# Patient Record
Sex: FEMALE | Race: WHITE | NOT HISPANIC OR LATINO | Employment: UNEMPLOYED | URBAN - METROPOLITAN AREA
[De-identification: names, ages, dates, MRNs, and addresses within clinical notes are randomized per-mention and may not be internally consistent; named-entity substitution may affect disease eponyms.]

---

## 2017-03-15 ENCOUNTER — HISTORICAL (OUTPATIENT)
Dept: LAB | Facility: HOSPITAL | Age: 57
End: 2017-03-15

## 2017-06-19 ENCOUNTER — HISTORICAL (OUTPATIENT)
Dept: LAB | Facility: HOSPITAL | Age: 57
End: 2017-06-19

## 2017-06-19 LAB
ABS NEUT (OLG): 3.28 X10(3)/MCL (ref 2.1–9.2)
ALBUMIN SERPL-MCNC: 4.6 GM/DL (ref 3.4–5)
ALBUMIN/GLOB SERPL: 1.6 {RATIO}
ALP SERPL-CCNC: 170 UNIT/L (ref 38–126)
ALT SERPL-CCNC: 82 UNIT/L (ref 12–78)
AST SERPL-CCNC: 24 UNIT/L (ref 15–37)
BASOPHILS # BLD AUTO: 0 X10(3)/MCL (ref 0–0.2)
BASOPHILS NFR BLD AUTO: 0 %
BILIRUB SERPL-MCNC: 0.4 MG/DL (ref 0.2–1)
BILIRUBIN DIRECT+TOT PNL SERPL-MCNC: 0.1 MG/DL (ref 0–0.2)
BILIRUBIN DIRECT+TOT PNL SERPL-MCNC: 0.3 MG/DL (ref 0–0.8)
BUN SERPL-MCNC: 17 MG/DL (ref 7–18)
CALCIUM SERPL-MCNC: 9.3 MG/DL (ref 8.5–10.1)
CHLORIDE SERPL-SCNC: 104 MMOL/L (ref 98–107)
CHOLEST SERPL-MCNC: 293 MG/DL (ref 0–200)
CHOLEST/HDLC SERPL: 6.2 {RATIO} (ref 0–4)
CO2 SERPL-SCNC: 29 MMOL/L (ref 21–32)
CREAT SERPL-MCNC: 1.08 MG/DL (ref 0.55–1.02)
EOSINOPHIL # BLD AUTO: 0.1 X10(3)/MCL (ref 0–0.9)
EOSINOPHIL NFR BLD AUTO: 2 %
ERYTHROCYTE [DISTWIDTH] IN BLOOD BY AUTOMATED COUNT: 12.1 % (ref 11.5–17)
EST. AVERAGE GLUCOSE BLD GHB EST-MCNC: 100 MG/DL
GLOBULIN SER-MCNC: 2.8 GM/DL (ref 2.4–3.5)
GLUCOSE SERPL-MCNC: 91 MG/DL (ref 74–106)
HBA1C MFR BLD: 5.1 % (ref 4.2–6.3)
HCT VFR BLD AUTO: 42 % (ref 37–47)
HDLC SERPL-MCNC: 47 MG/DL (ref 35–60)
HGB BLD-MCNC: 14.5 GM/DL (ref 12–16)
LDLC SERPL CALC-MCNC: ABNORMAL MG/DL (ref 0–129)
LYMPHOCYTES # BLD AUTO: 2.3 X10(3)/MCL (ref 0.6–4.6)
LYMPHOCYTES NFR BLD AUTO: 37 %
MCH RBC QN AUTO: 33.7 PG (ref 27–31)
MCHC RBC AUTO-ENTMCNC: 34.5 GM/DL (ref 33–36)
MCV RBC AUTO: 97.7 FL (ref 80–94)
MONOCYTES # BLD AUTO: 0.4 X10(3)/MCL (ref 0.1–1.3)
MONOCYTES NFR BLD AUTO: 7 %
NEUTROPHILS # BLD AUTO: 3.28 X10(3)/MCL (ref 2.1–9.2)
NEUTROPHILS NFR BLD AUTO: 54 %
PLATELET # BLD AUTO: 227 X10(3)/MCL (ref 130–400)
PMV BLD AUTO: 10.5 FL (ref 9.4–12.4)
POTASSIUM SERPL-SCNC: 4.2 MMOL/L (ref 3.5–5.1)
PROT SERPL-MCNC: 7.4 GM/DL (ref 6.4–8.2)
RBC # BLD AUTO: 4.3 X10(6)/MCL (ref 4.2–5.4)
SODIUM SERPL-SCNC: 139 MMOL/L (ref 136–145)
TRIGL SERPL-MCNC: 419 MG/DL (ref 30–150)
VLDLC SERPL CALC-MCNC: 84 MG/DL
WBC # SPEC AUTO: 6.1 X10(3)/MCL (ref 4.5–11.5)

## 2017-07-20 ENCOUNTER — HISTORICAL (OUTPATIENT)
Dept: LAB | Facility: HOSPITAL | Age: 57
End: 2017-07-20

## 2017-07-23 LAB — FINAL CULTURE: NORMAL

## 2019-02-14 ENCOUNTER — HISTORICAL (OUTPATIENT)
Dept: LAB | Facility: HOSPITAL | Age: 59
End: 2019-02-14

## 2019-02-14 LAB
ABS NEUT (OLG): 2.95 X10(3)/MCL (ref 2.1–9.2)
ALBUMIN SERPL-MCNC: 4.2 GM/DL (ref 3.4–5)
ALBUMIN/GLOB SERPL: 1.4 {RATIO}
ALP SERPL-CCNC: 97 UNIT/L (ref 45–117)
ALT SERPL-CCNC: 27 UNIT/L (ref 13–56)
APPEARANCE, UA: CLEAR
AST SERPL-CCNC: 20 UNIT/L (ref 15–37)
BASOPHILS # BLD AUTO: 0.02 X10(3)/MCL (ref 0–0.2)
BASOPHILS NFR BLD AUTO: 0.4 % (ref 0–1)
BILIRUB SERPL-MCNC: 0.4 MG/DL (ref 0.2–1)
BILIRUB UR QL STRIP: NEGATIVE
BILIRUBIN DIRECT+TOT PNL SERPL-MCNC: <0.1 MG/DL (ref 0–0.2)
BILIRUBIN DIRECT+TOT PNL SERPL-MCNC: >0.3 MG/DL (ref 0–1)
BUN SERPL-MCNC: 16 MG/DL (ref 7–18)
CALCIUM SERPL-MCNC: 8.9 MG/DL (ref 8.5–10.1)
CHLORIDE SERPL-SCNC: 107 MMOL/L (ref 98–107)
CHOLEST SERPL-MCNC: 256 MG/DL (ref 0–199)
CHOLEST/HDLC SERPL: 6 MG/DL (ref 0–8)
CO2 SERPL-SCNC: 28 MMOL/L (ref 21–32)
COLOR UR: NORMAL
CREAT SERPL-MCNC: 0.99 MG/DL (ref 0.55–1.02)
EOSINOPHIL # BLD AUTO: 0.05 X10(3)/MCL (ref 0–0.9)
EOSINOPHIL NFR BLD AUTO: 0.9 % (ref 0–6.4)
ERYTHROCYTE [DISTWIDTH] IN BLOOD BY AUTOMATED COUNT: 11.5 % (ref 11.5–17)
GLOBULIN SER-MCNC: 3 GM/DL (ref 2–4)
GLUCOSE (UA): NEGATIVE
GLUCOSE SERPL-MCNC: 81 MG/DL (ref 74–106)
HCT VFR BLD AUTO: 39.5 % (ref 37–47)
HDLC SERPL-MCNC: 43 MG/DL
HGB BLD-MCNC: 14.1 GM/DL (ref 12–16)
HGB UR QL STRIP: NEGATIVE
IMM GRANULOCYTES # BLD AUTO: 0.01 10*3/UL (ref 0–0.02)
IMM GRANULOCYTES NFR BLD AUTO: 0.2 % (ref 0–0.43)
KETONES UR QL STRIP: NEGATIVE
LDLC SERPL CALC-MCNC: 112 MG/DL (ref 0–129)
LEUKOCYTE ESTERASE UR QL STRIP: NEGATIVE
LYMPHOCYTES # BLD AUTO: 1.95 X10(3)/MCL (ref 0.6–4.6)
LYMPHOCYTES NFR BLD AUTO: 36 % (ref 16–44)
MCH RBC QN AUTO: 34.7 PG (ref 27–31)
MCHC RBC AUTO-ENTMCNC: 35.7 GM/DL (ref 33–36)
MCV RBC AUTO: 97.3 FL (ref 80–94)
MONOCYTES # BLD AUTO: 0.44 X10(3)/MCL (ref 0.1–1.3)
MONOCYTES NFR BLD AUTO: 8.1 % (ref 4–12.1)
NEUTROPHILS # BLD AUTO: 2.95 X10(3)/MCL (ref 2.1–9.2)
NEUTROPHILS NFR BLD AUTO: 54.4 % (ref 43–73)
NITRITE UR QL STRIP: NEGATIVE
NRBC BLD AUTO-RTO: 0 % (ref 0–0.2)
PH UR STRIP: 6 [PH] (ref 5–7)
PLATELET # BLD AUTO: 201 X10(3)/MCL (ref 130–400)
PMV BLD AUTO: 9.9 FL (ref 7.4–10.4)
POTASSIUM SERPL-SCNC: 4.3 MMOL/L (ref 3.5–5.1)
PROT SERPL-MCNC: 7.6 GM/DL (ref 6.4–8.2)
PROT UR QL STRIP: NEGATIVE
RBC # BLD AUTO: 4.06 X10(6)/MCL (ref 4.2–5.4)
SODIUM SERPL-SCNC: 141 MMOL/L (ref 136–145)
SP GR UR STRIP: 1.01 (ref 1–1.03)
TRIGL SERPL-MCNC: 503 MG/DL (ref 0–149)
TSH SERPL-ACNC: 1.35 MIU/ML (ref 0.36–3.74)
UROBILINOGEN UR STRIP-ACNC: NEGATIVE
VLDLC SERPL CALC-MCNC: 101 MG/DL
WBC # SPEC AUTO: 5.4 X10(3)/MCL (ref 4.5–11.5)

## 2019-06-17 ENCOUNTER — HISTORICAL (OUTPATIENT)
Dept: URGENT CARE | Facility: CLINIC | Age: 59
End: 2019-06-17

## 2021-12-08 ENCOUNTER — HISTORICAL (OUTPATIENT)
Dept: ADMINISTRATIVE | Facility: HOSPITAL | Age: 61
End: 2021-12-08

## 2021-12-08 LAB
ABS NEUT (OLG): 2.23 X10(3)/MCL (ref 2.1–9.2)
ALBUMIN SERPL-MCNC: 4.4 GM/DL (ref 3.4–4.8)
ALBUMIN/GLOB SERPL: 1.2 RATIO (ref 1.1–2)
ALP SERPL-CCNC: 153 UNIT/L (ref 40–150)
ALT SERPL-CCNC: 46 UNIT/L (ref 0–55)
APPEARANCE, UA: CLEAR
AST SERPL-CCNC: 22 UNIT/L (ref 5–34)
BACTERIA #/AREA URNS AUTO: ABNORMAL /HPF
BASOPHILS # BLD AUTO: 0 X10(3)/MCL (ref 0–0.2)
BASOPHILS NFR BLD AUTO: 0 %
BILIRUB SERPL-MCNC: 0.6 MG/DL
BILIRUB UR QL STRIP: NEGATIVE
BILIRUBIN DIRECT+TOT PNL SERPL-MCNC: 0.2 MG/DL (ref 0–0.5)
BILIRUBIN DIRECT+TOT PNL SERPL-MCNC: 0.4 MG/DL (ref 0–0.8)
BUN SERPL-MCNC: 20.6 MG/DL (ref 9.8–20.1)
CALCIUM SERPL-MCNC: 10.7 MG/DL (ref 8.7–10.5)
CHLORIDE SERPL-SCNC: 102 MMOL/L (ref 98–107)
CHOLEST SERPL-MCNC: 320 MG/DL
CHOLEST/HDLC SERPL: 6 {RATIO} (ref 0–5)
CO2 SERPL-SCNC: 29 MMOL/L (ref 23–31)
COLOR UR: YELLOW
CREAT SERPL-MCNC: 1.04 MG/DL (ref 0.55–1.02)
EOSINOPHIL # BLD AUTO: 0.1 X10(3)/MCL (ref 0–0.9)
EOSINOPHIL NFR BLD AUTO: 3 %
ERYTHROCYTE [DISTWIDTH] IN BLOOD BY AUTOMATED COUNT: 12.7 % (ref 11.5–14.5)
GLOBULIN SER-MCNC: 3.6 GM/DL (ref 2.4–3.5)
GLUCOSE (UA): NEGATIVE
GLUCOSE SERPL-MCNC: 80 MG/DL (ref 82–115)
HCT VFR BLD AUTO: 38.4 % (ref 35–46)
HDLC SERPL-MCNC: 55 MG/DL (ref 35–60)
HGB BLD-MCNC: 13.6 GM/DL (ref 12–16)
HGB UR QL STRIP: NEGATIVE
HYALINE CASTS #/AREA URNS LPF: ABNORMAL /LPF
IMM GRANULOCYTES # BLD AUTO: 0.01 10*3/UL
IMM GRANULOCYTES NFR BLD AUTO: 0 %
KETONES UR QL STRIP: NEGATIVE
LDLC SERPL CALC-MCNC: 224 MG/DL (ref 50–140)
LEUKOCYTE ESTERASE UR QL STRIP: 75 LEU/UL
LYMPHOCYTES # BLD AUTO: 1.9 X10(3)/MCL (ref 0.6–4.6)
LYMPHOCYTES NFR BLD AUTO: 40 %
MCH RBC QN AUTO: 35.1 PG (ref 26–34)
MCHC RBC AUTO-ENTMCNC: 35.4 GM/DL (ref 31–37)
MCV RBC AUTO: 99 FL (ref 80–100)
MONOCYTES # BLD AUTO: 0.4 X10(3)/MCL (ref 0.1–1.3)
MONOCYTES NFR BLD AUTO: 8 %
NEUTROPHILS # BLD AUTO: 2.23 X10(3)/MCL (ref 2.1–9.2)
NEUTROPHILS NFR BLD AUTO: 48 %
NITRITE UR QL STRIP: NEGATIVE
NRBC BLD AUTO-RTO: 0 % (ref 0–0.2)
PH UR STRIP: 7 [PH] (ref 4.5–8)
PLATELET # BLD AUTO: 234 X10(3)/MCL (ref 130–400)
PMV BLD AUTO: 9.2 FL (ref 7.4–10.4)
POTASSIUM SERPL-SCNC: 4.2 MMOL/L (ref 3.5–5.1)
PROT SERPL-MCNC: 8 GM/DL (ref 5.8–7.6)
PROT UR QL STRIP: NEGATIVE
RBC # BLD AUTO: 3.88 X10(6)/MCL (ref 4–5.2)
RBC #/AREA URNS AUTO: ABNORMAL /HPF
SODIUM SERPL-SCNC: 141 MMOL/L (ref 136–145)
SP GR UR STRIP: 1.01 (ref 1–1.03)
SQUAMOUS #/AREA URNS LPF: ABNORMAL /LPF
TRIGL SERPL-MCNC: 204 MG/DL (ref 37–140)
UROBILINOGEN UR STRIP-ACNC: NORMAL
VLDLC SERPL CALC-MCNC: 41 MG/DL
WBC # SPEC AUTO: 4.6 X10(3)/MCL (ref 4.5–11)
WBC #/AREA URNS AUTO: ABNORMAL /HPF

## 2021-12-09 LAB — FINAL CULTURE: NORMAL

## 2022-02-17 ENCOUNTER — HISTORICAL (OUTPATIENT)
Dept: ADMINISTRATIVE | Facility: HOSPITAL | Age: 62
End: 2022-02-17

## 2022-03-02 ENCOUNTER — HISTORICAL (OUTPATIENT)
Dept: ADMINISTRATIVE | Facility: HOSPITAL | Age: 62
End: 2022-03-02

## 2022-03-02 LAB
ABS NEUT (OLG): 7.42 (ref 2.1–9.2)
ALBUMIN SERPL-MCNC: 3.2 G/DL (ref 3.4–4.8)
ALBUMIN/GLOB SERPL: 1.1 {RATIO} (ref 1.1–2)
ALP SERPL-CCNC: 139 U/L (ref 40–150)
ALT SERPL-CCNC: 164 U/L (ref 0–55)
APPEARANCE, UA: CLEAR
AST SERPL-CCNC: 21 U/L (ref 5–34)
BACTERIA SPEC CULT: NORMAL
BASOPHILS # BLD AUTO: 0 10*3/UL (ref 0–0.2)
BASOPHILS NFR BLD AUTO: 0 %
BILIRUB SERPL-MCNC: 0.6 MG/DL
BILIRUB UR QL STRIP: NEGATIVE
BILIRUBIN DIRECT+TOT PNL SERPL-MCNC: 0.2 (ref 0–0.5)
BILIRUBIN DIRECT+TOT PNL SERPL-MCNC: 0.4 (ref 0–0.8)
BUN SERPL-MCNC: 32 MG/DL (ref 9.8–20.1)
CALCIUM SERPL-MCNC: 9.5 MG/DL (ref 8.7–10.5)
CHLORIDE SERPL-SCNC: 99 MMOL/L (ref 98–107)
CHOLEST SERPL-MCNC: 217 MG/DL
CHOLEST/HDLC SERPL: 4 {RATIO} (ref 0–5)
CO2 SERPL-SCNC: 29 MMOL/L (ref 23–31)
COLOR UR: NORMAL
CREAT SERPL-MCNC: 0.8 MG/DL (ref 0.55–1.02)
EOSINOPHIL # BLD AUTO: 0.1 10*3/UL (ref 0–0.9)
EOSINOPHIL NFR BLD AUTO: 0 %
ERYTHROCYTE [DISTWIDTH] IN BLOOD BY AUTOMATED COUNT: 15.4 % (ref 11.5–14.5)
FLAG2 (OHS): 80
FLAG3 (OHS): 80
FLAGS (OHS): 70
GLOBULIN SER-MCNC: 2.9 G/DL (ref 2.4–3.5)
GLUCOSE (UA): NORMAL
GLUCOSE SERPL-MCNC: 84 MG/DL (ref 82–115)
HCT VFR BLD AUTO: 33.6 % (ref 35–46)
HDLC SERPL-MCNC: 62 MG/DL (ref 35–60)
HEMOLYSIS INTERF INDEX SERPL-ACNC: 7
HGB BLD-MCNC: 11.1 G/DL (ref 12–16)
HGB UR QL STRIP: NEGATIVE
HYALINE CASTS #/AREA URNS LPF: NORMAL /[LPF]
ICTERIC INTERF INDEX SERPL-ACNC: 1
IMM GRANULOCYTES # BLD AUTO: 0.2 10*3/UL
IMM GRANULOCYTES NFR BLD AUTO: 2 %
IMM. NE 1 SUSPECT FLAG (OHS): 20
IMM. NE 2 SUSPECT FLAG (OHS): 10
KETONES UR QL STRIP: NEGATIVE
LDLC SERPL CALC-MCNC: 120 MG/DL (ref 50–140)
LEUKOCYTE ESTERASE UR QL STRIP: NEGATIVE
LIPEMIC INTERF INDEX SERPL-ACNC: 5
LOW EVENT # SUSPECT FLAG (OHS): 70
LYMPHOCYTES # BLD AUTO: 2.5 10*3/UL (ref 0.6–4.6)
LYMPHOCYTES NFR BLD AUTO: 22 %
MANUAL DIFF? (OHS): NO
MCH RBC QN AUTO: 32.5 PG (ref 26–34)
MCHC RBC AUTO-ENTMCNC: 33 G/DL (ref 31–37)
MCV RBC AUTO: 98.2 FL (ref 80–100)
MO BLASTS SUSPECT FLAG (OHS): 40
MONOCYTES # BLD AUTO: 0.9 10*3/UL (ref 0.1–1.3)
MONOCYTES NFR BLD AUTO: 8 %
MUCOUS THREADS URNS QL MICRO: NORMAL
NEUTROPHILS # BLD AUTO: 7.42 10*3/UL (ref 2.1–9.2)
NEUTROPHILS NFR BLD AUTO: 67 %
NITRITE UR QL STRIP: NEGATIVE
NRBC BLD AUTO-RTO: 0 % (ref 0–0.2)
PH UR STRIP: 7 [PH] (ref 4.5–8)
PLATELET # BLD AUTO: 217 10*3/UL (ref 130–400)
PLATELET CLUMPS SUSPECT FLAG (OHS): 30
PMV BLD AUTO: 10.2 FL (ref 7.4–10.4)
POTASSIUM SERPL-SCNC: 4.3 MMOL/L (ref 3.5–5.1)
PROT SERPL-MCNC: 6.1 G/DL (ref 5.8–7.6)
PROT UR QL STRIP: NEGATIVE
RBC # BLD AUTO: 3.42 10*6/UL (ref 4–5.2)
RBC #/AREA URNS HPF: NORMAL /[HPF] (ref 0–5)
SODIUM SERPL-SCNC: 134 MMOL/L (ref 136–145)
SP GR UR STRIP: 1.02 (ref 1–1.03)
SQUAMOUS EPITHELIAL, UA: NORMAL
TRIGL SERPL-MCNC: 177 MG/DL (ref 37–140)
UROBILINOGEN UR STRIP-ACNC: NORMAL
VLDLC SERPL CALC-MCNC: 35 MG/DL
WBC # SPEC AUTO: 11 10*3/UL (ref 4.5–11)
WBC #/AREA URNS HPF: NORMAL /[HPF] (ref 0–5)

## 2022-03-28 ENCOUNTER — HISTORICAL (OUTPATIENT)
Dept: ADMINISTRATIVE | Facility: HOSPITAL | Age: 62
End: 2022-03-28

## 2022-05-03 NOTE — HISTORICAL [facility]
This is a historical note converted from Paula. Formatting and pictures may have been removed.  Please reference Paula for original formatting and attached multimedia. Chief Complaint  Needs pcp  History of Present Illness  61-year-old white female presents establish care in the [facility]. ?Patient?has a past medical history of?PTSD, generalized anxiety,?hypertension, mixed hyperlipidemia,?AML, neuropathy. ?She was previously seeing Dr. Karely Lackey (family medicine)?but has not been back in over 2 years. ?She?was also well-established with a cardiologist?but missed her last few appointments and tells me that they have been trying to contact her recently?for follow-up visit; will call them back soon.  Denies any complaints today, needs refills on her medications.  Review of Systems  ????Constitutional: No fever, No chills, No weakness, No fatigue.  ?????Eye: No recent visual problem.  ?????Respiratory: No shortness of breath, No cough, No sputum production, No wheezing.  ?????Cardiovascular: No chest pain, No palpitations, No peripheral edema.  ?????Gastrointestinal: No nausea, No vomiting, No diarrhea, No constipation, No heartburn, No abdominal pain.  ?????Genitourinary: No dysuria.  ?????Endocrine: No excessive thirst, No polyuria, No cold intolerance, No heat intolerance.  ?????Musculoskeletal: No back pain, No joint pain, No decreased range of motion.  ?????Integumentary: No rash, No pruritus.  ?????Neurologic: Alert and oriented X4, No numbness, No tingling, No headache.  ?????Psychiatric: No anxiety, No depression.  Physical Exam  Vitals & Measurements  T:?37.0? ?C (Oral)? HR:?50(Peripheral)? RR:?18? BP:?144/68?  HT:?160.00?cm? WT:?64.400?kg? BMI:?25.16?  General: Alert and oriented, No acute distress.  ?????Eye: Pupils are equal, round and reactive to light, Extraocular movements are intact, Normal conjunctiva.  ?????HENT: Normocephalic, Oral mucosa is moist, No pharyngeal  erythema.  ?????Neck: Supple, Non-tender.  ?????Respiratory: Lungs are clear to auscultation, Respirations are non-labored.  ?????Cardiovascular: Normal rate, Regular rhythm, No murmur, Good pulses equal in all extremities, No edema.  ?????Gastrointestinal: Soft, Non-tender, Non-distended, Normal bowel sounds.  ?????Musculoskeletal: Normal range of motion, Normal strength.  ?????Integumentary: Warm, Dry.  ?????Neurologic: Alert, Oriented.  ?????Cognition and Speech: Oriented, Speech clear and coherent.  ?????Psychiatric: Cooperative, Appropriate mood & affect.  Assessment/Plan  Cervical cancer screening?Z12.4  ?  History of acute myeloid leukemia?Z85.6  Ordered:  CBC w/ Auto Diff, Routine collect, *Est. 12/08/21 3:00:00 CST, Blood, Order for future visit, *Est. Stop date 12/08/21 3:00:00 CST, Lab Collect, Wellness examination  Screening cholesterol level  Hypertension  History of acute myeloid leukemia  Neuropathy  PTSD (p...  CBC w/ Auto Diff, Routine collect, *Est. 03/08/22 3:00:00 CST, Blood, Order for future visit, *Est. Stop date 03/08/22 3:00:00 CST, Lab Collect, Wellness examination  Screening cholesterol level  Hypertension  History of acute myeloid leukemia  Neuropathy, 12/08/21...  Clinic Follow up, *Est. 03/15/22 3:00:00 CDT, Order for future visit, Wellness examination  Screening cholesterol level  Hypertension  History of acute myeloid leukemia  Neuropathy  Clinic Follow up, *Est. 03/08/22 3:00:00 CST, FASTING labs, Order for future visit, Wellness examination  Screening cholesterol level  Hypertension  History of acute myeloid leukemia  Neuropathy  Comprehensive Metabolic Panel, Routine collect, *Est. 03/08/22 3:00:00 CST, Blood, Order for future visit, *Est. Stop date 03/08/22 3:00:00 CST, Lab Collect, Wellness examination  Screening cholesterol level  Hypertension  History of acute myeloid leukemia  Neuropathy, 12/08/21...  Comprehensive Metabolic Panel, Routine collect, *Est.  12/08/21 3:00:00 CST, Blood, Order for future visit, *Est. Stop date 12/08/21 3:00:00 CST, Lab Collect, Wellness examination  Screening cholesterol level  Hypertension  History of acute myeloid leukemia  Neuropathy  PTSD (p...  Lipid Panel, Routine collect, *Est. 03/08/22 3:00:00 CST, Blood, Order for future visit, *Est. Stop date 03/08/22 3:00:00 CST, Lab Collect, Wellness examination  Screening cholesterol level  Hypertension  History of acute myeloid leukemia  Neuropathy, 12/08/21...  Lipid Panel, Routine collect, *Est. 12/08/21 3:00:00 CST, Blood, Order for future visit, *Est. Stop date 12/08/21 3:00:00 CST, Lab Collect, Wellness examination  Screening cholesterol level  Hypertension  History of acute myeloid leukemia  Neuropathy  PTSD (p...  Urinalysis with Microscopic if Indicated, Routine collect, Urine, Order for future visit, *Est. 03/08/22 3:00:00 CST, *Est. Stop date 03/08/22 3:00:00 CST, Nurse collect, Wellness examination  Screening cholesterol level  Hypertension  History of acute myeloid leukemia  Neuropathy  Urinalysis with Microscopic if Indicated, Routine collect, Urine, Order for future visit, *Est. 12/08/21 3:00:00 CST, *Est. Stop date 12/08/21 3:00:00 CST, Nurse collect, Wellness examination  Screening cholesterol level  Hypertension  History of acute myeloid leukemia  Neuropathy  PTSD...  ?  Screen for colon cancer?Z12.11  ?  Orders:  gabapentin, 800 mg = 1 tab(s), Oral, TID, # 90 tab(s), 3 Refill(s), Pharmacy: [facility] [id number], 160, cm, Height/Length Dosing, 12/08/21 9:17:00 CST, 64.4, kg, Weight Dosing, 12/08/21 9:17:00 CST  hydrOXYzine, 25 mg = 1 cap(s), Oral, TID, X 30 day(s), # 90 cap(s), 3 Refill(s), Pharmacy: [facility] [id number], 160, cm, Height/Length Dosing, 12/08/21 9:17:00 CST, 64.4, kg, Weight Dosing, 12/08/21 9:17:00 CST  lisinopril, 10 mg = 1 tab(s), Oral, Daily, # 30 tab(s), 3 Refill(s), Pharmacy: [facility] [address] [id number], 160, cm, Height/Length Dosing,  12/08/21 9:17:00 CST, 64.4, kg, Weight Dosing, 12/08/21 9:17:00 CST  metoprolol, 12.5 mg = 0.5 tab(s), Oral, Daily, # 15 tab(s), 3 Refill(s), Pharmacy: [id number], 160, cm, Height/Length Dosing, 12/08/21 9:17:00 CST, 64.4, kg, Weight Dosing, 12/08/21 9:17:00 CST  PARoxetine, 30 mg = 1 tab(s), Oral, Daily, # 30 tab(s), 3 Refill(s), Pharmacy: [id number], 160, cm, Height/Length Dosing, 12/08/21 9:17:00 CST, 64.4, kg, Weight Dosing, 12/08/21 9:17:00 CST  ?  1.? Hypertension:? Stable on lisinopril, metoprolol.  2.? PTSD, anxiety:?Stable on Paxil and Vistaril as needed.  3.? AML:? Will defer to Oncologist.  4.? Neuropathy:? Stable on gabapentin.  5.? Wellness,?screening cholesterol:?Fasting labs today.  6. ?History of hyperlipidemia:?Fasting labs as above 1 week prior to follow-up appointment in 3 months.  ?  Screening: Mammogram: Ordered to be done within 1 month.  ???? ?????????? Pap smear: Referral to gynecology.  ??????????????? Colon: Referral to GI lab.  ?  Patient voiced understanding.  Referrals  [facility] Internal Referral to GI Procedure Lab, Specialty: Gastroenterology, Reason: Screening colonoscopy, Type: Procedure, Refer To: Husam MEYER, Yaneth KOHLI, [facility] GI Procedure [facility] , [address] [place] [id number]., Start: 12/08/21 9:36:00 CST, Urgent:  [facility] Internal Referral to Gynecology Clinic, Specialty: Gynecology, Reason: Pap smear, Refer To: Munir TAMAYO, Elayne KOHLI, [facility] , [address] [place] [address]., Start: 12/08/21 9:37:00 CST, Urgent:  Clinic Follow up, *Est. 03/08/22 3:00:00 CST, FASTING labs, Order for future visit, Wellness examination  Screening cholesterol level  Hypertension  History of acute myeloid leukemia  Neuropathy  Clinic Follow up, *Est. 03/15/22 3:00:00 CDT, Order for future visit, Wellness examination  Screening cholesterol level  Hypertension  History of acute myeloid leukemia  Neuropathy   Problem List/Past Medical  History  Ongoing  Anxiety  Back ache  COPD (chronic obstructive pulmonary disease)  Enlarged heart  Generalized pain  GERD (gastroesophageal reflux disease)  HTN - Hypertension  Hypercholesteremia  Hypertension  Leukemia  Neuropathy  PTSD (post-traumatic stress disorder)  Rash  Historical  AML (acute myeloblastic leukemia)  Chemotherapy  dislocated disc  GERD (gastroesophageal reflux disease)  HTN (hypertension)  Neuropathy  Osteoporosis  Sinusitis  Procedure/Surgical History  Drainage of Scalp Skin, External Approach (10/23/2021)  Incision and drainage of abscess (eg, carbuncle, suppurative hidradenitis, cutaneous or subcutaneous abscess, cyst, furuncle, or paronychia); complicated or multiple (10/23/2021)  Fluoroscopy of Bilateral Renal Arteries using Low Osmolar Contrast (10/13/2016)  Fluoroscopy of Left Heart using Low Osmolar Contrast (10/13/2016)  Fluoroscopy of Multiple Coronary Arteries using Low Osmolar Contrast (10/13/2016)  Fluoroscopy of Thoracic Aorta using Low Osmolar Contrast (10/13/2016)  Measurement of Cardiac Sampling and Pressure, Left Heart, Percutaneous Approach (10/13/2016)  Insertion of Infusion Device into Right Brachial Vein, Percutaneous Approach (10/12/2016)  Drainage of Right Inguinal Lymphatic, Percutaneous Approach, Diagnostic (10/11/2016)  Insertion of Endotracheal Airway into Trachea, Via Natural or Artificial Opening (10/11/2016)  Respiratory Ventilation, Less than 24 Consecutive Hours (10/11/2016)  Cholecystectomy Laparoscopic (None) (09/26/2016)  Excision of Abdomen Skin, External Approach, Diagnostic (09/26/2016)  Plain Radiography of Gallbladder and Bile Ducts using Low Osmolar Contrast (09/26/2016)  Resection of Gallbladder, Percutaneous Endoscopic Approach (09/26/2016)  port (2010)   Medications  gabapentin 800 mg oral tablet, 800 mg= 1 tab(s), Oral, TID, 3 refills  hydrOXYzine pamoate 25 mg oral capsule, 25 mg= 1 cap(s), Oral, TID, 3 refills  lisinopril 10 mg oral tablet, 10  mg= 1 tab(s), Oral, Daily, 3 refills  Metoprolol Succinate ER 25 mg oral tablet extended release, 12.5 mg= 0.5 tab(s), Oral, Daily, 3 refills  Paxil 30 mg oral tablet, 30 mg= 1 tab(s), Oral, Daily, 3 refills  Allergies  lovastatin?(dermatitis)  penicillins?(BREATHING PROBLEMS)  Sulfur?(RASH)  Social History  Abuse/Neglect  No, No, Yes, 11/11/2021  Alcohol - Denies Alcohol Use, 06/24/2014  Past, 12/08/2021  Employment/School  Unemployed, 12/08/2021  Exercise - Does not exercise, 03/05/2015  Exercise duration: 0., 12/08/2021  Home/Environment  Lives with Alone. Living situation: Home/Independent. Walker/Cane, 12/08/2021  Nutrition/Health  Regular, Good, 12/08/2021  Other  Substance Use - Denies Substance Abuse, 06/24/2014  Never, 12/08/2021  Tobacco - Denies Tobacco Use, 06/24/2014  Former smoker, quit more than 30 days ago, Cigarettes, N/A, 12/08/2021  Family History  Acute myocardial infarction.: Father.  Diabetes: Sister.  Diabetes mellitus type 1.: Mother, Mother, Sister, Brother and Other.  Heart attack: Father.  Hypertension.: Mother.  Primary malignant neoplasm of bone: Sister.  Immunizations  Vaccine Date Status Comments   influenza virus vaccine, inactivated - Not Given Patient Refuses     influenza virus vaccine, inactivated 10/13/2015 Recorded    Health Maintenance  Health Maintenance  ???Pending?(in the next year)  ??? ??OverDue  ??? ? ? ?Breast Cancer Screening due??04/01/17??and every 2??year(s)  ??? ? ? ?Colorectal Screening due??04/28/17??and every 1??year(s)  ??? ? ? ?Cervical Cancer Screening due??08/23/18??and every 3??year(s)  ??? ? ? ?Blood Pressure Screening due??06/16/20??and every 1??year(s)  ??? ? ? ?Body Mass Index Check due??06/16/20??and every 1??year(s)  ??? ? ? ?Hypertension Management-Blood Pressure due??06/16/20??and every 1??year(s)  ??? ? ? ?Alcohol Misuse Screening due??01/02/21??and every 1??year(s)  ??? ??Due?  ??? ? ? ?Aspirin Therapy for CVD Prevention due??12/08/21??and every  1??year(s)  ??? ? ? ?COPD Maintenance-Spirometry due??12/08/21??Unknown Frequency  ??? ? ? ?Hypertension Management-Education due??12/08/21??and every 1??year(s)  ??? ? ? ?Medicare Annual Wellness Exam due??12/08/21??and every 1??year(s)  ??? ? ? ?Tetanus Vaccine due??12/08/21??and every 10??year(s)  ??? ? ? ?Zoster Vaccine due??12/08/21??Unknown Frequency  ??? ??Due In Future?  ??? ? ? ?Obesity Screening not due until??01/01/22??and every 1??year(s)  ??? ? ? ?Hypertension Management-BMP not due until??11/20/22??and every 1??year(s)  ???Satisfied?(in the past 1 year)  ??? ??Satisfied?  ??? ? ? ?ADL Screening on??12/08/21.??Satisfied by Nasreen Ramires LPN  ??? ? ? ?Blood Pressure Screening on??11/20/21.??Satisfied by Jeanne Du RN  ??? ? ? ?Body Mass Index Check on??11/11/21.??Satisfied by Armida Wagner RN  ??? ? ? ?Depression Screening on??12/08/21.??Satisfied by Nasreen Ramires LPN  ??? ? ? ?Diabetes Screening on??11/20/21.??Satisfied by Robin Alvarado  ??? ? ? ?Hypertension Management-BMP on??11/20/21.??Satisfied by Robin Alvarado  ??? ? ? ?Obesity Screening on??11/11/21.??Satisfied by Armida Wagner RN  ?

## 2022-06-23 ENCOUNTER — TELEPHONE (OUTPATIENT)
Dept: GYNECOLOGY | Facility: CLINIC | Age: 62
End: 2022-06-23
Payer: MEDICAID

## 2022-06-24 RX ORDER — HYDROXYZINE PAMOATE 25 MG/1
25 CAPSULE ORAL 3 TIMES DAILY
Qty: 90 CAPSULE | Refills: 1 | Status: SHIPPED | OUTPATIENT
Start: 2022-06-24 | End: 2022-08-19 | Stop reason: SDUPTHER

## 2022-07-20 ENCOUNTER — TELEPHONE (OUTPATIENT)
Dept: FAMILY MEDICINE | Facility: CLINIC | Age: 62
End: 2022-07-20
Payer: MEDICAID

## 2022-07-20 NOTE — TELEPHONE ENCOUNTER
----- Message from Karely Terry sent at 7/19/2022  9:47 AM CDT -----  Regarding: Prescription Refill  Patient called and would like a refill on all her medicine.  She was previously a Dr. NEHEMIAS Osorio' patient.  Appointment scheduled 9/26/22.  Please advise and Thank You..

## 2022-07-21 RX ORDER — EZETIMIBE 10 MG/1
10 TABLET ORAL NIGHTLY
Qty: 90 TABLET | Refills: 1 | Status: SHIPPED | OUTPATIENT
Start: 2022-07-21 | End: 2022-11-07 | Stop reason: SDUPTHER

## 2022-07-21 RX ORDER — PANTOPRAZOLE SODIUM 40 MG/1
40 TABLET, DELAYED RELEASE ORAL DAILY
Qty: 90 TABLET | Refills: 1 | Status: SHIPPED | OUTPATIENT
Start: 2022-07-21 | End: 2023-02-17 | Stop reason: SDUPTHER

## 2022-07-21 RX ORDER — PAROXETINE 30 MG/1
30 TABLET, FILM COATED ORAL DAILY
Qty: 90 TABLET | Refills: 1 | Status: SHIPPED | OUTPATIENT
Start: 2022-07-21 | End: 2022-11-07 | Stop reason: SDUPTHER

## 2022-07-21 RX ORDER — LISINOPRIL 10 MG/1
10 TABLET ORAL DAILY
Qty: 90 TABLET | Refills: 1 | Status: SHIPPED | OUTPATIENT
Start: 2022-07-21 | End: 2022-11-07 | Stop reason: SDUPTHER

## 2022-07-21 RX ORDER — GABAPENTIN 800 MG/1
800 TABLET ORAL 3 TIMES DAILY
Qty: 90 TABLET | Refills: 5 | Status: SHIPPED | OUTPATIENT
Start: 2022-07-21 | End: 2023-07-11 | Stop reason: SDUPTHER

## 2022-07-21 NOTE — TELEPHONE ENCOUNTER
----- Message from Nasreen Ramires LPN sent at 7/20/2022  2:36 PM CDT -----  Regarding: FW: Prescription Refill    ----- Message -----  From: Karely Stewart  Sent: 7/19/2022   9:50 AM CDT  To: [facility] Clinical Support Staff  Subject: Prescription Refill                              Patient called and would like a refill on all her medicine.  She was previously a Dr. NEHEMIAS Osorio' patient.  Appointment scheduled 9/26/22.  Please advise and Thank You..

## 2022-08-19 RX ORDER — HYDROXYZINE PAMOATE 25 MG/1
25 CAPSULE ORAL 3 TIMES DAILY
Qty: 90 CAPSULE | Refills: 1 | Status: SHIPPED | OUTPATIENT
Start: 2022-08-19 | End: 2022-11-07 | Stop reason: SDUPTHER

## 2022-11-07 ENCOUNTER — OFFICE VISIT (OUTPATIENT)
Dept: FAMILY MEDICINE | Facility: CLINIC | Age: 62
End: 2022-11-07
Payer: MEDICARE

## 2022-11-07 ENCOUNTER — LAB VISIT (OUTPATIENT)
Dept: LAB | Facility: HOSPITAL | Age: 62
End: 2022-11-07
Attending: FAMILY MEDICINE
Payer: MEDICARE

## 2022-11-07 VITALS
SYSTOLIC BLOOD PRESSURE: 132 MMHG | WEIGHT: 152.56 LBS | RESPIRATION RATE: 18 BRPM | HEART RATE: 74 BPM | TEMPERATURE: 98 F | BODY MASS INDEX: 27.03 KG/M2 | DIASTOLIC BLOOD PRESSURE: 69 MMHG | HEIGHT: 63 IN

## 2022-11-07 DIAGNOSIS — C92.01 ACUTE MYELOID LEUKEMIA IN REMISSION: ICD-10-CM

## 2022-11-07 DIAGNOSIS — J44.9 CHRONIC OBSTRUCTIVE PULMONARY DISEASE, UNSPECIFIED COPD TYPE: ICD-10-CM

## 2022-11-07 DIAGNOSIS — G62.9 NEUROPATHY: ICD-10-CM

## 2022-11-07 DIAGNOSIS — C92.01 ACUTE MYELOID LEUKEMIA IN REMISSION: Primary | ICD-10-CM

## 2022-11-07 DIAGNOSIS — E78.5 HYPERLIPIDEMIA, UNSPECIFIED HYPERLIPIDEMIA TYPE: ICD-10-CM

## 2022-11-07 DIAGNOSIS — I10 HYPERTENSION, UNSPECIFIED TYPE: ICD-10-CM

## 2022-11-07 DIAGNOSIS — Z23 IMMUNIZATION DUE: ICD-10-CM

## 2022-11-07 LAB
ALBUMIN SERPL-MCNC: 4.2 GM/DL (ref 3.4–4.8)
ALBUMIN/GLOB SERPL: 1.4 RATIO (ref 1.1–2)
ALP SERPL-CCNC: 98 UNIT/L (ref 40–150)
ALT SERPL-CCNC: 17 UNIT/L (ref 0–55)
AST SERPL-CCNC: 16 UNIT/L (ref 5–34)
BASOPHILS # BLD AUTO: 0.03 X10(3)/MCL (ref 0–0.2)
BASOPHILS NFR BLD AUTO: 0.5 %
BILIRUBIN DIRECT+TOT PNL SERPL-MCNC: 0.5 MG/DL
BUN SERPL-MCNC: 19.7 MG/DL (ref 9.8–20.1)
CALCIUM SERPL-MCNC: 9.8 MG/DL (ref 8.4–10.2)
CHLORIDE SERPL-SCNC: 105 MMOL/L (ref 98–107)
CO2 SERPL-SCNC: 27 MMOL/L (ref 23–31)
CREAT SERPL-MCNC: 1 MG/DL (ref 0.55–1.02)
EOSINOPHIL # BLD AUTO: 0.09 X10(3)/MCL (ref 0–0.9)
EOSINOPHIL NFR BLD AUTO: 1.4 %
ERYTHROCYTE [DISTWIDTH] IN BLOOD BY AUTOMATED COUNT: 12.6 % (ref 11.5–17)
GFR SERPLBLD CREATININE-BSD FMLA CKD-EPI: >60 MLS/MIN/1.73/M2
GLOBULIN SER-MCNC: 3 GM/DL (ref 2.4–3.5)
GLUCOSE SERPL-MCNC: 79 MG/DL (ref 82–115)
HCT VFR BLD AUTO: 36.3 % (ref 37–47)
HGB BLD-MCNC: 12.7 GM/DL (ref 12–16)
IMM GRANULOCYTES # BLD AUTO: 0.01 X10(3)/MCL (ref 0–0.04)
IMM GRANULOCYTES NFR BLD AUTO: 0.2 %
LYMPHOCYTES # BLD AUTO: 2.33 X10(3)/MCL (ref 0.6–4.6)
LYMPHOCYTES NFR BLD AUTO: 36.7 %
MCH RBC QN AUTO: 32.8 PG (ref 27–31)
MCHC RBC AUTO-ENTMCNC: 35 MG/DL (ref 33–36)
MCV RBC AUTO: 93.8 FL (ref 80–94)
MONOCYTES # BLD AUTO: 0.52 X10(3)/MCL (ref 0.1–1.3)
MONOCYTES NFR BLD AUTO: 8.2 %
NEUTROPHILS # BLD AUTO: 3.4 X10(3)/MCL (ref 2.1–9.2)
NEUTROPHILS NFR BLD AUTO: 53 %
NRBC BLD AUTO-RTO: 0 %
PLATELET # BLD AUTO: 242 X10(3)/MCL (ref 130–400)
PMV BLD AUTO: 9.9 FL (ref 7.4–10.4)
POTASSIUM SERPL-SCNC: 4.2 MMOL/L (ref 3.5–5.1)
PROT SERPL-MCNC: 7.2 GM/DL (ref 5.8–7.6)
RBC # BLD AUTO: 3.87 X10(6)/MCL (ref 4.2–5.4)
SODIUM SERPL-SCNC: 140 MMOL/L (ref 136–145)
T4 FREE SERPL-MCNC: 0.82 NG/DL (ref 0.7–1.48)
TSH SERPL-ACNC: 1.17 UIU/ML (ref 0.35–4.94)
WBC # SPEC AUTO: 6.4 X10(3)/MCL (ref 4.5–11.5)

## 2022-11-07 PROCEDURE — 84439 ASSAY OF FREE THYROXINE: CPT

## 2022-11-07 PROCEDURE — 3078F DIAST BP <80 MM HG: CPT | Mod: CPTII,,, | Performed by: FAMILY MEDICINE

## 2022-11-07 PROCEDURE — G0008 ADMIN INFLUENZA VIRUS VAC: HCPCS | Mod: PBBFAC

## 2022-11-07 PROCEDURE — 3008F BODY MASS INDEX DOCD: CPT | Mod: CPTII,,, | Performed by: FAMILY MEDICINE

## 2022-11-07 PROCEDURE — 36415 COLL VENOUS BLD VENIPUNCTURE: CPT

## 2022-11-07 PROCEDURE — 4010F PR ACE/ARB THEARPY RXD/TAKEN: ICD-10-PCS | Mod: CPTII,,, | Performed by: FAMILY MEDICINE

## 2022-11-07 PROCEDURE — 3008F PR BODY MASS INDEX (BMI) DOCUMENTED: ICD-10-PCS | Mod: CPTII,,, | Performed by: FAMILY MEDICINE

## 2022-11-07 PROCEDURE — 80053 COMPREHEN METABOLIC PANEL: CPT

## 2022-11-07 PROCEDURE — 4010F ACE/ARB THERAPY RXD/TAKEN: CPT | Mod: CPTII,,, | Performed by: FAMILY MEDICINE

## 2022-11-07 PROCEDURE — 85025 COMPLETE CBC W/AUTO DIFF WBC: CPT

## 2022-11-07 PROCEDURE — 99214 OFFICE O/P EST MOD 30 MIN: CPT | Mod: PBBFAC | Performed by: FAMILY MEDICINE

## 2022-11-07 PROCEDURE — 84443 ASSAY THYROID STIM HORMONE: CPT

## 2022-11-07 PROCEDURE — 3075F SYST BP GE 130 - 139MM HG: CPT | Mod: CPTII,,, | Performed by: FAMILY MEDICINE

## 2022-11-07 PROCEDURE — 1159F PR MEDICATION LIST DOCUMENTED IN MEDICAL RECORD: ICD-10-PCS | Mod: CPTII,,, | Performed by: FAMILY MEDICINE

## 2022-11-07 PROCEDURE — G0009 ADMIN PNEUMOCOCCAL VACCINE: HCPCS | Mod: PBBFAC

## 2022-11-07 PROCEDURE — 1159F MED LIST DOCD IN RCRD: CPT | Mod: CPTII,,, | Performed by: FAMILY MEDICINE

## 2022-11-07 PROCEDURE — 90677 PCV20 VACCINE IM: CPT | Mod: PBBFAC

## 2022-11-07 PROCEDURE — 3075F PR MOST RECENT SYSTOLIC BLOOD PRESS GE 130-139MM HG: ICD-10-PCS | Mod: CPTII,,, | Performed by: FAMILY MEDICINE

## 2022-11-07 PROCEDURE — 3078F PR MOST RECENT DIASTOLIC BLOOD PRESSURE < 80 MM HG: ICD-10-PCS | Mod: CPTII,,, | Performed by: FAMILY MEDICINE

## 2022-11-07 PROCEDURE — 99204 PR OFFICE/OUTPT VISIT, NEW, LEVL IV, 45-59 MIN: ICD-10-PCS | Mod: S$PBB,,, | Performed by: FAMILY MEDICINE

## 2022-11-07 PROCEDURE — 99204 OFFICE O/P NEW MOD 45 MIN: CPT | Mod: S$PBB,,, | Performed by: FAMILY MEDICINE

## 2022-11-07 RX ORDER — GABAPENTIN 800 MG/1
800 TABLET ORAL 3 TIMES DAILY
Qty: 90 TABLET | Refills: 5 | Status: SHIPPED | OUTPATIENT
Start: 2022-11-07 | End: 2023-07-12

## 2022-11-07 RX ORDER — LISINOPRIL 10 MG/1
10 TABLET ORAL DAILY
Qty: 90 TABLET | Refills: 1 | Status: SHIPPED | OUTPATIENT
Start: 2022-11-07 | End: 2023-05-10 | Stop reason: SDUPTHER

## 2022-11-07 RX ORDER — PAROXETINE 30 MG/1
30 TABLET, FILM COATED ORAL DAILY
Qty: 90 TABLET | Refills: 1 | Status: SHIPPED | OUTPATIENT
Start: 2022-11-07 | End: 2023-05-10 | Stop reason: SDUPTHER

## 2022-11-07 RX ORDER — EZETIMIBE 10 MG/1
10 TABLET ORAL NIGHTLY
Qty: 90 TABLET | Refills: 1 | Status: SHIPPED | OUTPATIENT
Start: 2022-11-07 | End: 2023-08-10 | Stop reason: SDUPTHER

## 2022-11-07 RX ORDER — GABAPENTIN 100 MG/1
100 CAPSULE ORAL 3 TIMES DAILY
Qty: 90 CAPSULE | Refills: 5 | Status: SHIPPED | OUTPATIENT
Start: 2022-11-07 | End: 2023-06-07 | Stop reason: SDUPTHER

## 2022-11-07 RX ORDER — HYDROXYZINE PAMOATE 25 MG/1
25 CAPSULE ORAL 3 TIMES DAILY
Qty: 90 CAPSULE | Refills: 1 | Status: SHIPPED | OUTPATIENT
Start: 2022-11-07 | End: 2023-02-17 | Stop reason: SDUPTHER

## 2022-11-07 RX ADMIN — PNEUMOCOCCAL 20-VALENT CONJUGATE VACCINE 0.5 ML
2.2; 2.2; 2.2; 2.2; 2.2; 2.2; 2.2; 2.2; 2.2; 2.2; 2.2; 2.2; 2.2; 2.2; 2.2; 2.2; 4.4; 2.2; 2.2; 2.2 INJECTION, SUSPENSION INTRAMUSCULAR at 02:11

## 2022-11-07 NOTE — PROGRESS NOTES
Allyssa Sood  11/07/2022  [id number]                  Chief Complaint:  Chief Complaint   Patient presents with    [facility]       History of Present Illness:    63 yo woman w/ pmh of HTN, HLD, COPD, AML s/p chemotherapy that presents to clinic  Reporting neuropathy symptoms are no longer controlled with Gabapentin dose. Would like to increase dose a little  Breathing is okay  Interested in flu and pneumonia vaccines  Interested in blood work    History:  History reviewed. No pertinent past medical history.  Past Surgical History:   Procedure Laterality Date    CHOLECYSTECTOMY      MEDIPORT INSERTION, SINGLE      MEDIPORT REMOVAL       Family History   Problem Relation Age of Onset    Diabetes Mother     Heart attack Father     Heart disease Sister     Diabetes Sister      Social History     Socioeconomic History    Marital status:    Tobacco Use    Smoking status: Every Day     Types: Cigarettes, Vaping with nicotine    Smokeless tobacco: Never    Tobacco comments:     No onger smoking cigarettes, but still vape.    Substance and Sexual Activity    Alcohol use: Not Currently    Drug use: Never     There is no problem list on file for this patient.    Review of patient's allergies indicates:   Allergen Reactions    Lovastatin Dermatitis    Penicillins Shortness Of Breath    Sulfa (sulfonamide antibiotics) Rash         Medications:  Current Outpatient Medications on File Prior to Visit   Medication Sig Dispense Refill    ezetimibe (ZETIA) 10 mg tablet Take 1 tablet (10 mg total) by mouth every evening. 90 tablet 1    gabapentin (NEURONTIN) 800 MG tablet Take 1 tablet (800 mg total) by mouth 3 (three) times daily. 90 tablet 5    hydrOXYzine pamoate (VISTARIL) 25 MG Cap Take 1 capsule (25 mg total) by mouth 3 (three) times daily. 90 capsule 1    lisinopriL 10 MG tablet Take 1 tablet (10 mg total) by mouth once daily. 90 tablet 1    metoprolol succinate (TOPROL-XL) 25 MG 24 hr tablet       pantoprazole  (PROTONIX) 40 MG tablet Take 1 tablet (40 mg total) by mouth once daily. 90 tablet 1    paroxetine (PAXIL) 30 MG tablet Take 1 tablet (30 mg total) by mouth once daily. 90 tablet 1    albuterol (PROVENTIL/VENTOLIN HFA) 90 mcg/actuation inhaler        No current facility-administered medications on file prior to visit.       Medications have been reviewed and reconciled with patient at this visit.    Adverse reactions to current medications reviewed with patient..      Exam:  Wt Readings from Last 3 Encounters:   11/07/22 69.2 kg (152 lb 8.9 oz)     Temp Readings from Last 3 Encounters:   11/07/22 98.1 °F (36.7 °C)     BP Readings from Last 3 Encounters:   11/07/22 132/69     Pulse Readings from Last 3 Encounters:   11/07/22 74     Body mass index is 27.02 kg/m².      ROS:  See HPI for details      All Other ROS: Negative except as stated in HPI.    PE:  General: Alert and oriented, No acute distress.  Head: Normocephalic, Atraumatic.  Eye: Pupils are equal, round and reactive to light, Extraocular movements are intact, Sclera non-icteric.  Ears/Nose/Throat: Normal, Mucosa moist,Clear.  Neck/Thyroid: Supple, Non-tender, No carotid bruit, No palpable thyromegaly or thyroid nodule, No lymphadenopathy,   Respiratory: Clear to auscultation bilaterally; No wheezes, rales or rhonchi,Non-labored respirations, Symmetrical chest wall expansion.  Cardiovascular: Regular rate and rhythm, S1/S2 normal, No murmurs, rubs or gallops.  Gastrointestinal: Soft, Non-tender, Non-distended, Normal bowel sounds, No palpable organomegaly.  Musculoskeletal: Normal range of motion.  Integumentary: Warm, Dry, Intact, No suspicious lesions or rashes.  Extremities: No clubbing, cyanosis or edema  Neurologic: No focal deficits,     Laboratory Reviewed ({Yes)  Lab Results   Component Value Date    WBC 11.1 03/30/2022    HGB 9.4 03/30/2022    HCT 28.8 03/30/2022     03/30/2022    CHOL 217 03/02/2022    TRIG 177 03/02/2022    HDL 62  03/02/2022    ALT 65 03/30/2022    AST 38 03/30/2022     03/30/2022    K 4.7 03/30/2022    CREATININE 0.77 03/30/2022    BUN 24.2 03/30/2022    CO2 26 03/30/2022    TSH 1.350 02/14/2019    INR 1.2 02/17/2022    HGBA1C 5.1 06/19/2017       Allyssa was seen today for establish care.    Diagnoses and all orders for this visit:    Acute myeloid leukemia in remission  -     CBC Auto Differential; Future  -     Comprehensive Metabolic Panel; Future  -     TSH; Future  -     T4, Free; Future    Immunization due  -     pneumoc 20-cortez conj-dip cr(PF) (PREVNAR-20 (PF)) injection Syrg 0.5 mL  -     Influenza - Quadrivalent *Preferred* (6 months+) (PF)    Chronic obstructive pulmonary disease, unspecified COPD type  -     CBC Auto Differential; Future  -     Comprehensive Metabolic Panel; Future  -     TSH; Future  -     T4, Free; Future    Neuropathy  -     CBC Auto Differential; Future  -     Comprehensive Metabolic Panel; Future  -     TSH; Future  -     T4, Free; Future    Hyperlipidemia, unspecified hyperlipidemia type  -     CBC Auto Differential; Future  -     Comprehensive Metabolic Panel; Future  -     TSH; Future  -     T4, Free; Future    Hypertension, unspecified type  -     CBC Auto Differential; Future  -     Comprehensive Metabolic Panel; Future  -     TSH; Future  -     T4, Free; Future    Other orders  -     gabapentin (NEURONTIN) 100 MG capsule; Take 1 capsule (100 mg total) by mouth 3 (three) times daily. Take with 800mg TID  -     gabapentin (NEURONTIN) 800 MG tablet; Take 1 tablet (800 mg total) by mouth 3 (three) times daily.        Assessement as above  Labs today  Medications refilled  Increase Gabapentin to 900mg TID  Flu and prevnar today      Care Plan/Goals: Reviewed    Goals    None         Follow up: No follow-ups on file.

## 2023-02-24 RX ORDER — PANTOPRAZOLE SODIUM 40 MG/1
40 TABLET, DELAYED RELEASE ORAL DAILY
Qty: 90 TABLET | Refills: 0 | Status: SHIPPED | OUTPATIENT
Start: 2023-02-24 | End: 2023-06-07 | Stop reason: SDUPTHER

## 2023-02-24 RX ORDER — HYDROXYZINE PAMOATE 25 MG/1
25 CAPSULE ORAL 3 TIMES DAILY
Qty: 90 CAPSULE | Refills: 0 | Status: SHIPPED | OUTPATIENT
Start: 2023-02-24 | End: 2023-05-10 | Stop reason: SDUPTHER

## 2023-05-07 ENCOUNTER — OFFICE VISIT (OUTPATIENT)
Dept: URGENT CARE | Facility: CLINIC | Age: 63
End: 2023-05-07
Payer: MEDICARE

## 2023-05-07 VITALS
RESPIRATION RATE: 20 BRPM | SYSTOLIC BLOOD PRESSURE: 143 MMHG | TEMPERATURE: 98 F | BODY MASS INDEX: 26.93 KG/M2 | HEIGHT: 63 IN | WEIGHT: 152 LBS | HEART RATE: 72 BPM | OXYGEN SATURATION: 98 % | DIASTOLIC BLOOD PRESSURE: 77 MMHG

## 2023-05-07 DIAGNOSIS — J44.1 COPD EXACERBATION: ICD-10-CM

## 2023-05-07 DIAGNOSIS — R05.9 COUGH, UNSPECIFIED TYPE: Primary | ICD-10-CM

## 2023-05-07 LAB
CTP QC/QA: YES
CTP QC/QA: YES
POC MOLECULAR INFLUENZA A AGN: NEGATIVE
POC MOLECULAR INFLUENZA B AGN: NEGATIVE
SARS-COV-2 RDRP RESP QL NAA+PROBE: NEGATIVE

## 2023-05-07 PROCEDURE — 99214 OFFICE O/P EST MOD 30 MIN: CPT | Mod: 25,,,

## 2023-05-07 PROCEDURE — 87502 POCT INFLUENZA A/B MOLECULAR: ICD-10-PCS | Mod: QW,,,

## 2023-05-07 PROCEDURE — 96372 THER/PROPH/DIAG INJ SC/IM: CPT | Mod: ,,,

## 2023-05-07 PROCEDURE — 87635 SARS-COV-2 COVID-19 AMP PRB: CPT | Mod: QW,,,

## 2023-05-07 PROCEDURE — 87502 INFLUENZA DNA AMP PROBE: CPT | Mod: QW,,,

## 2023-05-07 PROCEDURE — 99214 PR OFFICE/OUTPT VISIT, EST, LEVL IV, 30-39 MIN: ICD-10-PCS | Mod: 25,,,

## 2023-05-07 PROCEDURE — 94640 PR INHAL RX, AIRWAY OBST/DX SPUTUM INDUCT: ICD-10-PCS | Mod: ,,,

## 2023-05-07 PROCEDURE — 87635: ICD-10-PCS | Mod: QW,,,

## 2023-05-07 PROCEDURE — 96372 PR INJECTION,THERAP/PROPH/DIAG2ST, IM OR SUBCUT: ICD-10-PCS | Mod: ,,,

## 2023-05-07 PROCEDURE — 94640 AIRWAY INHALATION TREATMENT: CPT | Mod: ,,,

## 2023-05-07 RX ORDER — PREDNISONE 20 MG/1
30 TABLET ORAL 2 TIMES DAILY
Qty: 15 TABLET | Refills: 0 | Status: SHIPPED | OUTPATIENT
Start: 2023-05-07 | End: 2023-05-12

## 2023-05-07 RX ORDER — DOXYCYCLINE 100 MG/1
100 CAPSULE ORAL EVERY 12 HOURS
Qty: 14 CAPSULE | Refills: 0 | Status: SHIPPED | OUTPATIENT
Start: 2023-05-07 | End: 2023-05-14

## 2023-05-07 RX ORDER — IPRATROPIUM BROMIDE AND ALBUTEROL SULFATE 2.5; .5 MG/3ML; MG/3ML
3 SOLUTION RESPIRATORY (INHALATION)
Status: COMPLETED | OUTPATIENT
Start: 2023-05-07 | End: 2023-05-07

## 2023-05-07 RX ORDER — PROMETHAZINE HYDROCHLORIDE AND DEXTROMETHORPHAN HYDROBROMIDE 6.25; 15 MG/5ML; MG/5ML
5 SYRUP ORAL EVERY 6 HOURS PRN
Qty: 100 ML | Refills: 0 | Status: SHIPPED | OUTPATIENT
Start: 2023-05-07 | End: 2023-05-12

## 2023-05-07 RX ORDER — IPRATROPIUM BROMIDE 0.5 MG/2.5ML
0.5 SOLUTION RESPIRATORY (INHALATION)
Status: DISCONTINUED | OUTPATIENT
Start: 2023-05-07 | End: 2023-05-07

## 2023-05-07 RX ORDER — ALBUTEROL SULFATE 0.83 MG/ML
2.5 SOLUTION RESPIRATORY (INHALATION)
Status: DISCONTINUED | OUTPATIENT
Start: 2023-05-07 | End: 2023-05-07

## 2023-05-07 RX ORDER — BENZONATATE 100 MG/1
100 CAPSULE ORAL 3 TIMES DAILY PRN
Qty: 15 CAPSULE | Refills: 0 | Status: SHIPPED | OUTPATIENT
Start: 2023-05-07 | End: 2023-05-12

## 2023-05-07 RX ADMIN — IPRATROPIUM BROMIDE AND ALBUTEROL SULFATE 3 ML: 2.5; .5 SOLUTION RESPIRATORY (INHALATION) at 01:05

## 2023-05-07 NOTE — PATIENT INSTRUCTIONS
Covid and flu negative   Chest xray negative today   Start the antibiotics today, make sure to take all of the medication even if symptoms improve. Take with food   Start the oral steroids tomorrow   Take the cough medication as needed day or night it does not cause sedation   Follow up with your pcp in 1-2 days for a recheck or return to the clinic   Use your inhaler or nebulizer treatments every 6-8 hours for wheezing, shortness of breath or coughing spells   Humidified air   Rest and hydrate   For any increased shortness of breath, oxygen less than 94%, fever, or weakness or lethargy ect go to the Emergency room immediately

## 2023-05-07 NOTE — PROGRESS NOTES
"Subjective:      Patient ID: Allyssa Sood is a 63 y.o. female.    Vitals:  height is 5' 3" (1.6 m) and weight is 68.9 kg (152 lb). Her tympanic temperature is 98.2 °F (36.8 °C). Her blood pressure is 143/77 (abnormal) and her pulse is 72. Her respiration is 20 and oxygen saturation is 98%.     Chief Complaint: Cough (Cough and congestion x 2 weeks)    A 62 y/o female with a PMH of COPD, AML 10 years in remission and HTN presents to the clinic with c/o productive cough and intermittent shortness of breath x 2 weeks. She denies any fever, body aches, chills, chest pain,  n/v/d, abdominal complaints, rash, difficulty swallowing, neck stiffness, or changes in intake or output.       Cough  Associated symptoms include postnasal drip, shortness of breath and wheezing. Pertinent negatives include no fever or sore throat.     Constitution: Negative for fever.   HENT:  Positive for congestion and postnasal drip. Negative for sore throat.    Neck: neck negative.   Eyes: Negative.    Respiratory:  Positive for cough, sputum production, COPD, shortness of breath and wheezing. Negative for asthma.    Gastrointestinal: Negative.    Allergic/Immunologic: Negative for asthma.    Objective:     Physical Exam   Constitutional: She is oriented to person, place, and time. She appears well-developed. She is cooperative.  Non-toxic appearance. She does not appear ill. No distress (able to speak clearly in full sentences with no distress).   HENT:   Head: Normocephalic and atraumatic.   Ears:   Right Ear: Hearing and external ear normal. A middle ear effusion is present.   Left Ear: Hearing and external ear normal. A middle ear effusion is present.   Nose: Congestion (clear pnd) present.   Mouth/Throat: Mucous membranes are normal. Mucous membranes are moist. Posterior oropharyngeal erythema present. Oropharynx is clear.   Eyes: Conjunctivae and lids are normal.   Neck: Trachea normal and phonation normal. Neck supple. No edema present. " No erythema present. No neck rigidity present.   Cardiovascular: Normal rate.   Pulmonary/Chest: Effort normal. No stridor. No respiratory distress. She has no decreased breath sounds. She has wheezes (generalized expiratory wheezing anterior and posterior). She has no rhonchi. She has no rales.   Abdominal: Normal appearance.   Neurological: She is alert and oriented to person, place, and time. She exhibits normal muscle tone.   Skin: Skin is warm, dry, intact, not diaphoretic and no rash. Capillary refill takes less than 2 seconds.   Psychiatric: Her speech is normal and behavior is normal. Mood normal.   Nursing note and vitals reviewed.    Wheezing slightly improved after duoneb treatment   Assessment:     1. Cough, unspecified type    2. COPD exacerbation        Plan:       Cough, unspecified type  -     POCT COVID-19 Rapid Screening  -     POCT Influenza A/B Molecular  -     X-Ray Chest PA And Lateral  -     albuterol-ipratropium 2.5 mg-0.5 mg/3 mL nebulizer solution 3 mL    COPD exacerbation  -     albuterol-ipratropium 2.5 mg-0.5 mg/3 mL nebulizer solution 3 mL    Other orders  -     doxycycline (MONODOX) 100 MG capsule; Take 1 capsule (100 mg total) by mouth every 12 (twelve) hours. for 7 days  Dispense: 14 capsule; Refill: 0  -     benzonatate (TESSALON) 100 MG capsule; Take 1 capsule (100 mg total) by mouth 3 (three) times daily as needed for Cough.  Dispense: 15 capsule; Refill: 0  -     predniSONE (DELTASONE) 20 MG tablet; Take 1.5 tablets (30 mg total) by mouth 2 (two) times daily. for 5 days  Dispense: 15 tablet; Refill: 0  -     Discontinue: ipratropium 0.02 % nebulizer solution 0.5 mg  -     Discontinue: albuterol nebulizer solution 2.5 mg  -     methylPREDNISolone sod suc(PF) injection 125 mg  -     promethazine-dextromethorphan (PROMETHAZINE-DM) 6.25-15 mg/5 mL Syrp; Take 5 mLs by mouth every 6 (six) hours as needed (cough).  Dispense: 100 mL; Refill: 0    The patient reports that her normal  oxygen saturation is any where from 94-97%  She was able to maintain her saturations at 95-96% while walking and is able to talk in complete sentences without any distress.     Covid and flu negative   Xray negative for for any acute cardiopulmonary process today  Start the antibiotics today, make sure to take all of the medication even if symptoms improve. Take with food   Start the oral steroids tomorrow   Take the cough medication as needed day or night it does not cause sedation   Follow up with your pcp in 1-2 days for a recheck or return to the clinic   Use your inhaler or nebulizer treatments every 6-8 hours for wheezing, shortness of breath or coughing spells   Humidified air   Rest and hydrate   For any increased shortness of breath, oxygen less than 94%, fever, or weakness or lethargy ect go to the Emergency room immediatly.              Additional MDM:     Heart Failure Score:   COPD = Yes

## 2023-05-08 ENCOUNTER — TELEPHONE (OUTPATIENT)
Dept: URGENT CARE | Facility: CLINIC | Age: 63
End: 2023-05-08
Payer: MEDICARE

## 2023-05-08 DIAGNOSIS — J44.1 COPD EXACERBATION: Primary | ICD-10-CM

## 2023-05-08 RX ORDER — IPRATROPIUM BROMIDE AND ALBUTEROL SULFATE 2.5; .5 MG/3ML; MG/3ML
3 SOLUTION RESPIRATORY (INHALATION) EVERY 6 HOURS PRN
Qty: 75 ML | Refills: 0 | Status: SHIPPED | OUTPATIENT
Start: 2023-05-08 | End: 2023-08-10

## 2023-05-08 RX ORDER — BUDESONIDE 0.25 MG/2ML
0.25 INHALANT ORAL DAILY PRN
Qty: 12 ML | Refills: 0 | Status: SHIPPED | OUTPATIENT
Start: 2023-05-08 | End: 2023-05-14

## 2023-05-08 NOTE — PROGRESS NOTES
Per telephone note:    Medication issue (Patient was seen in the clinic on yesterday by KARLEE Dent dx cough. Patient called the clinic stating the NP told her that she would be sending in inhalation solutions for her breathing treatment, but she didn't receive them. She says it was suppose to   be 2 prescriptions (Budesonide Inhalation suspension) and (Ipratropium bromide/albuterol sulfate). Patient is requesting if these prescriptions can be sent to [facility] [id number] in [place]. )

## 2023-05-10 ENCOUNTER — OFFICE VISIT (OUTPATIENT)
Dept: FAMILY MEDICINE | Facility: CLINIC | Age: 63
End: 2023-05-10
Payer: MEDICARE

## 2023-05-10 VITALS
DIASTOLIC BLOOD PRESSURE: 89 MMHG | HEIGHT: 63 IN | TEMPERATURE: 99 F | OXYGEN SATURATION: 98 % | WEIGHT: 148 LBS | BODY MASS INDEX: 26.22 KG/M2 | HEART RATE: 67 BPM | RESPIRATION RATE: 16 BRPM | SYSTOLIC BLOOD PRESSURE: 158 MMHG

## 2023-05-10 DIAGNOSIS — J44.9 CHRONIC OBSTRUCTIVE PULMONARY DISEASE, UNSPECIFIED COPD TYPE: ICD-10-CM

## 2023-05-10 DIAGNOSIS — R73.01 ELEVATED FASTING GLUCOSE: ICD-10-CM

## 2023-05-10 DIAGNOSIS — F32.A ANXIETY AND DEPRESSION: ICD-10-CM

## 2023-05-10 DIAGNOSIS — Z78.0 POSTMENOPAUSAL: ICD-10-CM

## 2023-05-10 DIAGNOSIS — Z12.31 ENCOUNTER FOR SCREENING MAMMOGRAM FOR MALIGNANT NEOPLASM OF BREAST: ICD-10-CM

## 2023-05-10 DIAGNOSIS — E78.5 HYPERLIPIDEMIA, UNSPECIFIED HYPERLIPIDEMIA TYPE: ICD-10-CM

## 2023-05-10 DIAGNOSIS — Z23 IMMUNIZATION DUE: ICD-10-CM

## 2023-05-10 DIAGNOSIS — Z12.11 SCREEN FOR COLON CANCER: ICD-10-CM

## 2023-05-10 DIAGNOSIS — F41.9 ANXIETY AND DEPRESSION: ICD-10-CM

## 2023-05-10 DIAGNOSIS — G62.9 NEUROPATHY: ICD-10-CM

## 2023-05-10 DIAGNOSIS — L23.9 ALLERGIC DERMATITIS: ICD-10-CM

## 2023-05-10 DIAGNOSIS — I10 HYPERTENSION, UNSPECIFIED TYPE: Primary | ICD-10-CM

## 2023-05-10 PROCEDURE — 3008F PR BODY MASS INDEX (BMI) DOCUMENTED: ICD-10-PCS | Mod: CPTII,,, | Performed by: FAMILY MEDICINE

## 2023-05-10 PROCEDURE — 3079F PR MOST RECENT DIASTOLIC BLOOD PRESSURE 80-89 MM HG: ICD-10-PCS | Mod: CPTII,,, | Performed by: FAMILY MEDICINE

## 2023-05-10 PROCEDURE — 1101F PR PT FALLS ASSESS DOC 0-1 FALLS W/OUT INJ PAST YR: ICD-10-PCS | Mod: CPTII,,, | Performed by: FAMILY MEDICINE

## 2023-05-10 PROCEDURE — 90750 HZV VACC RECOMBINANT IM: CPT | Mod: PBBFAC

## 2023-05-10 PROCEDURE — 3288F FALL RISK ASSESSMENT DOCD: CPT | Mod: CPTII,,, | Performed by: FAMILY MEDICINE

## 2023-05-10 PROCEDURE — 3008F BODY MASS INDEX DOCD: CPT | Mod: CPTII,,, | Performed by: FAMILY MEDICINE

## 2023-05-10 PROCEDURE — 3077F PR MOST RECENT SYSTOLIC BLOOD PRESSURE >= 140 MM HG: ICD-10-PCS | Mod: CPTII,,, | Performed by: FAMILY MEDICINE

## 2023-05-10 PROCEDURE — 3077F SYST BP >= 140 MM HG: CPT | Mod: CPTII,,, | Performed by: FAMILY MEDICINE

## 2023-05-10 PROCEDURE — 4010F PR ACE/ARB THEARPY RXD/TAKEN: ICD-10-PCS | Mod: CPTII,,, | Performed by: FAMILY MEDICINE

## 2023-05-10 PROCEDURE — 3079F DIAST BP 80-89 MM HG: CPT | Mod: CPTII,,, | Performed by: FAMILY MEDICINE

## 2023-05-10 PROCEDURE — 3288F PR FALLS RISK ASSESSMENT DOCUMENTED: ICD-10-PCS | Mod: CPTII,,, | Performed by: FAMILY MEDICINE

## 2023-05-10 PROCEDURE — 1101F PT FALLS ASSESS-DOCD LE1/YR: CPT | Mod: CPTII,,, | Performed by: FAMILY MEDICINE

## 2023-05-10 PROCEDURE — 90471 IMMUNIZATION ADMIN: CPT | Mod: PBBFAC

## 2023-05-10 PROCEDURE — 99215 OFFICE O/P EST HI 40 MIN: CPT | Mod: PBBFAC | Performed by: FAMILY MEDICINE

## 2023-05-10 PROCEDURE — 99214 PR OFFICE/OUTPT VISIT, EST, LEVL IV, 30-39 MIN: ICD-10-PCS | Mod: S$PBB,,, | Performed by: FAMILY MEDICINE

## 2023-05-10 PROCEDURE — 4010F ACE/ARB THERAPY RXD/TAKEN: CPT | Mod: CPTII,,, | Performed by: FAMILY MEDICINE

## 2023-05-10 PROCEDURE — 99214 OFFICE O/P EST MOD 30 MIN: CPT | Mod: S$PBB,,, | Performed by: FAMILY MEDICINE

## 2023-05-10 RX ORDER — HYDROXYZINE PAMOATE 25 MG/1
25 CAPSULE ORAL 3 TIMES DAILY
Qty: 90 CAPSULE | Refills: 2 | Status: SHIPPED | OUTPATIENT
Start: 2023-05-10 | End: 2023-08-10 | Stop reason: SDUPTHER

## 2023-05-10 RX ORDER — PAROXETINE HYDROCHLORIDE 40 MG/1
40 TABLET, FILM COATED ORAL DAILY
Qty: 90 TABLET | Refills: 0 | Status: SHIPPED | OUTPATIENT
Start: 2023-05-10 | End: 2023-08-10 | Stop reason: SDUPTHER

## 2023-05-10 RX ORDER — LISINOPRIL 10 MG/1
10 TABLET ORAL DAILY
Qty: 30 TABLET | Refills: 2 | Status: SHIPPED | OUTPATIENT
Start: 2023-05-10 | End: 2023-08-10 | Stop reason: SDUPTHER

## 2023-05-10 RX ORDER — METOPROLOL SUCCINATE 25 MG/1
25 TABLET, EXTENDED RELEASE ORAL DAILY
Qty: 30 TABLET | Refills: 2 | Status: SHIPPED | OUTPATIENT
Start: 2023-05-10 | End: 2023-08-10 | Stop reason: SDUPTHER

## 2023-05-10 RX ADMIN — ZOSTER VACCINE RECOMBINANT, ADJUVANTED 0.5 ML: KIT at 01:05

## 2023-05-10 NOTE — PROGRESS NOTES
Patient Name: Allyssa Sood   : 1960  MRN: [id number]     SUBJECTIVE:  Allyssa Sood is a 63 y.o. female here for Follow-up (Patient would like to discuss an allergist referral. She states that she breaks out in rashes all over her body and been having issues for over five years.)  .    HPI  PMHx of HTN, HLD, COPD, AML s/p chemotherapy 10 years in remission here for routine follow-up.  Feels well, except for the cough/COPD exacerbation.  Went to urgent care 3 days ago, prescribed prednisone and doxycycline, inhalers.  Chest x-ray normal.  COVID influenza negative.  Feels much better.  Never had PFT done, was told many years ago she had COPD.   Over 5 years, different doctors for rash, out of nowhere, flares up. Has seen dermatology. Would like to see allergist.  Compliant with medications.  Not checking bp at home.  Taking lisinopril 10 mg daily, but chart reviewing blood pressure has been elevated in the past too. Used to be on toprol as needed if bp elevated. Not taking it daily.  Sees cardiology. Questionable hx of CHF. On zetia.   Last visit with previous PCP, gabapentin was increased to 900 mg 3 times a day for the neuropathy from chemotherapy. Sometimes has muscle cramps.  Takes hydroxyzine one a day for sleep and 2 at night and paxil for PTSD/depression.   Takes multivitamin daily.  Last colon cancer screen: over 5 years ago colonoscopy.       ALLERGIES:   Review of patient's allergies indicates:   Allergen Reactions    Lovastatin Dermatitis    Penicillins Shortness Of Breath    Sulfa (sulfonamide antibiotics) Rash         ROS:  Review of Systems   Constitutional:  Negative for chills, fever and weight loss.   HENT:  Negative for congestion.    Eyes:  Negative for blurred vision and pain.   Respiratory:  Positive for cough (improving), shortness of breath (improving) and wheezing (improving).    Cardiovascular:  Negative for chest pain, palpitations and leg swelling.   Gastrointestinal:  Negative for  "abdominal pain, blood in stool, constipation, diarrhea, nausea and vomiting.   Genitourinary:  Negative for dysuria and hematuria.   Skin:  Positive for rash.   Neurological:  Negative for dizziness, focal weakness and headaches.   Psychiatric/Behavioral:  Negative for depression. The patient is not nervous/anxious and does not have insomnia (With the help of hydroxyzine).        OBJECTIVE:  Vital signs  Vitals:    05/10/23 1309   BP: (!) 158/89   Pulse: 67   Resp: 16   Temp: 99.1 °F (37.3 °C)   TempSrc: Oral   SpO2: 98%   Weight: 67.1 kg (148 lb)   Height: 5' 3" (1.6 m)      Body mass index is 26.22 kg/m².    PHYSICAL EXAM:   Physical Exam  Vitals reviewed.   Constitutional:       General: She is not in acute distress.     Appearance: Normal appearance. She is not ill-appearing.   HENT:      Head: Normocephalic and atraumatic.      Right Ear: External ear normal.      Left Ear: External ear normal.      Nose: Nose normal. No rhinorrhea.      Mouth/Throat:      Mouth: Mucous membranes are moist.   Eyes:      General: No scleral icterus.        Right eye: No discharge.         Left eye: No discharge.      Conjunctiva/sclera: Conjunctivae normal.      Pupils: Pupils are equal, round, and reactive to light.   Cardiovascular:      Rate and Rhythm: Normal rate and regular rhythm.      Heart sounds: No murmur heard.  Pulmonary:      Effort: Pulmonary effort is normal. No respiratory distress.      Breath sounds: No wheezing, rhonchi or rales.   Abdominal:      General: Bowel sounds are normal. There is no distension.      Palpations: Abdomen is soft.      Tenderness: There is no abdominal tenderness.   Musculoskeletal:         General: Normal range of motion.      Cervical back: Normal range of motion and neck supple. No rigidity or tenderness.      Right lower leg: No edema.      Left lower leg: No edema.   Skin:     General: Skin is warm.      Findings: No rash (no flare-up at this time).   Neurological:      General: " No focal deficit present.      Mental Status: She is alert and oriented to person, place, and time.   Psychiatric:         Mood and Affect: Mood normal.         Behavior: Behavior normal.        ASSESSMENT/PLAN:  Allyssa was seen today for follow-up.    Diagnoses and all orders for this visit:    Hypertension, unspecified type  -     lisinopriL 10 MG tablet; Take 1 tablet (10 mg total) by mouth once daily.  -     Comprehensive Metabolic Panel; Future  -     CBC Auto Differential; Future  -     metoprolol succinate (TOPROL-XL) 25 MG 24 hr tablet; Take 1 tablet (25 mg total) by mouth once daily.  -     Lipid Panel; Future  -     Hemoglobin A1C; Future    Anxiety and depression  -     hydrOXYzine pamoate (VISTARIL) 25 MG Cap; Take 1 capsule (25 mg total) by mouth 3 (three) times daily.  -     paroxetine (PAXIL) 40 MG tablet; Take 1 tablet (40 mg total) by mouth once daily.    Neuropathy  -     Magnesium; Future    Allergic dermatitis  -     Ambulatory referral/consult to Allergy; Future    Hyperlipidemia, unspecified hyperlipidemia type    Chronic obstructive pulmonary disease, unspecified COPD type  -     Ambulatory referral/consult to Pulmonology; Future    Encounter for screening mammogram for malignant neoplasm of breast  -     Mammo Digital Screening Bilat w/ Sandor; Future    Immunization due  -     varicella-zoster [id number] (PF)(SHINGRIX) 50 mcg/0.5 mL injection    Postmenopausal  -     DXA Bone Density Axial Skeleton 1 or more sites; Future    Elevated fasting glucose  -     Hemoglobin A1C; Future    Screen for colon cancer  -     Cologuard Screening (Multitarget Stool DNA); Future  -     Cologuard Screening (Multitarget Stool DNA)    Other orders  The following orders have not been finalized:  -     Cancel: Tdap (BOOSTRIX) vaccine injection 0.5 mL      Plan  -uncontrolled hypertension.  Advised patient to continue with lisinopril 10 mg and to start taking Toprol every day instead of rarely/as needed that she was  doing it.  Check basic labs  -mood is well-controlled on Paxil and hydroxyzine as needed.  Hydroxyzine helping with sleep as well.  Patient would like to try to increase dose of Paxil to 40 mg as she has been on 30 mg for a long time.  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  Check magnesium  -refer to allergist per patient's request for the flare up of the rash that she is experiencing. patient believes is coming from some type of allergies.  Will need to request records from dermatology.  -COPD exacerbation improving.  Finish regimen as directed.  Has not had PFTs done in awhile, refer to pulmonology.  -updated preventive healthcare as above        Previous medical history/lab work/radiology reviewed and considered during medical management decisions.   Medication list reviewed and medication reconciliation performed.  Patient was provided  and care about his/her current diagnosis (es) and medications including risk/benefit and side effects/adverse events, over the counter medication uses/doses, home self-care and contact precautions,  and red flags and indications for when to seek immediate medical attention.   Patient was advised to continue compliance with current medication list and medical recommendations.  Recommended/ Advised continued compliance with recommended eating habits/ diets for medical conditions and exercise 150 minutes/ week (if possible) for medical condition (s).        RESULTS:  Recent Results (from the past 1008 hour(s))   POCT COVID-19 Rapid Screening    Collection Time: 05/07/23  1:16 PM   Result Value Ref Range    POC Rapid COVID Negative Negative     Acceptable Yes    POCT Influenza A/B Molecular    Collection Time: 05/07/23  1:16 PM   Result Value Ref Range    POC Molecular Influenza A Ag Negative Negative, Not Reported    POC Molecular Influenza B Ag Negative Negative, Not Reported     Acceptable Yes           Follow Up:   In 3 months for hypertension, anxiety    [unfilled]    This note was created with the assistance of a voice recognition software or phone dictation. There may be transcription errors as a result of using this technology however minimal. Effort has been made to assure accuracy of transcription but any obvious errors or omissions should be clarified with the author of the document

## 2023-05-11 ENCOUNTER — OFFICE VISIT (OUTPATIENT)
Dept: URGENT CARE | Facility: CLINIC | Age: 63
End: 2023-05-11
Payer: MEDICARE

## 2023-05-11 VITALS
DIASTOLIC BLOOD PRESSURE: 87 MMHG | HEIGHT: 63 IN | BODY MASS INDEX: 26.22 KG/M2 | WEIGHT: 148 LBS | RESPIRATION RATE: 20 BRPM | TEMPERATURE: 98 F | SYSTOLIC BLOOD PRESSURE: 178 MMHG | HEART RATE: 65 BPM

## 2023-05-11 DIAGNOSIS — R30.0 DYSURIA: ICD-10-CM

## 2023-05-11 DIAGNOSIS — R21 RASH: ICD-10-CM

## 2023-05-11 DIAGNOSIS — R35.0 URINARY FREQUENCY: Primary | ICD-10-CM

## 2023-05-11 LAB
BILIRUB UR QL STRIP: NEGATIVE
GLUCOSE UR QL STRIP: NEGATIVE
KETONES UR QL STRIP: NEGATIVE
LEUKOCYTE ESTERASE UR QL STRIP: NEGATIVE
PH, POC UA: 6.5
POC BLOOD, URINE: NEGATIVE
POC NITRATES, URINE: NEGATIVE
PROT UR QL STRIP: NEGATIVE
SP GR UR STRIP: 1.01 (ref 1–1.03)
UROBILINOGEN UR STRIP-ACNC: NORMAL (ref 0.3–2.2)

## 2023-05-11 PROCEDURE — 99203 PR OFFICE/OUTPT VISIT, NEW, LEVL III, 30-44 MIN: ICD-10-PCS | Mod: ,,, | Performed by: NURSE PRACTITIONER

## 2023-05-11 PROCEDURE — 99203 OFFICE O/P NEW LOW 30 MIN: CPT | Mod: ,,, | Performed by: NURSE PRACTITIONER

## 2023-05-11 PROCEDURE — 87077 CULTURE AEROBIC IDENTIFY: CPT | Performed by: NURSE PRACTITIONER

## 2023-05-11 PROCEDURE — 81003 POCT URINALYSIS, DIPSTICK, AUTOMATED, W/O SCOPE: ICD-10-PCS | Mod: QW,,, | Performed by: NURSE PRACTITIONER

## 2023-05-11 PROCEDURE — 81003 URINALYSIS AUTO W/O SCOPE: CPT | Mod: QW,,, | Performed by: NURSE PRACTITIONER

## 2023-05-11 NOTE — PATIENT INSTRUCTIONS
Dysuria    Maintain adequate hydration.    Monitor symptoms closely.    Watch out for worsening urinary symptoms, blood in the urine, flank pain, fever, chills, myalgias, and vomiting.    Seek further medical attention immediately at the 1st sign.    Follow-up with your PCP within 72 hours.    We will call you with urine culture results within the next 24-48 hours.     Rash    Chronic rash continue prednisone and topical medication recommendations and follow-up with your dermatologist as planned.

## 2023-05-11 NOTE — PROGRESS NOTES
"Subjective:      Patient ID: Allyssa Sood is a 63 y.o. female.    Vitals:  height is 5' 3" (1.6 m) and weight is 67.1 kg (148 lb). Her oral temperature is 98.4 °F (36.9 °C). Her blood pressure is 178/87 (abnormal) and her pulse is 65. Her respiration is 20.     Chief Complaint: Rash (Rash, blurry vision in right eye x 5 days. On Doxycycline and prednisone since 5/7 office visit. States she has had ongoing skin irritation for 5 years and this feels like a flare up. Saw PCP for same issue yesterday. )    63-year-old female presents with urinary frequency, urgency, and intermittent dysuria.  Onset several days ago.  No abdominal, flank pain or fever.  Also chronic rash currently on prednisone and Vistaril.  Also intermittent periods of right eye blurry no foreign body sensation or visual disturbances.  Does not feel blurry now.  States she feels her body is over taken by some type of a pathogen.  Patient does have a PCP at [facility] and does see a dermatologist.  Also the patient asked for a urine culture to be done to see if any bacterial pathogens exist in her body.    Eyes:  Positive for blurred vision (intermittent right eye, none now).   Genitourinary:  Positive for dysuria, frequency, urgency and urine decreased. Negative for flank pain, hematuria and vaginal pain.   Skin:  Positive for rash (feels like a rash but no lesions now).    Objective:     Physical Exam   Constitutional: She is oriented to person, place, and time. She appears well-developed. She is cooperative.  Non-toxic appearance. She does not appear ill. No distress.   HENT:   Head: Normocephalic and atraumatic.   Ears:   Right Ear: Hearing, tympanic membrane, external ear and ear canal normal.   Left Ear: Hearing, tympanic membrane, external ear and ear canal normal.   Nose: Nose normal. No mucosal edema, rhinorrhea or nasal deformity. No epistaxis. Right sinus exhibits no maxillary sinus tenderness and no frontal sinus tenderness. Left sinus exhibits " no maxillary sinus tenderness and no frontal sinus tenderness.   Mouth/Throat: Uvula is midline, oropharynx is clear and moist and mucous membranes are normal. No trismus in the jaw. Normal dentition. No uvula swelling. No oropharyngeal exudate, posterior oropharyngeal edema or posterior oropharyngeal erythema.   Eyes: Conjunctivae and lids are normal. No scleral icterus.   Neck: Trachea normal and phonation normal. Neck supple. No edema present. No erythema present. No neck rigidity present.   Cardiovascular: Normal rate, regular rhythm, normal heart sounds and normal pulses.   Pulmonary/Chest: Effort normal and breath sounds normal. No respiratory distress. She has no decreased breath sounds. She has no rhonchi.   Abdominal: Normal appearance. Soft. flat abdomen   Musculoskeletal: Normal range of motion.         General: No deformity. Normal range of motion.   Neurological: She is alert and oriented to person, place, and time. She exhibits normal muscle tone. Coordination normal.   Skin: Skin is warm, dry, intact, not diaphoretic and not pale.   Psychiatric: Her speech is normal and behavior is normal. Judgment and thought content normal.   Nursing note and vitals reviewed.    Assessment:     1. Urinary frequency    2. Dysuria    3. Rash      Office Visit on 05/11/2023   Component Date Value Ref Range Status    POC Blood, Urine 05/11/2023 Negative  Negative Final    POC Bilirubin, Urine 05/11/2023 Negative  Negative Final    POC Ketones, Urine 05/11/2023 Negative  Negative Final    POC Protein, Urine 05/11/2023 Negative  Negative Final    POC Nitrates, Urine 05/11/2023 Negative  Negative Final    POC Glucose, Urine 05/11/2023 Negative  Negative Final    pH, UA 05/11/2023 6.5   Final    POC Specific Gravity, Urine 05/11/2023 1.010  1.003 - 1.029 Final    POC Leukocytes, Urine 05/11/2023 Negative  Negative Final      Plan:   Dysuria    Maintain adequate hydration.    Monitor symptoms closely.    Watch out  for worsening urinary symptoms, blood in the urine, flank pain, fever, chills, myalgias, and vomiting.    Seek further medical attention immediately at the 1st sign.    Follow-up with your PCP within 72 hours.    We will call you with urine culture results within the next 24-48 hours.     Rash    Chronic rash continue prednisone and topical medication recommendations by PCP and follow-up with your dermatologist as planned.    Urinary frequency  -     POCT Urinalysis, Dipstick, Automated, W/O Scope    Dysuria  -     Urine culture    Rash

## 2023-05-13 LAB — BACTERIA UR CULT: ABNORMAL

## 2023-06-06 RX ORDER — GABAPENTIN 800 MG/1
800 TABLET ORAL 3 TIMES DAILY
Qty: 90 TABLET | Refills: 5 | Status: CANCELLED | OUTPATIENT
Start: 2023-06-06 | End: 2024-06-05

## 2023-06-06 RX ORDER — PANTOPRAZOLE SODIUM 40 MG/1
40 TABLET, DELAYED RELEASE ORAL DAILY
Qty: 90 TABLET | Refills: 0 | Status: CANCELLED | OUTPATIENT
Start: 2023-06-06

## 2023-06-06 RX ORDER — GABAPENTIN 100 MG/1
100 CAPSULE ORAL 3 TIMES DAILY
Qty: 90 CAPSULE | Refills: 5 | Status: CANCELLED | OUTPATIENT
Start: 2023-06-06 | End: 2024-06-05

## 2023-06-06 NOTE — TELEPHONE ENCOUNTER
----- Message from Veronica Yuan sent at 6/6/2023  1:24 PM CDT -----  Regarding: Refill-patient is out of medication  Provider: Dr Dennise Mcdaniel    Preferred Pharmacy: [id number] - [place] - [id number]    Last Visit:  Next Visit: 8/10/2023  Patient's Contact Number:    1. Name of Medication: pantoprazole (PROTONIX) 40 MG tablet  Dosage:  Comments:    2. Name of Medication: gabapentin (NEURONTIN) 800 MG tablet  Dosage:  Comments:    3. Name of Medication: gabapentin (NEURONTIN) 100 MG capsule  Dosage:  Comments    4. Name of Medication:  Dosage:  Comments:    5. Name of Medication:  Dosage:  Comments:

## 2023-06-07 RX ORDER — GABAPENTIN 100 MG/1
100 CAPSULE ORAL 3 TIMES DAILY
Qty: 90 CAPSULE | Refills: 1 | Status: SHIPPED | OUTPATIENT
Start: 2023-06-07 | End: 2023-08-10 | Stop reason: SDUPTHER

## 2023-06-07 RX ORDER — PANTOPRAZOLE SODIUM 40 MG/1
40 TABLET, DELAYED RELEASE ORAL DAILY
Qty: 90 TABLET | Refills: 0 | Status: SHIPPED | OUTPATIENT
Start: 2023-06-07 | End: 2023-10-19

## 2023-07-11 NOTE — TELEPHONE ENCOUNTER
----- Message from Karely Terry sent at 7/11/2023  8:51 AM CDT -----  Regarding: Refill  Provider: Dr. Katie Mcdaniel  Preferred Pharmacy: [facility]  Last Visit:   Next Visit: 8/10/23  Patient's Contact Number:     1. Name of Medication: gabapentin (NEURONTIN) 800 MG tablet  Dosage: 90  Comments:     2. Name of Medication: gabapentin (NEURONTIN) 100 MG capsule  Dosage: 90  Comments:     3. Name of Medication:   Dosage:   Comments     4. Name of Medication:   Dosage:   Comments:     5. Name of Medication:   Dosage:   Comments:

## 2023-07-12 RX ORDER — GABAPENTIN 800 MG/1
800 TABLET ORAL 3 TIMES DAILY
Qty: 90 TABLET | Refills: 0 | Status: SHIPPED | OUTPATIENT
Start: 2023-07-12 | End: 2023-08-10 | Stop reason: SDUPTHER

## 2023-08-10 ENCOUNTER — OFFICE VISIT (OUTPATIENT)
Dept: FAMILY MEDICINE | Facility: CLINIC | Age: 63
End: 2023-08-10
Payer: MEDICARE

## 2023-08-10 VITALS
DIASTOLIC BLOOD PRESSURE: 74 MMHG | TEMPERATURE: 99 F | OXYGEN SATURATION: 98 % | HEIGHT: 63 IN | HEART RATE: 60 BPM | WEIGHT: 149 LBS | BODY MASS INDEX: 26.4 KG/M2 | RESPIRATION RATE: 18 BRPM | SYSTOLIC BLOOD PRESSURE: 130 MMHG

## 2023-08-10 DIAGNOSIS — E78.5 HYPERLIPIDEMIA, UNSPECIFIED HYPERLIPIDEMIA TYPE: ICD-10-CM

## 2023-08-10 DIAGNOSIS — F41.9 ANXIETY AND DEPRESSION: ICD-10-CM

## 2023-08-10 DIAGNOSIS — I10 HYPERTENSION, UNSPECIFIED TYPE: Primary | ICD-10-CM

## 2023-08-10 DIAGNOSIS — Z23 IMMUNIZATION DUE: ICD-10-CM

## 2023-08-10 DIAGNOSIS — F32.A ANXIETY AND DEPRESSION: ICD-10-CM

## 2023-08-10 DIAGNOSIS — G62.9 NEUROPATHY: ICD-10-CM

## 2023-08-10 PROCEDURE — 3078F DIAST BP <80 MM HG: CPT | Mod: CPTII,,, | Performed by: FAMILY MEDICINE

## 2023-08-10 PROCEDURE — 3078F PR MOST RECENT DIASTOLIC BLOOD PRESSURE < 80 MM HG: ICD-10-PCS | Mod: CPTII,,, | Performed by: FAMILY MEDICINE

## 2023-08-10 PROCEDURE — 3075F SYST BP GE 130 - 139MM HG: CPT | Mod: CPTII,,, | Performed by: FAMILY MEDICINE

## 2023-08-10 PROCEDURE — 99214 PR OFFICE/OUTPT VISIT, EST, LEVL IV, 30-39 MIN: ICD-10-PCS | Mod: S$PBB,,, | Performed by: FAMILY MEDICINE

## 2023-08-10 PROCEDURE — 99214 OFFICE O/P EST MOD 30 MIN: CPT | Mod: S$PBB,,, | Performed by: FAMILY MEDICINE

## 2023-08-10 PROCEDURE — 3008F BODY MASS INDEX DOCD: CPT | Mod: CPTII,,, | Performed by: FAMILY MEDICINE

## 2023-08-10 PROCEDURE — 3008F PR BODY MASS INDEX (BMI) DOCUMENTED: ICD-10-PCS | Mod: CPTII,,, | Performed by: FAMILY MEDICINE

## 2023-08-10 PROCEDURE — 4010F PR ACE/ARB THEARPY RXD/TAKEN: ICD-10-PCS | Mod: CPTII,,, | Performed by: FAMILY MEDICINE

## 2023-08-10 PROCEDURE — 3075F PR MOST RECENT SYSTOLIC BLOOD PRESS GE 130-139MM HG: ICD-10-PCS | Mod: CPTII,,, | Performed by: FAMILY MEDICINE

## 2023-08-10 PROCEDURE — 99213 OFFICE O/P EST LOW 20 MIN: CPT | Mod: PBBFAC | Performed by: FAMILY MEDICINE

## 2023-08-10 PROCEDURE — 90471 IMMUNIZATION ADMIN: CPT | Mod: PBBFAC

## 2023-08-10 PROCEDURE — 1101F PT FALLS ASSESS-DOCD LE1/YR: CPT | Mod: CPTII,,, | Performed by: FAMILY MEDICINE

## 2023-08-10 PROCEDURE — 1159F PR MEDICATION LIST DOCUMENTED IN MEDICAL RECORD: ICD-10-PCS | Mod: CPTII,,, | Performed by: FAMILY MEDICINE

## 2023-08-10 PROCEDURE — 3288F PR FALLS RISK ASSESSMENT DOCUMENTED: ICD-10-PCS | Mod: CPTII,,, | Performed by: FAMILY MEDICINE

## 2023-08-10 PROCEDURE — 1159F MED LIST DOCD IN RCRD: CPT | Mod: CPTII,,, | Performed by: FAMILY MEDICINE

## 2023-08-10 PROCEDURE — 4010F ACE/ARB THERAPY RXD/TAKEN: CPT | Mod: CPTII,,, | Performed by: FAMILY MEDICINE

## 2023-08-10 PROCEDURE — 90750 HZV VACC RECOMBINANT IM: CPT | Mod: PBBFAC

## 2023-08-10 PROCEDURE — 1101F PR PT FALLS ASSESS DOC 0-1 FALLS W/OUT INJ PAST YR: ICD-10-PCS | Mod: CPTII,,, | Performed by: FAMILY MEDICINE

## 2023-08-10 PROCEDURE — 3288F FALL RISK ASSESSMENT DOCD: CPT | Mod: CPTII,,, | Performed by: FAMILY MEDICINE

## 2023-08-10 RX ORDER — HYDROXYZINE PAMOATE 25 MG/1
25 CAPSULE ORAL 3 TIMES DAILY
Qty: 90 CAPSULE | Refills: 2 | Status: SHIPPED | OUTPATIENT
Start: 2023-08-10 | End: 2023-11-27 | Stop reason: SDUPTHER

## 2023-08-10 RX ORDER — METOPROLOL SUCCINATE 25 MG/1
25 TABLET, EXTENDED RELEASE ORAL DAILY
Qty: 90 TABLET | Refills: 1 | Status: SHIPPED | OUTPATIENT
Start: 2023-08-10 | End: 2023-11-27 | Stop reason: SDUPTHER

## 2023-08-10 RX ORDER — GABAPENTIN 800 MG/1
800 TABLET ORAL 3 TIMES DAILY
Qty: 90 TABLET | Refills: 2 | Status: SHIPPED | OUTPATIENT
Start: 2023-08-10 | End: 2023-11-10

## 2023-08-10 RX ORDER — PAROXETINE HYDROCHLORIDE 40 MG/1
40 TABLET, FILM COATED ORAL DAILY
Qty: 90 TABLET | Refills: 0 | Status: SHIPPED | OUTPATIENT
Start: 2023-08-10 | End: 2023-11-27 | Stop reason: SDUPTHER

## 2023-08-10 RX ORDER — EZETIMIBE 10 MG/1
10 TABLET ORAL NIGHTLY
Qty: 90 TABLET | Refills: 1 | Status: SHIPPED | OUTPATIENT
Start: 2023-08-10 | End: 2023-11-27 | Stop reason: SDUPTHER

## 2023-08-10 RX ORDER — LISINOPRIL 10 MG/1
10 TABLET ORAL DAILY
Qty: 90 TABLET | Refills: 1 | Status: SHIPPED | OUTPATIENT
Start: 2023-08-10 | End: 2023-11-27 | Stop reason: SDUPTHER

## 2023-08-10 RX ORDER — GABAPENTIN 100 MG/1
100 CAPSULE ORAL 3 TIMES DAILY
Qty: 90 CAPSULE | Refills: 2 | Status: SHIPPED | OUTPATIENT
Start: 2023-08-10 | End: 2023-11-10

## 2023-08-10 RX ADMIN — ZOSTER VACCINE RECOMBINANT, ADJUVANTED 0.5 ML: KIT at 02:08

## 2023-08-10 NOTE — PROGRESS NOTES
"  Patient Name: Allyssa Sood   : 1960  MRN: [id number]     SUBJECTIVE:  Allyssa Sood is a 63 y.o. female here for Follow-up (The patient states that she needs to put batteries in her blood  pressure machine to continue to check regularly. Her anxiety is getting better with medication. )  .    HPI  Here for close follow up of HTN and anxiety  Went to do labs before the visit but not covered. Working on that.  Well controlled bp since taking amlodipine and toprol daily.    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 Does help a lot.    Used to follow with cardiology. Questionable hx of CHF. On Zetia. No chest pain, sob, leg swelling.  Takes hydroxyzine as needed and paxil for PTSD/depression. Last visit incrased paxil to 40 mg and has seen great improvement. Patient happy about this change as she feels much better.    Tetanus not covered.    ALLERGIES:   Review of patient's allergies indicates:   Allergen Reactions    Lovastatin Dermatitis    Penicillins Shortness Of Breath    Sulfa (sulfonamide antibiotics) Rash         ROS:  Review of Systems   Constitutional:  Negative for chills, fever and weight loss.   HENT:  Negative for congestion.    Eyes:  Negative for blurred vision.   Respiratory:  Negative for cough and shortness of breath.    Cardiovascular:  Negative for chest pain, palpitations and leg swelling.   Gastrointestinal:  Negative for abdominal pain, blood in stool, diarrhea, nausea and vomiting.   Genitourinary:  Negative for dysuria and hematuria.   Neurological:  Negative for dizziness and headaches.   Psychiatric/Behavioral:  Negative for depression. The patient is not nervous/anxious.          OBJECTIVE:  Vital signs  Vitals:    08/10/23 1345   BP: 130/74   Pulse: 60   Resp: 18   Temp: 98.6 °F (37 °C)   TempSrc: Oral   SpO2: 98%   Weight: 67.6 kg (149 lb)   Height: 5' 3" (1.6 m)      Body mass index is 26.39 kg/m².    PHYSICAL EXAM:   Physical Exam  Vitals " reviewed.   Constitutional:       General: She is not in acute distress.     Appearance: Normal appearance. She is not ill-appearing.   HENT:      Head: Normocephalic and atraumatic.      Right Ear: External ear normal.      Left Ear: External ear normal.      Nose: Nose normal. No rhinorrhea.      Mouth/Throat:      Mouth: Mucous membranes are moist.   Eyes:      General: No scleral icterus.        Right eye: No discharge.         Left eye: No discharge.      Conjunctiva/sclera: Conjunctivae normal.      Pupils: Pupils are equal, round, and reactive to light.   Cardiovascular:      Rate and Rhythm: Normal rate and regular rhythm.   Pulmonary:      Effort: Pulmonary effort is normal. No respiratory distress.      Breath sounds: No wheezing, rhonchi or rales.   Abdominal:      General: Bowel sounds are normal. There is no distension.      Palpations: Abdomen is soft.      Tenderness: There is no abdominal tenderness.   Musculoskeletal:      Cervical back: Normal range of motion and neck supple. No rigidity or tenderness.      Right lower leg: No edema.      Left lower leg: No edema.   Skin:     General: Skin is warm.      Coloration: Skin is not pale.      Findings: No rash.   Neurological:      General: No focal deficit present.      Mental Status: She is alert and oriented to person, place, and time.   Psychiatric:         Mood and Affect: Mood normal.         Behavior: Behavior normal.          ASSESSMENT/PLAN:  1. Hypertension, unspecified type  -     metoprolol succinate (TOPROL-XL) 25 MG 24 hr tablet; Take 1 tablet (25 mg total) by mouth once daily.  Dispense: 90 tablet; Refill: 1  -     lisinopriL 10 MG tablet; Take 1 tablet (10 mg total) by mouth once daily.  Dispense: 90 tablet; Refill: 1    2. Anxiety and depression  -     paroxetine (PAXIL) 40 MG tablet; Take 1 tablet (40 mg total) by mouth once daily.  Dispense: 90 tablet; Refill: 0  -     hydrOXYzine pamoate (VISTARIL) 25 MG Cap; Take 1 capsule (25 mg  total) by mouth 3 (three) times daily.  Dispense: 90 capsule; Refill: 2    3. Hyperlipidemia, unspecified hyperlipidemia type  -     ezetimibe (ZETIA) 10 mg tablet; Take 1 tablet (10 mg total) by mouth every evening.  Dispense: 90 tablet; Refill: 1    4. Neuropathy  -     gabapentin (NEURONTIN) 100 MG capsule; Take 1 capsule (100 mg total) by mouth 3 (three) times daily. Take with 800mg TID  Dispense: 90 capsule; Refill: 2  -     gabapentin (NEURONTIN) 800 MG tablet; Take 1 tablet (800 mg total) by mouth 3 (three) times daily.  Dispense: 90 tablet; Refill: 2    5. Immunization due  -     varicella-zoster gE-AS01B (PF)(SHINGRIX) 50 mcg/0.5 mL injection       PLAN  - Well controlled HTN. Continue with lisinopril 10 mg and Toprol 25 mg qd.  - well controlled anxiety and depression with paxil 40 mg, hydroxyzine as needed.  - no recent lipid panel. Pt will have labs done. Continue with zetia  - neuropathy well controlled. Continue with gabapentin 900 mg TID  - shingles vaccine given        Previous medical history/lab work/radiology reviewed and considered during medical management decisions.   Medication list reviewed and medication reconciliation performed.  Patient was provided  and care about his/her current diagnosis (es) and medications including risk/benefit and side effects/adverse events, over the counter medication uses/doses, home self-care and contact precautions,  and red flags and indications for when to seek immediate medical attention.   Patient was advised to continue compliance with current medication list and medical recommendations.  Recommended/ Advised continued compliance with recommended eating habits/ diets for medical conditions and exercise 150 minutes/ week (if possible) for medical condition (s).        RESULTS:  No results found for this or any previous visit (from the past 1008 hour(s)).      Follow Up:  Follow up in about 6 months (around 2/10/2024) for htn, neuropathy, anxiety.      [unfilled]    This note was created with the assistance of a voice recognition software or phone dictation. There may be transcription errors as a result of using this technology however minimal. Effort has been made to assure accuracy of transcription but any obvious errors or omissions should be clarified with the author of the document

## 2023-08-14 ENCOUNTER — TELEPHONE (OUTPATIENT)
Dept: FAMILY MEDICINE | Facility: CLINIC | Age: 63
End: 2023-08-14
Payer: MEDICARE

## 2023-08-14 NOTE — TELEPHONE ENCOUNTER
----- Message from Veronica Yuan sent at 8/14/2023 10:50 AM CDT -----  Regarding: Refills  Provider: Dr Dennise Mcdaniel  Preferred Pharmacy: [address]  Last Visit: 8/10/2023  Next Visit:   Patient's Contact Number:    1. Name of Medication: Paroxetine HCL  Dosage: 40 mg Tab  Comments:    2. Name of Medication: Lisinopril  Dosage: 10 mg tab  Comments:    3. Name of Medication: Gabapentin  Dosage: 800 mg tab  Comments    4. Name of Medication:  Dosage:  Comments:    5. Name of Medication:  Dosage:  Comments:

## 2023-10-19 RX ORDER — PANTOPRAZOLE SODIUM 40 MG/1
40 TABLET, DELAYED RELEASE ORAL
Qty: 90 TABLET | Refills: 0 | Status: SHIPPED | OUTPATIENT
Start: 2023-10-19 | End: 2023-11-14

## 2023-11-10 DIAGNOSIS — G62.9 NEUROPATHY: ICD-10-CM

## 2023-11-10 RX ORDER — GABAPENTIN 100 MG/1
CAPSULE ORAL
Qty: 90 CAPSULE | Refills: 2 | Status: SHIPPED | OUTPATIENT
Start: 2023-11-10 | End: 2024-02-16

## 2023-11-10 RX ORDER — GABAPENTIN 800 MG/1
800 TABLET ORAL 3 TIMES DAILY
Qty: 90 TABLET | Refills: 2 | Status: SHIPPED | OUTPATIENT
Start: 2023-11-10 | End: 2024-01-17

## 2023-11-14 RX ORDER — PANTOPRAZOLE SODIUM 40 MG/1
40 TABLET, DELAYED RELEASE ORAL
Qty: 90 TABLET | Refills: 0 | Status: SHIPPED | OUTPATIENT
Start: 2023-11-14 | End: 2023-11-27 | Stop reason: SDUPTHER

## 2023-11-27 ENCOUNTER — TELEPHONE (OUTPATIENT)
Dept: FAMILY MEDICINE | Facility: CLINIC | Age: 63
End: 2023-11-27
Payer: MEDICARE

## 2023-11-27 DIAGNOSIS — I10 HYPERTENSION, UNSPECIFIED TYPE: ICD-10-CM

## 2023-11-27 DIAGNOSIS — F32.A ANXIETY AND DEPRESSION: ICD-10-CM

## 2023-11-27 DIAGNOSIS — F41.9 ANXIETY AND DEPRESSION: ICD-10-CM

## 2023-11-27 DIAGNOSIS — E78.5 HYPERLIPIDEMIA, UNSPECIFIED HYPERLIPIDEMIA TYPE: ICD-10-CM

## 2023-11-27 RX ORDER — LISINOPRIL 10 MG/1
10 TABLET ORAL DAILY
Qty: 90 TABLET | Refills: 1 | Status: SHIPPED | OUTPATIENT
Start: 2023-11-27 | End: 2024-02-12

## 2023-11-27 RX ORDER — PANTOPRAZOLE SODIUM 40 MG/1
40 TABLET, DELAYED RELEASE ORAL DAILY
Qty: 90 TABLET | Refills: 0 | Status: SHIPPED | OUTPATIENT
Start: 2023-11-27 | End: 2024-02-12

## 2023-11-27 RX ORDER — HYDROXYZINE PAMOATE 25 MG/1
25 CAPSULE ORAL 3 TIMES DAILY
Qty: 90 CAPSULE | Refills: 2 | Status: SHIPPED | OUTPATIENT
Start: 2023-11-27

## 2023-11-27 RX ORDER — PAROXETINE HYDROCHLORIDE 40 MG/1
40 TABLET, FILM COATED ORAL DAILY
Qty: 90 TABLET | Refills: 0 | Status: SHIPPED | OUTPATIENT
Start: 2023-11-27

## 2023-11-27 RX ORDER — EZETIMIBE 10 MG/1
10 TABLET ORAL NIGHTLY
Qty: 90 TABLET | Refills: 1 | Status: SHIPPED | OUTPATIENT
Start: 2023-11-27 | End: 2024-02-12

## 2023-11-27 RX ORDER — METOPROLOL SUCCINATE 25 MG/1
25 TABLET, EXTENDED RELEASE ORAL DAILY
Qty: 90 TABLET | Refills: 1 | Status: SHIPPED | OUTPATIENT
Start: 2023-11-27 | End: 2024-02-28

## 2023-11-27 NOTE — TELEPHONE ENCOUNTER
----- Message from Myrna Felder MA sent at 11/20/2023  9:07 AM CST -----  Regarding: Pharmacy change  Good morning Matilda. This patient switched pharmacies. Can you please send all her scripts to the new pharmacy on file. Thank you!

## 2024-01-16 DIAGNOSIS — G62.9 NEUROPATHY: ICD-10-CM

## 2024-01-17 RX ORDER — GABAPENTIN 800 MG/1
800 TABLET ORAL 3 TIMES DAILY
Qty: 90 TABLET | Refills: 0 | Status: SHIPPED | OUTPATIENT
Start: 2024-01-17 | End: 2024-06-12

## 2024-02-10 DIAGNOSIS — I10 HYPERTENSION, UNSPECIFIED TYPE: ICD-10-CM

## 2024-02-10 DIAGNOSIS — E78.5 HYPERLIPIDEMIA, UNSPECIFIED HYPERLIPIDEMIA TYPE: ICD-10-CM

## 2024-02-12 RX ORDER — LISINOPRIL 10 MG/1
10 TABLET ORAL
Qty: 90 TABLET | Refills: 1 | Status: SHIPPED | OUTPATIENT
Start: 2024-02-12

## 2024-02-12 RX ORDER — EZETIMIBE 10 MG/1
10 TABLET ORAL NIGHTLY
Qty: 90 TABLET | Refills: 1 | Status: SHIPPED | OUTPATIENT
Start: 2024-02-12

## 2024-02-12 RX ORDER — PANTOPRAZOLE SODIUM 40 MG/1
40 TABLET, DELAYED RELEASE ORAL
Qty: 90 TABLET | Refills: 0 | Status: SHIPPED | OUTPATIENT
Start: 2024-02-12 | End: 2024-05-14 | Stop reason: SDUPTHER

## 2024-02-15 DIAGNOSIS — G62.9 NEUROPATHY: ICD-10-CM

## 2024-02-16 RX ORDER — GABAPENTIN 100 MG/1
CAPSULE ORAL
Qty: 90 CAPSULE | Refills: 2 | Status: ON HOLD | OUTPATIENT
Start: 2024-02-16 | End: 2024-05-01 | Stop reason: HOSPADM

## 2024-02-27 DIAGNOSIS — I10 HYPERTENSION, UNSPECIFIED TYPE: ICD-10-CM

## 2024-02-28 RX ORDER — METOPROLOL SUCCINATE 25 MG/1
25 TABLET, EXTENDED RELEASE ORAL
Qty: 90 TABLET | Refills: 0 | Status: SHIPPED | OUTPATIENT
Start: 2024-02-28 | End: 2024-05-14 | Stop reason: SDUPTHER

## 2024-04-25 ENCOUNTER — HOSPITAL ENCOUNTER (INPATIENT)
Facility: HOSPITAL | Age: 64
LOS: 7 days | Discharge: HOME | DRG: 193 | End: 2024-05-03
Attending: EMERGENCY MEDICINE | Admitting: STUDENT IN AN ORGANIZED HEALTH CARE EDUCATION/TRAINING PROGRAM
Payer: MEDICARE

## 2024-04-25 DIAGNOSIS — R07.9 CHEST PAIN: ICD-10-CM

## 2024-04-25 DIAGNOSIS — J20.8 ACUTE BACTERIAL BRONCHITIS: ICD-10-CM

## 2024-04-25 DIAGNOSIS — J96.01 ACUTE RESPIRATORY FAILURE WITH HYPOXIA: ICD-10-CM

## 2024-04-25 DIAGNOSIS — J44.1 ACUTE EXACERBATION OF COPD WITH ASTHMA: Primary | ICD-10-CM

## 2024-04-25 DIAGNOSIS — B96.89 ACUTE BACTERIAL BRONCHITIS: ICD-10-CM

## 2024-04-25 DIAGNOSIS — R06.02 SOB (SHORTNESS OF BREATH): ICD-10-CM

## 2024-04-25 DIAGNOSIS — R00.0 TACHYCARDIA: ICD-10-CM

## 2024-04-25 DIAGNOSIS — J45.901 ACUTE EXACERBATION OF COPD WITH ASTHMA: Primary | ICD-10-CM

## 2024-04-25 LAB
ALBUMIN SERPL-MCNC: 3.9 G/DL (ref 3.4–4.8)
ALBUMIN/GLOB SERPL: 1.1 RATIO (ref 1.1–2)
ALP SERPL-CCNC: 114 UNIT/L (ref 40–150)
ALT SERPL-CCNC: 41 UNIT/L (ref 0–55)
AST SERPL-CCNC: 45 UNIT/L (ref 5–34)
BASOPHILS # BLD AUTO: 0.03 X10(3)/MCL
BASOPHILS NFR BLD AUTO: 0.3 %
BILIRUB SERPL-MCNC: 0.4 MG/DL
BNP BLD-MCNC: 460.1 PG/ML
BUN SERPL-MCNC: 17.4 MG/DL (ref 9.8–20.1)
CALCIUM SERPL-MCNC: 8.9 MG/DL (ref 8.4–10.2)
CHLORIDE SERPL-SCNC: 107 MMOL/L (ref 98–107)
CO2 SERPL-SCNC: 22 MMOL/L (ref 23–31)
CREAT SERPL-MCNC: 1.65 MG/DL (ref 0.55–1.02)
EOSINOPHIL # BLD AUTO: 0.26 X10(3)/MCL (ref 0–0.9)
EOSINOPHIL NFR BLD AUTO: 2.8 %
ERYTHROCYTE [DISTWIDTH] IN BLOOD BY AUTOMATED COUNT: 13 % (ref 11.5–17)
GFR SERPLBLD CREATININE-BSD FMLA CKD-EPI: 34 MLS/MIN/1.73/M2
GLOBULIN SER-MCNC: 3.7 GM/DL (ref 2.4–3.5)
GLUCOSE SERPL-MCNC: 160 MG/DL (ref 82–115)
HCT VFR BLD AUTO: 39.7 % (ref 37–47)
HGB BLD-MCNC: 13.5 G/DL (ref 12–16)
IMM GRANULOCYTES # BLD AUTO: 0.03 X10(3)/MCL (ref 0–0.04)
IMM GRANULOCYTES NFR BLD AUTO: 0.3 %
LYMPHOCYTES # BLD AUTO: 4.13 X10(3)/MCL (ref 0.6–4.6)
LYMPHOCYTES NFR BLD AUTO: 43.8 %
MCH RBC QN AUTO: 32.8 PG (ref 27–31)
MCHC RBC AUTO-ENTMCNC: 34 G/DL (ref 33–36)
MCV RBC AUTO: 96.4 FL (ref 80–94)
MONOCYTES # BLD AUTO: 0.69 X10(3)/MCL (ref 0.1–1.3)
MONOCYTES NFR BLD AUTO: 7.3 %
NEUTROPHILS # BLD AUTO: 4.29 X10(3)/MCL (ref 2.1–9.2)
NEUTROPHILS NFR BLD AUTO: 45.5 %
NRBC BLD AUTO-RTO: 0 %
PLATELET # BLD AUTO: 312 X10(3)/MCL (ref 130–400)
PMV BLD AUTO: 9.5 FL (ref 7.4–10.4)
POTASSIUM SERPL-SCNC: 4.1 MMOL/L (ref 3.5–5.1)
PROT SERPL-MCNC: 7.6 GM/DL (ref 5.8–7.6)
RBC # BLD AUTO: 4.12 X10(6)/MCL (ref 4.2–5.4)
SODIUM SERPL-SCNC: 142 MMOL/L (ref 136–145)
WBC # SPEC AUTO: 9.43 X10(3)/MCL (ref 4.5–11.5)

## 2024-04-25 PROCEDURE — 99900031 HC PATIENT EDUCATION (STAT)

## 2024-04-25 PROCEDURE — 80053 COMPREHEN METABOLIC PANEL: CPT | Performed by: EMERGENCY MEDICINE

## 2024-04-25 PROCEDURE — 94760 N-INVAS EAR/PLS OXIMETRY 1: CPT

## 2024-04-25 PROCEDURE — 83880 ASSAY OF NATRIURETIC PEPTIDE: CPT | Performed by: EMERGENCY MEDICINE

## 2024-04-25 PROCEDURE — 93005 ELECTROCARDIOGRAM TRACING: CPT

## 2024-04-25 PROCEDURE — 25000242 PHARM REV CODE 250 ALT 637 W/ HCPCS: Performed by: EMERGENCY MEDICINE

## 2024-04-25 PROCEDURE — 84484 ASSAY OF TROPONIN QUANT: CPT | Performed by: EMERGENCY MEDICINE

## 2024-04-25 PROCEDURE — 85025 COMPLETE CBC W/AUTO DIFF WBC: CPT | Performed by: EMERGENCY MEDICINE

## 2024-04-25 PROCEDURE — 99900035 HC TECH TIME PER 15 MIN (STAT)

## 2024-04-25 PROCEDURE — 27000221 HC OXYGEN, UP TO 24 HOURS

## 2024-04-25 PROCEDURE — 94644 CONT INHLJ TX 1ST HOUR: CPT

## 2024-04-25 PROCEDURE — 96375 TX/PRO/DX INJ NEW DRUG ADDON: CPT

## 2024-04-25 PROCEDURE — 63600175 PHARM REV CODE 636 W HCPCS: Performed by: EMERGENCY MEDICINE

## 2024-04-25 PROCEDURE — 0240U COVID/FLU A&B PCR: CPT | Performed by: EMERGENCY MEDICINE

## 2024-04-25 PROCEDURE — 99285 EMERGENCY DEPT VISIT HI MDM: CPT | Mod: 25

## 2024-04-25 RX ORDER — IPRATROPIUM BROMIDE AND ALBUTEROL SULFATE 2.5; .5 MG/3ML; MG/3ML
3 SOLUTION RESPIRATORY (INHALATION)
Status: COMPLETED | OUTPATIENT
Start: 2024-04-26 | End: 2024-04-26

## 2024-04-25 RX ORDER — METHYLPREDNISOLONE SOD SUCC 125 MG
125 VIAL (EA) INJECTION
Status: COMPLETED | OUTPATIENT
Start: 2024-04-25 | End: 2024-04-25

## 2024-04-25 RX ORDER — ALBUTEROL SULFATE 0.83 MG/ML
7.5 SOLUTION RESPIRATORY (INHALATION)
Status: COMPLETED | OUTPATIENT
Start: 2024-04-25 | End: 2024-04-25

## 2024-04-25 RX ORDER — IPRATROPIUM BROMIDE 0.5 MG/2.5ML
0.5 SOLUTION RESPIRATORY (INHALATION) ONCE
Status: COMPLETED | OUTPATIENT
Start: 2024-04-25 | End: 2024-04-25

## 2024-04-25 RX ADMIN — IPRATROPIUM BROMIDE 0.5 MG: 0.5 SOLUTION RESPIRATORY (INHALATION) at 10:04

## 2024-04-25 RX ADMIN — ALBUTEROL SULFATE 7.5 MG: 2.5 SOLUTION RESPIRATORY (INHALATION) at 10:04

## 2024-04-25 RX ADMIN — METHYLPREDNISOLONE SODIUM SUCCINATE 125 MG: 125 INJECTION, POWDER, FOR SOLUTION INTRAMUSCULAR; INTRAVENOUS at 10:04

## 2024-04-25 NOTE — Clinical Note
Diagnosis: SOB (shortness of breath) [[id number]]   Future Attending Provider: SHERRIE JACKSON [[id number]]   Admit to which facility:: [facility] [[id number]]

## 2024-04-26 LAB
ALBUMIN SERPL-MCNC: 3.4 G/DL (ref 3.4–4.8)
ALBUMIN/GLOB SERPL: 1.1 RATIO (ref 1.1–2)
ALP SERPL-CCNC: 88 UNIT/L (ref 40–150)
ALT SERPL-CCNC: 42 UNIT/L (ref 0–55)
AST SERPL-CCNC: 40 UNIT/L (ref 5–34)
BASOPHILS # BLD AUTO: 0.02 X10(3)/MCL
BASOPHILS NFR BLD AUTO: 0.2 %
BILIRUB SERPL-MCNC: 0.3 MG/DL
BUN SERPL-MCNC: 20.1 MG/DL (ref 9.8–20.1)
CALCIUM SERPL-MCNC: 8.5 MG/DL (ref 8.4–10.2)
CHLORIDE SERPL-SCNC: 109 MMOL/L (ref 98–107)
CO2 SERPL-SCNC: 19 MMOL/L (ref 23–31)
CREAT SERPL-MCNC: 1.44 MG/DL (ref 0.55–1.02)
EOSINOPHIL # BLD AUTO: 0.01 X10(3)/MCL (ref 0–0.9)
EOSINOPHIL NFR BLD AUTO: 0.1 %
ERYTHROCYTE [DISTWIDTH] IN BLOOD BY AUTOMATED COUNT: 13.1 % (ref 11.5–17)
EST. AVERAGE GLUCOSE BLD GHB EST-MCNC: 102.5 MG/DL
FLUAV AG UPPER RESP QL IA.RAPID: NOT DETECTED
FLUBV AG UPPER RESP QL IA.RAPID: NOT DETECTED
GFR SERPLBLD CREATININE-BSD FMLA CKD-EPI: 40 MLS/MIN/1.73/M2
GLOBULIN SER-MCNC: 3.1 GM/DL (ref 2.4–3.5)
GLUCOSE SERPL-MCNC: 220 MG/DL (ref 82–115)
HBA1C MFR BLD: 5.2 %
HCT VFR BLD AUTO: 33 % (ref 37–47)
HGB BLD-MCNC: 11.1 G/DL (ref 12–16)
IMM GRANULOCYTES # BLD AUTO: 0.04 X10(3)/MCL (ref 0–0.04)
IMM GRANULOCYTES NFR BLD AUTO: 0.4 %
LACTATE SERPL-SCNC: 1.7 MMOL/L (ref 0.5–2.2)
LACTATE SERPL-SCNC: 2.2 MMOL/L (ref 0.5–2.2)
LACTATE SERPL-SCNC: 2.5 MMOL/L (ref 0.5–2.2)
LACTATE SERPL-SCNC: 2.7 MMOL/L (ref 0.5–2.2)
LYMPHOCYTES # BLD AUTO: 0.73 X10(3)/MCL (ref 0.6–4.6)
LYMPHOCYTES NFR BLD AUTO: 7.9 %
MAGNESIUM SERPL-MCNC: 1.6 MG/DL (ref 1.6–2.6)
MCH RBC QN AUTO: 33.1 PG (ref 27–31)
MCHC RBC AUTO-ENTMCNC: 33.6 G/DL (ref 33–36)
MCV RBC AUTO: 98.5 FL (ref 80–94)
MONOCYTES # BLD AUTO: 0.18 X10(3)/MCL (ref 0.1–1.3)
MONOCYTES NFR BLD AUTO: 2 %
NEUTROPHILS # BLD AUTO: 8.21 X10(3)/MCL (ref 2.1–9.2)
NEUTROPHILS NFR BLD AUTO: 89.4 %
NRBC BLD AUTO-RTO: 0 %
OHS QRS DURATION: 84 MS
OHS QRS DURATION: 88 MS
OHS QTC CALCULATION: 468 MS
OHS QTC CALCULATION: 499 MS
PHOSPHATE SERPL-MCNC: 2.2 MG/DL (ref 2.3–4.7)
PLATELET # BLD AUTO: 209 X10(3)/MCL (ref 130–400)
PMV BLD AUTO: 9.4 FL (ref 7.4–10.4)
POTASSIUM SERPL-SCNC: 3.9 MMOL/L (ref 3.5–5.1)
PROT SERPL-MCNC: 6.5 GM/DL (ref 5.8–7.6)
RBC # BLD AUTO: 3.35 X10(6)/MCL (ref 4.2–5.4)
SARS-COV-2 RNA RESP QL NAA+PROBE: NOT DETECTED
SODIUM SERPL-SCNC: 139 MMOL/L (ref 136–145)
TROPONIN I SERPL-MCNC: 0.01 NG/ML (ref 0–0.04)
WBC # SPEC AUTO: 9.19 X10(3)/MCL (ref 4.5–11.5)

## 2024-04-26 PROCEDURE — 99900035 HC TECH TIME PER 15 MIN (STAT)

## 2024-04-26 PROCEDURE — 36415 COLL VENOUS BLD VENIPUNCTURE: CPT | Performed by: NURSE PRACTITIONER

## 2024-04-26 PROCEDURE — 83605 ASSAY OF LACTIC ACID: CPT | Mod: 91 | Performed by: EMERGENCY MEDICINE

## 2024-04-26 PROCEDURE — 99900031 HC PATIENT EDUCATION (STAT)

## 2024-04-26 PROCEDURE — 63600175 PHARM REV CODE 636 W HCPCS: Performed by: NURSE PRACTITIONER

## 2024-04-26 PROCEDURE — 80053 COMPREHEN METABOLIC PANEL: CPT | Performed by: NURSE PRACTITIONER

## 2024-04-26 PROCEDURE — 63600175 PHARM REV CODE 636 W HCPCS: Performed by: PHYSICIAN ASSISTANT

## 2024-04-26 PROCEDURE — 27000221 HC OXYGEN, UP TO 24 HOURS

## 2024-04-26 PROCEDURE — 94760 N-INVAS EAR/PLS OXIMETRY 1: CPT

## 2024-04-26 PROCEDURE — 87040 BLOOD CULTURE FOR BACTERIA: CPT | Performed by: EMERGENCY MEDICINE

## 2024-04-26 PROCEDURE — 87077 CULTURE AEROBIC IDENTIFY: CPT | Performed by: EMERGENCY MEDICINE

## 2024-04-26 PROCEDURE — 85025 COMPLETE CBC W/AUTO DIFF WBC: CPT | Performed by: NURSE PRACTITIONER

## 2024-04-26 PROCEDURE — 83735 ASSAY OF MAGNESIUM: CPT | Performed by: NURSE PRACTITIONER

## 2024-04-26 PROCEDURE — 63600175 PHARM REV CODE 636 W HCPCS: Performed by: EMERGENCY MEDICINE

## 2024-04-26 PROCEDURE — 25000003 PHARM REV CODE 250: Performed by: PHYSICIAN ASSISTANT

## 2024-04-26 PROCEDURE — 25000003 PHARM REV CODE 250: Performed by: NURSE PRACTITIONER

## 2024-04-26 PROCEDURE — 96365 THER/PROPH/DIAG IV INF INIT: CPT

## 2024-04-26 PROCEDURE — 84100 ASSAY OF PHOSPHORUS: CPT | Performed by: NURSE PRACTITIONER

## 2024-04-26 PROCEDURE — 94761 N-INVAS EAR/PLS OXIMETRY MLT: CPT

## 2024-04-26 PROCEDURE — 94640 AIRWAY INHALATION TREATMENT: CPT

## 2024-04-26 PROCEDURE — 83605 ASSAY OF LACTIC ACID: CPT | Performed by: PHYSICIAN ASSISTANT

## 2024-04-26 PROCEDURE — 11000001 HC ACUTE MED/SURG PRIVATE ROOM

## 2024-04-26 PROCEDURE — 25000242 PHARM REV CODE 250 ALT 637 W/ HCPCS: Performed by: EMERGENCY MEDICINE

## 2024-04-26 PROCEDURE — 25000003 PHARM REV CODE 250: Performed by: EMERGENCY MEDICINE

## 2024-04-26 PROCEDURE — 21400001 HC TELEMETRY ROOM

## 2024-04-26 PROCEDURE — 25000003 PHARM REV CODE 250: Performed by: STUDENT IN AN ORGANIZED HEALTH CARE EDUCATION/TRAINING PROGRAM

## 2024-04-26 PROCEDURE — 83036 HEMOGLOBIN GLYCOSYLATED A1C: CPT | Performed by: PHYSICIAN ASSISTANT

## 2024-04-26 PROCEDURE — 25000242 PHARM REV CODE 250 ALT 637 W/ HCPCS: Performed by: STUDENT IN AN ORGANIZED HEALTH CARE EDUCATION/TRAINING PROGRAM

## 2024-04-26 PROCEDURE — 87154 CUL TYP ID BLD PTHGN 6+ TRGT: CPT | Performed by: EMERGENCY MEDICINE

## 2024-04-26 PROCEDURE — 36415 COLL VENOUS BLD VENIPUNCTURE: CPT | Performed by: PHYSICIAN ASSISTANT

## 2024-04-26 RX ORDER — TALC
6 POWDER (GRAM) TOPICAL NIGHTLY PRN
Status: DISCONTINUED | OUTPATIENT
Start: 2024-04-26 | End: 2024-05-03 | Stop reason: HOSPADM

## 2024-04-26 RX ORDER — IPRATROPIUM BROMIDE AND ALBUTEROL SULFATE 2.5; .5 MG/3ML; MG/3ML
3 SOLUTION RESPIRATORY (INHALATION) EVERY 4 HOURS PRN
Status: DISCONTINUED | OUTPATIENT
Start: 2024-04-26 | End: 2024-05-03 | Stop reason: HOSPADM

## 2024-04-26 RX ORDER — NALOXONE HCL 0.4 MG/ML
0.02 VIAL (ML) INJECTION
Status: DISCONTINUED | OUTPATIENT
Start: 2024-04-26 | End: 2024-05-03 | Stop reason: HOSPADM

## 2024-04-26 RX ORDER — ALUMINUM HYDROXIDE, MAGNESIUM HYDROXIDE, AND SIMETHICONE 1200; 120; 1200 MG/30ML; MG/30ML; MG/30ML
30 SUSPENSION ORAL 4 TIMES DAILY PRN
Status: DISCONTINUED | OUTPATIENT
Start: 2024-04-26 | End: 2024-05-03 | Stop reason: HOSPADM

## 2024-04-26 RX ORDER — HYDROXYZINE PAMOATE 50 MG/1
50 CAPSULE ORAL NIGHTLY
COMMUNITY
Start: 2022-02-22 | End: 2024-06-12

## 2024-04-26 RX ORDER — ONDANSETRON HYDROCHLORIDE 2 MG/ML
4 INJECTION, SOLUTION INTRAVENOUS EVERY 4 HOURS PRN
Status: DISCONTINUED | OUTPATIENT
Start: 2024-04-26 | End: 2024-05-03 | Stop reason: HOSPADM

## 2024-04-26 RX ORDER — SODIUM CHLORIDE, SODIUM LACTATE, POTASSIUM CHLORIDE, CALCIUM CHLORIDE 600; 310; 30; 20 MG/100ML; MG/100ML; MG/100ML; MG/100ML
INJECTION, SOLUTION INTRAVENOUS CONTINUOUS
Status: DISCONTINUED | OUTPATIENT
Start: 2024-04-26 | End: 2024-04-28

## 2024-04-26 RX ORDER — BUDESONIDE 0.5 MG/2ML
0.25 INHALANT ORAL DAILY PRN
Status: DISCONTINUED | OUTPATIENT
Start: 2024-04-26 | End: 2024-05-03 | Stop reason: HOSPADM

## 2024-04-26 RX ORDER — METOPROLOL SUCCINATE 25 MG/1
25 TABLET, EXTENDED RELEASE ORAL DAILY
Status: DISCONTINUED | OUTPATIENT
Start: 2024-04-27 | End: 2024-05-03 | Stop reason: HOSPADM

## 2024-04-26 RX ORDER — IPRATROPIUM BROMIDE AND ALBUTEROL SULFATE 2.5; .5 MG/3ML; MG/3ML
3 SOLUTION RESPIRATORY (INHALATION) EVERY 12 HOURS
Status: DISCONTINUED | OUTPATIENT
Start: 2024-04-26 | End: 2024-04-28

## 2024-04-26 RX ORDER — ACETAMINOPHEN 325 MG/1
650 TABLET ORAL EVERY 6 HOURS PRN
Status: DISCONTINUED | OUTPATIENT
Start: 2024-04-26 | End: 2024-05-03 | Stop reason: HOSPADM

## 2024-04-26 RX ORDER — HYDROXYZINE PAMOATE 50 MG/1
50 CAPSULE ORAL NIGHTLY
Status: DISCONTINUED | OUTPATIENT
Start: 2024-04-26 | End: 2024-05-03 | Stop reason: HOSPADM

## 2024-04-26 RX ORDER — GUAIFENESIN 100 MG/5ML
200 SOLUTION ORAL EVERY 4 HOURS PRN
Status: DISCONTINUED | OUTPATIENT
Start: 2024-04-26 | End: 2024-04-29

## 2024-04-26 RX ORDER — PAROXETINE HYDROCHLORIDE 20 MG/1
40 TABLET, FILM COATED ORAL DAILY
Status: DISCONTINUED | OUTPATIENT
Start: 2024-04-27 | End: 2024-05-03 | Stop reason: HOSPADM

## 2024-04-26 RX ORDER — GABAPENTIN 100 MG/1
100 CAPSULE ORAL 3 TIMES DAILY
Status: DISCONTINUED | OUTPATIENT
Start: 2024-04-26 | End: 2024-04-27

## 2024-04-26 RX ORDER — IPRATROPIUM BROMIDE AND ALBUTEROL SULFATE 2.5; .5 MG/3ML; MG/3ML
3 SOLUTION RESPIRATORY (INHALATION) EVERY 6 HOURS PRN
COMMUNITY
Start: 2022-03-30

## 2024-04-26 RX ORDER — PROCHLORPERAZINE EDISYLATE 5 MG/ML
5 INJECTION INTRAMUSCULAR; INTRAVENOUS EVERY 6 HOURS PRN
Status: DISCONTINUED | OUTPATIENT
Start: 2024-04-26 | End: 2024-05-03 | Stop reason: HOSPADM

## 2024-04-26 RX ORDER — ENOXAPARIN SODIUM 100 MG/ML
30 INJECTION SUBCUTANEOUS EVERY 24 HOURS
Status: DISCONTINUED | OUTPATIENT
Start: 2024-04-26 | End: 2024-04-29

## 2024-04-26 RX ORDER — SIMETHICONE 80 MG
1 TABLET,CHEWABLE ORAL 4 TIMES DAILY PRN
Status: DISCONTINUED | OUTPATIENT
Start: 2024-04-26 | End: 2024-05-03 | Stop reason: HOSPADM

## 2024-04-26 RX ORDER — POLYETHYLENE GLYCOL 3350 17 G/17G
17 POWDER, FOR SOLUTION ORAL 2 TIMES DAILY PRN
Status: DISCONTINUED | OUTPATIENT
Start: 2024-04-26 | End: 2024-05-03 | Stop reason: HOSPADM

## 2024-04-26 RX ADMIN — AZITHROMYCIN MONOHYDRATE 500 MG: 500 INJECTION, POWDER, LYOPHILIZED, FOR SOLUTION INTRAVENOUS at 01:04

## 2024-04-26 RX ADMIN — SODIUM CHLORIDE, POTASSIUM CHLORIDE, SODIUM LACTATE AND CALCIUM CHLORIDE 1000 ML: 600; 310; 30; 20 INJECTION, SOLUTION INTRAVENOUS at 01:04

## 2024-04-26 RX ADMIN — IPRATROPIUM BROMIDE AND ALBUTEROL SULFATE 3 ML: .5; 3 SOLUTION RESPIRATORY (INHALATION) at 08:04

## 2024-04-26 RX ADMIN — IPRATROPIUM BROMIDE AND ALBUTEROL SULFATE 3 ML: .5; 3 SOLUTION RESPIRATORY (INHALATION) at 12:04

## 2024-04-26 RX ADMIN — METHYLPREDNISOLONE SODIUM SUCCINATE 40 MG: 40 INJECTION, POWDER, FOR SOLUTION INTRAMUSCULAR; INTRAVENOUS at 09:04

## 2024-04-26 RX ADMIN — SODIUM CHLORIDE, POTASSIUM CHLORIDE, SODIUM LACTATE AND CALCIUM CHLORIDE: 600; 310; 30; 20 INJECTION, SOLUTION INTRAVENOUS at 03:04

## 2024-04-26 RX ADMIN — GUAIFENESIN 200 MG: 200 SOLUTION ORAL at 09:04

## 2024-04-26 RX ADMIN — HYDROXYZINE PAMOATE 50 MG: 50 CAPSULE ORAL at 09:04

## 2024-04-26 RX ADMIN — GABAPENTIN 100 MG: 100 CAPSULE ORAL at 08:04

## 2024-04-26 RX ADMIN — ENOXAPARIN SODIUM 30 MG: 30 INJECTION SUBCUTANEOUS at 05:04

## 2024-04-26 RX ADMIN — CEFTRIAXONE SODIUM 1 G: 1 INJECTION, POWDER, FOR SOLUTION INTRAMUSCULAR; INTRAVENOUS at 09:04

## 2024-04-26 NOTE — ED PROVIDER NOTES
Encounter Date: 4/25/2024    SCRIBE #1 NOTE: I, Ruel Bettencourt, am scribing for, and in the presence of,  Orly Hunter MD. I have scribed the following portions of the note - Other sections scribed: HPI, ROS, PE.       History     Chief Complaint   Patient presents with    Shortness of Breath     Cough/congestion x 2 weeks, been using breathing txs at home. SOB got severe tonight. Room air 85% then dropped to 77% before coming back up to 90s with 15L NRB mask. Denies chest pain, cardiac hx. Since COVID a few years ago has had coughing spells     Pt is a 64 y.o. female with a pMHx of AML lukemia(15 years in remission), COPD, HLD, and HTN presenting to the ED complaining of shortness of breath onset 2 weeks ago. She has had SOB, a productive cough, and congestion over the past two weeks and her SOB worsened tonight. She has been using breathing treatments at home which have not been helping. She is not on O2 at home. She also report sleep disturbances due to her respiratory symptoms. She denies any fevers, chest pain, or appetite changes.    Pt states she is unsure is she has COPD, says she has never taken the breathing test.        Review of patient's allergies indicates:   Allergen Reactions    Lovastatin Dermatitis    Penicillins Shortness Of Breath    Sulfa (sulfonamide antibiotics) Rash     Past Medical History:   Diagnosis Date    AML (acute myeloid leukemia) in remission     COPD (chronic obstructive pulmonary disease)     Hyperlipidemia     Hypertension     Neuropathy      Past Surgical History:   Procedure Laterality Date    CHOLECYSTECTOMY      MEDIPORT INSERTION, SINGLE      MEDIPORT REMOVAL       Family History   Problem Relation Name Age of Onset    Diabetes Mother      Heart attack Father      Heart disease Sister      Diabetes Sister       Social History     Tobacco Use    Smoking status: Former     Types: Cigarettes, Vaping with nicotine    Smokeless tobacco: Never    Tobacco comments:     No onger  smoking cigarettes, but still vape.    Substance Use Topics    Alcohol use: Not Currently    Drug use: Never     Review of Systems   Constitutional:  Negative for appetite change and fever.   HENT:  Positive for congestion.    Respiratory:  Positive for cough (productive).         Chest congestion   Cardiovascular:  Negative for chest pain.   Psychiatric/Behavioral:  Positive for sleep disturbance.        Physical Exam     Initial Vitals [04/25/24 2227]   BP Pulse Resp Temp SpO2   (!) 136/93 85 (!) 33 97.9 °F (36.6 °C) 99 %      MAP       --         Physical Exam    Nursing note and vitals reviewed.  Constitutional: She appears well-developed and well-nourished. She is not diaphoretic. No distress.   HENT:   Head: Normocephalic and atraumatic.   Nose: Nose normal.   Mouth/Throat: Oropharynx is clear and moist.   Eyes: Conjunctivae and EOM are normal. Pupils are equal, round, and reactive to light.   Neck: Trachea normal. Neck supple.   Normal range of motion.  Cardiovascular:  Normal rate, regular rhythm, normal heart sounds and intact distal pulses.           No murmur heard.  Pulmonary/Chest: Tachypnea noted. No respiratory distress. She has wheezes (diffuse). She has no rhonchi. She has no rales. She exhibits no tenderness.   Short of breath, speaking in few word sentences.  Prolonged expiratory phase.   Abdominal: Abdomen is soft. Bowel sounds are normal. She exhibits no distension and no mass. There is no abdominal tenderness. There is no rebound and no guarding.   Musculoskeletal:         General: No tenderness or edema. Normal range of motion.      Cervical back: Normal range of motion and neck supple.      Lumbar back: Normal. Normal range of motion.     Neurological: She is alert and oriented to person, place, and time. She has normal strength. No cranial nerve deficit or sensory deficit.   Skin: Skin is warm and dry. Capillary refill takes less than 2 seconds. No abscess noted. No erythema. No pallor.    Psychiatric: She has a normal mood and affect. Her behavior is normal. Judgment and thought content normal.         ED Course   Critical Care    Date/Time: 4/26/2024 1:54 AM    Performed by: Orly Hunter MD  Authorized by: Orly Hunter MD  Direct patient critical care time: 65 minutes  Total critical care time (exclusive of procedural time) : 65 minutes  Critical care was necessary to treat or prevent imminent or life-threatening deterioration of the following conditions: respiratory failure.  Critical care was time spent personally by me on the following activities: development of treatment plan with patient or surrogate, discussions with consultants, evaluation of patient's response to treatment, examination of patient, obtaining history from patient or surrogate, ordering and performing treatments and interventions, pulse oximetry, re-evaluation of patient's condition, ordering and review of radiographic studies, ordering and review of laboratory studies and review of old charts.        Labs Reviewed   COMPREHENSIVE METABOLIC PANEL - Abnormal; Notable for the following components:       Result Value    Carbon Dioxide 22 (*)     Glucose Level 160 (*)     Creatinine 1.65 (*)     Globulin 3.7 (*)     Aspartate Aminotransferase 45 (*)     All other components within normal limits   B-TYPE NATRIURETIC PEPTIDE - Abnormal; Notable for the following components:    Natriuretic Peptide 460.1 (*)     All other components within normal limits   CBC WITH DIFFERENTIAL - Abnormal; Notable for the following components:    RBC 4.12 (*)     MCV 96.4 (*)     MCH 32.8 (*)     All other components within normal limits   LACTIC ACID, PLASMA - Abnormal; Notable for the following components:    Lactic Acid Level 2.5 (*)     All other components within normal limits   LACTIC ACID, PLASMA - Abnormal; Notable for the following components:    Lactic Acid Level 2.7 (*)     All other components within normal limits   CBC WITH  DIFFERENTIAL - Abnormal; Notable for the following components:    RBC 3.35 (*)     Hgb 11.1 (*)     Hct 33.0 (*)     MCV 98.5 (*)     MCH 33.1 (*)     All other components within normal limits   COVID/FLU A&B PCR - Normal    Narrative:     The Xpert Xpress SARS-CoV-2/FLU/RSV plus is a rapid, multiplexed real-time PCR test intended for the simultaneous qualitative detection and differentiation of SARS-CoV-2, Influenza A, Influenza B, and respiratory syncytial virus (RSV) viral RNA in either nasopharyngeal swab or nasal swab specimens.         TROPONIN I - Normal   BLOOD CULTURE [facility]   BLOOD CULTURE [facility]   CBC W/ AUTO DIFFERENTIAL    Narrative:     The following orders were created for panel order CBC auto differential.  Procedure                               Abnormality         Status                     ---------                               -----------         ------                     CBC with Differential[[id number]]       Abnormal            Final result                 Please view results for these tests on the individual orders.   CBC W/ AUTO DIFFERENTIAL    Narrative:     The following orders were created for panel order CBC with Automated Differential.  Procedure                               Abnormality         Status                     ---------                               -----------         ------                     CBC with Differential[[id number]]       Abnormal            Final result                 Please view results for these tests on the individual orders.   COMPREHENSIVE METABOLIC PANEL   MAGNESIUM   PHOSPHORUS          Imaging Results              X-Ray Chest AP Portable (In process)                   X-Rays:   Independently Interpreted Readings:   Chest X-Ray: ?interstitial prominence in left base     Medications   melatonin tablet 6 mg (has no administration in time range)   ondansetron injection 4 mg (has no administration in time range)   prochlorperazine injection Soln 5 mg (has no  administration in time range)   polyethylene glycol packet 17 g (has no administration in time range)   acetaminophen tablet 650 mg (has no administration in time range)   simethicone chewable tablet 80 mg (has no administration in time range)   aluminum-magnesium hydroxide-simethicone 200-200-20 mg/5 mL suspension 30 mL (has no administration in time range)   naloxone 0.4 mg/mL injection 0.02 mg (has no administration in time range)   enoxaparin injection 30 mg (has no administration in time range)   albuterol-ipratropium 2.5 mg-0.5 mg/3 mL nebulizer solution 3 mL (has no administration in time range)   lactated ringers infusion ( Intravenous New Bag 4/26/24 0336)   azithromycin 500 mg in dextrose 5 % 250 mL IVPB (ready to mix) (has no administration in time range)   methylPREDNISolone sodium succinate injection 40 mg (has no administration in time range)   albuterol nebulizer solution 7.5 mg (7.5 mg Nebulization Given 4/25/24 2258)   ipratropium 0.02 % nebulizer solution 0.5 mg (0.5 mg Nebulization Given 4/25/24 2258)   methylPREDNISolone sodium succinate injection 125 mg (125 mg Intravenous Given 4/25/24 2245)   albuterol-ipratropium 2.5 mg-0.5 mg/3 mL nebulizer solution 3 mL (3 mLs Nebulization Given 4/26/24 0001)   azithromycin 500 mg in dextrose 5 % 250 mL IVPB (ready to mix) (0 mg Intravenous Stopped 4/26/24 0204)   lactated ringers bolus 1,000 mL (0 mLs Intravenous Stopped 4/26/24 0204)     Medical Decision Making  The list of differential diagnoses includes, but is not limited to, PNA, bronchitis, COPD, anemia, renal failure.  Cbc, cmp, trop, bnp, flu/covid, EKG, cxr ordered and reviewed  Given Iv high-dose steroids, intensification and DuoNeb with continued wheezing and shortness of breath and oxygen requirement.  Chest x-ray with questionable infiltrate versus some interstitial prominence.  Regardless she was having increased sputum production and change in cough therefore given azithromycin.  She was  given IV fluids.  Her labs were grossly unremarkable and she was admitted to hospitalist    Problems Addressed:  Acute bacterial bronchitis: acute illness or injury that poses a threat to life or bodily functions  Acute exacerbation of COPD with asthma: acute illness or injury that poses a threat to life or bodily functions  Acute respiratory failure with hypoxia: acute illness or injury that poses a threat to life or bodily functions    Amount and/or Complexity of Data Reviewed  External Data Reviewed: notes.     Details: Urgent care visit for COPD exacerbation  Labs: ordered.  Radiology: ordered and independent interpretation performed.  ECG/medicine tests: ordered and independent interpretation performed.    Risk  OTC drugs.  Prescription drug management.  Decision regarding hospitalization.    Critical Care  Total time providing critical care: 65 minutes            Scribe Attestation:   Scribe #1: I performed the above scribed service and the documentation accurately describes the services I performed. I attest to the accuracy of the note.  Comments: Attending:   Physician Attestation Statement for Scribe #1: Orly CHRISTINE MD, personally performed the services described in this documentation. All medical record entries made by the scribe were at my direction and in my presence.  I have reviewed the chart and agree that the record reflects my personal performance and is accurate and complete.        Attending Attestation:           Physician Attestation for Scribe:  Physician Attestation Statement for Scribe #1: Elsa CHRISTINE Brooke R, MD, reviewed documentation, as scribed by Ruel Bettencourt in my presence, and it is both accurate and complete.             ED Course as of 04/26/24 0353   u Apr 25, 2024   2241 EKG obtained at 10:27 p.m. rate of 72 sinus rhythm with significant baseline artifact due to motion limiting interpretation but no gross underlying ST elevation or depression [BS]   2343 Improved,  continued hypoxia, wheezing [BS]   Fri Apr 26, 2024   0043 Second neb helped some, still requiring o2 [BS]   0114 Hospitalist consulted [BS]      ED Course User Index  [BS] Orly Hunter MD                             Clinical Impression:  Final diagnoses:  [J44.1, J45.901] Acute exacerbation of COPD with asthma (Primary)  [J96.01] Acute respiratory failure with hypoxia  [J20.8, B96.89] Acute bacterial bronchitis          ED Disposition Condition    Observation Stable                Orly Hunter MD  04/26/24 0357

## 2024-04-26 NOTE — H&P
[facility] Medicine History & Physical Examination       Patient Name: Allyssa Sood  MRN: [id number]  Patient Class: OP- Observation   Admission Date: 4/25/2024   Admitting Physician: Cory Mancilla MD   Length of Stay: 0  Attending Physician: Noris Horton DO  Primary Care Provider: Katie Mcdaniel MD  Face-to-Face encounter date: 04/26/2024  Code Status: Full Code  Chief Complaint: Shortness of Breath (Cough/congestion x 2 weeks, been using breathing txs at home. SOB got severe tonight. Room air 85% then dropped to 77% before coming back up to 90s with 15L NRB mask. Denies chest pain, cardiac hx. Since COVID a few years ago has had coughing spells)        Patient information was obtained from patient, patient's family, past medical records and ER records.     HISTORY OF PRESENT ILLNESS:   Allyssa Sood is a 64 y.o. White female with a past medical history of hypertension, hyperlipidemia, COPD not on home oxygen and AML in remission for 15 years. The patient presented to [facility] on 4/25/2024 with a primary complaint of shortness of breath and a productive cough with white sputum.  Patient reports cough has been present for the last 2 weeks progressively worsening resulting in shortness of breath.  She was had 1-2 episodes of diarrhea since presentation to hospital.  She denies complaints of fever, chills, abdominal pain, nausea, vomiting and diarrhea.    Upon presentation to the ED, temperature 97.9° F, heart rate 85, blood pressure 136/93, respiratory rate 33 and SpO2 99% on 15 L non-rebreather.  Labs with WBC 9.34, BUN/creatinine 17.4/1.65 (19.7/1.00 in November 2022), glucose 160, , troponin 0.010, lactic acid 2.5.  Influenza a/B and SARS-COV-2 PCR negative.  EKG normal sinus rhythm with nonspecific ST abnormalities.  Chest x-ray with patchy right lung base opacity concerning for possible pneumonia.  In ED patient received DuoNebs, azithromycin,  Solu-Medrol, ipratropium and IV fluid hydration.  Patient was oxygen has been weaned down to 2 L nasal cannula.  She is admitted to hospital medicine services for further medical management.    PAST MEDICAL HISTORY:     Past Medical History:   Diagnosis Date    AML (acute myeloid leukemia) in remission     COPD (chronic obstructive pulmonary disease)     Hyperlipidemia     Hypertension     Neuropathy        PAST SURGICAL HISTORY:     Past Surgical History:   Procedure Laterality Date    CHOLECYSTECTOMY      MEDIPORT INSERTION, SINGLE      MEDIPORT REMOVAL         ALLERGIES:   Lovastatin, Penicillins, and Sulfa (sulfonamide antibiotics)    FAMILY HISTORY:   Mother: Esophageal cancer, diabetes mellitus   Father:  Myocardial infarction    SOCIAL HISTORY:   Former cigarette smoker who quit 7+ years ago but intermittently vapes   Denies alcohol or illicit drug use    HOME MEDICATIONS:     Prior to Admission medications    Medication Sig Start Date End Date Taking? Authorizing Provider   albuterol-ipratropium (DUO-NEB) 2.5 mg-0.5 mg/3 mL nebulizer solution Inhale 3 mLs into the lungs every 6 (six) hours as needed for Shortness of Breath or Wheezing. 3/30/22  Yes Provider, Historical   ezetimibe (ZETIA) 10 mg tablet TAKE ONE TABLET BY MOUTH EVERY EVENING 2/12/24  Yes Katie Mcdaniel MD   gabapentin (NEURONTIN) 100 MG capsule TAKE ONE CAPLET BY MOUTH THREE TIMES DAILY 2/16/24  Yes Katie Mcdaniel MD   gabapentin (NEURONTIN) 800 MG tablet TAKE ONE TABLET BY MOUTH THREE TIMES DAILY 1/17/24  Yes Matilda Jaquez NP   hydrOXYzine pamoate (VISTARIL) 25 MG Cap Take 1 capsule (25 mg total) by mouth 3 (three) times daily. 11/27/23  Yes Matilda Jaquez NP   hydrOXYzine pamoate (VISTARIL) 50 MG Cap Take 50 mg by mouth every evening. 2/22/22  Yes Provider, Historical   lisinopriL 10 MG tablet TAKE ONE TABLET BY MOUTH EVERY DAY 2/12/24  Yes Katie Mcdaniel MD   metoprolol succinate (TOPROL-XL) 25 MG 24 hr  tablet TAKE ONE TABLET BY MOUTH EVERY DAY 2/28/24  Yes Katie Mcdaniel MD   pantoprazole (PROTONIX) 40 MG tablet TAKE 1 TABLET BY MOUTH EVERY DAY 2/12/24  Yes Katie Mcdaniel MD   paroxetine (PAXIL) 40 MG tablet Take 1 tablet (40 mg total) by mouth once daily. 11/27/23  Yes Matilda Jaquez NP   albuterol (PROVENTIL/VENTOLIN HFA) 90 mcg/actuation inhaler  4/6/22   Provider, Nida   budesonide (PULMICORT) 0.25 mg/2 mL nebulizer solution Take 2 mLs (0.25 mg total) by nebulization daily as needed (Wheezing or shortness of breath). Controller 5/8/23 5/14/23  Favian Cohen MD       REVIEW OF SYSTEMS:   Except as documented, all other systems reviewed and negative     PHYSICAL EXAM:     VITAL SIGNS: 24 HRS MIN & MAX LAST   Temp  Min: 97.9 °F (36.6 °C)  Max: 98.4 °F (36.9 °C) 98.4 °F (36.9 °C)   BP  Min: 99/65  Max: 142/87 126/71   Pulse  Min: 68  Max: 93  71   Resp  Min: 17  Max: 33 18   SpO2  Min: 94 %  Max: 100 % 97 %       General appearance: Well-developed, well-nourished female in no apparent distress.  No family at bedside.  Sitting in bed eating.  HEENT: Atraumatic head. Moist mucous membranes of oral cavity.  Lungs:  Diminished to auscultation on right lower lung field otherwise clear.  On 3 L O2 via nasal cannula.  Heart: Regular rate and rhythm.  No edema bilateral lower extremities.  Abdomen: Soft, non-distended.  Extremities: No cyanosis, clubbing. No deformities.  Skin: No Rash. Warm and dry.  Neuro: Awake, alert and oriented. Motor and sensory exams grossly intact.  Psych/mental status: Appropriate mood and affect. Cooperative. Responds appropriately to questions.       LABS AND IMAGING:     Recent Labs   Lab 04/25/24  2259 04/26/24  0314   WBC 9.43 9.19   RBC 4.12* 3.35*   HGB 13.5 11.1*   HCT 39.7 33.0*   MCV 96.4* 98.5*   MCH 32.8* 33.1*   MCHC 34.0 33.6   RDW 13.0 13.1    209   MPV 9.5 9.4       Recent Labs   Lab 04/25/24  5111 04/26/24  0314    139   K 4.1 3.9   CO2  22* 19*   BUN 17.4 20.1   CREATININE 1.65* 1.44*   CALCIUM 8.9 8.5   MG  --  1.60   ALBUMIN 3.9 3.4   ALKPHOS 114 88   ALT 41 42   AST 45* 40*   BILITOT 0.4 0.3       Microbiology Results (last 7 days)       Procedure Component Value Units Date/Time    Blood culture #1 **CANNOT BE ORDERED STAT** [[id number]] Collected: 04/26/24 0106    Order Status: Resulted Specimen: Blood Updated: 04/26/24 0109    Blood culture #2 **CANNOT BE ORDERED STAT** [[id number]] Collected: 04/26/24 0107    Order Status: Resulted Specimen: Blood Updated: 04/26/24 0109             X-Ray Chest AP Portable  Narrative: EXAMINATION:  XR CHEST AP PORTABLE    CLINICAL HISTORY:  Shortness of breath, cough.    TECHNIQUE:  Single frontal view of the chest was performed.    COMPARISON:  PA and lateral chest radiograph, 05/07/2023.    FINDINGS:  Patchy right lung base opacities.  No pleural effusion.  No pneumothorax.    Cardiomediastinal silhouette is within normal limits for size.  Aortic calcifications are present.    No acute osseous abnormality.  Impression: Patchy right lung base opacities, concerning for possible pneumonia.    Electronically signed by: Adam Allen MD  Date:    04/26/2024  Time:    07:30        ASSESSMENT & PLAN:   Assessment:  Right lung base opacity, ?  Community-acquired pneumonia  PEDRITO versus CKD stage 3  Elevated BNP  Elevated glucose  Lactic acidosis  History of hypertension, hyperlipidemia, COPD and AML in remission    Plan:  - Continue with azithromycin.  Wiill add on Rocephin for possible community-acquired pneumonia  - Continue with DuoNebs as needed   - Solu-Medrol 40 mg daily  - Follow results of blood cultures  - Echo ordered for elevated BNP to rule out congestive heart failure  - Repeat lactic acid increased to 2.7.  Continue to trend  - On repeat labs this morning glucose increased to 220.  We will order hemoglobin A1c  - Resume appropriate home medications when deemed necessary   - Labs in AM      VTE  Prophylaxis: will be placed on Lovenox for DVT prophylaxis and will be advised to be as mobile as possible and sit in a chair as tolerated      __________________________________________________________________________  INPATIENT LIST OF MEDICATIONS     Current Facility-Administered Medications:     acetaminophen tablet 650 mg, 650 mg, Oral, Q6H PRN, Josefa Buchanan AGACNP-BC    albuterol-ipratropium 2.5 mg-0.5 mg/3 mL nebulizer solution 3 mL, 3 mL, Nebulization, Q4H PRN, Josefa Buchanan AGACNP-BC    aluminum-magnesium hydroxide-simethicone 200-200-20 mg/5 mL suspension 30 mL, 30 mL, Oral, QID PRN, Josefa Buchanan AGACNP-BC    [START ON 4/27/2024] azithromycin 500 mg in dextrose 5 % 250 mL IVPB (ready to mix), 500 mg, Intravenous, Q24H, Josefa Buchanan AGACNP-BC    cefTRIAXone (Rocephin) 1 g in dextrose 5 % in water (D5W) 100 mL IVPB (MB+), 1 g, Intravenous, Q24H, Tatiana Mancilla, PA-C, Stopped at 04/26/24 0952    enoxaparin injection 30 mg, 30 mg, Subcutaneous, Daily, Josefa Buchanan AGACNP-BC    lactated ringers infusion, , Intravenous, Continuous, Josefa Buchanan AGACNP-BC, Last Rate: 75 mL/hr at 04/26/24 0336, New Bag at 04/26/24 0336    melatonin tablet 6 mg, 6 mg, Oral, Nightly PRN, Josefa Buchanan AGACNP-BC    methylPREDNISolone sodium succinate injection 40 mg, 40 mg, Intravenous, Q12H, Josefa Buchanan AGAURBANP-BC, 40 mg at 04/26/24 0923    naloxone 0.4 mg/mL injection 0.02 mg, 0.02 mg, Intravenous, PRN, Josefa Buchanan AGACNP-BC    ondansetron injection 4 mg, 4 mg, Intravenous, Q4H PRN, Josefa Buchanan AGACNP-BC    polyethylene glycol packet 17 g, 17 g, Oral, BID PRN, Josefa Buchanan AGACNP-BC    prochlorperazine injection Soln 5 mg, 5 mg, Intravenous, Q6H PRN, Josefa Buchanan AGACNP-BC    simethicone chewable tablet 80 mg, 1 tablet, Oral, QID PRN, Josefa Buchanan AGACNP-BC      Scheduled Meds:  Current Facility-Administered Medications   Medication Dose Route Frequency    [START  ON 4/27/2024] azithromycin  500 mg Intravenous Q24H    cefTRIAXone (Rocephin) IV (PEDS and ADULTS)  1 g Intravenous Q24H    enoxparin  30 mg Subcutaneous Daily    methylPREDNISolone sodium succinate injection  40 mg Intravenous Q12H     Continuous Infusions:  Current Facility-Administered Medications   Medication Dose Route Frequency Last Rate Last Admin    lactated ringers   Intravenous Continuous 75 mL/hr at 04/26/24 0336 New Bag at 04/26/24 0336     PRN Meds:.  Current Facility-Administered Medications:     acetaminophen, 650 mg, Oral, Q6H PRN    albuterol-ipratropium, 3 mL, Nebulization, Q4H PRN    aluminum-magnesium hydroxide-simethicone, 30 mL, Oral, QID PRN    melatonin, 6 mg, Oral, Nightly PRN    naloxone, 0.02 mg, Intravenous, PRN    ondansetron, 4 mg, Intravenous, Q4H PRN    polyethylene glycol, 17 g, Oral, BID PRN    prochlorperazine, 5 mg, Intravenous, Q6H PRN    simethicone, 1 tablet, Oral, QID PRN      Discharge Planning and Disposition: Anticipated discharge to be determined.    Tatiana CHRISTINE PA, have reviewed and discussed the case with Dr. Cory Mancilla MD    Please see the following addendum for further assessment and plan from there attending MD.    Tatiana Mancilla PA-C  04/26/2024    ________________________________________________________________________________    MD Addendum:  Dr. Cory CHRISTINE MD assumed care of this patient today at ---am/pm  For the patient encounter, I performed the substantive portion of the visit, I reviewed the NP/PA documentation, treatment plan, and medical decision making.  I had face to face time with this patient     A. History:    B. Physical exam:    C. Medical decision making:      All diagnosis and differential diagnosis have been reviewed; assessment and plan has been documented; I have personally reviewed the labs and test results that are presently available; I have reviewed the patients medication list; I have reviewed the consulting  providers response and recommendations. I have reviewed or attempted to review medical records based upon their availability.    All of the patient and family questions have been addressed and answered. Patient's is agreeable to the above stated plan. I will continue to monitor closely and make adjustments to medical management as needed.      Cory Mancilla MD  04/26/2024

## 2024-04-26 NOTE — PLAN OF CARE
04/26/24 1127   Discharge Assessment   Assessment Type Discharge Planning Assessment   Confirmed/corrected address, phone number and insurance Yes   Confirmed Demographics Correct on Facesheet   Source of Information patient   Does patient/caregiver understand observation status Yes   Communicated RENARD with patient/caregiver Date not available/Unable to determine   Reason For Admission SOB with decreased O2 sats.   People in Home child(amanda), adult  (The patient lives with her adult son: Horace Pope)   Facility Arrived From: Private residence.   Do you expect to return to your current living situation? Yes   Do you have help at home or someone to help you manage your care at home? Yes   Who are your caregiver(s) and their phone number(s)? Daughter: Tanya Graciant: [phone]   Prior to hospitilization cognitive status: Alert/Oriented   Current cognitive status: Alert/Oriented   Walking or Climbing Stairs Difficulty yes   Walking or Climbing Stairs ambulation difficulty, requires equipment   Mobility Management The patient usesa rolling walker for mobility concerns PRN.   Dressing/Bathing Difficulty yes   Dressing/Bathing bathing difficulty, requires equipment   Dressing/Bathing Management The patient uses ashower chair and bath bench for bathing/showering needs.   Home Accessibility   (There are (4) steps to climb prior to entering her private rseidence.)   Home Layout Able to live on 1st floor   Equipment Currently Used at Home bath bench;grab bar;raised toilet;shower chair;walker, rolling;nebulizer   Readmission within 30 days? No   Patient currently being followed by outpatient case management? No   Do you currently have service(s) that help you manage your care at home? No   Do you take prescription medications? Yes   Do you have prescription coverage? Yes   Coverage Payor: [employer] - [email or web address] -   Do you have any problems affording any of your prescribed medications? No   Is the  patient taking medications as prescribed? yes   Who is going to help you get home at discharge? Daughter: Tanya Hernandez: [phone]   How do you get to doctors appointments? family or friend will provide   Are you on dialysis? No   Do you take coumadin? No   Discharge Plan A Home with family   Discharge Plan B Home with family   DME Needed Upon Discharge  none   Discharge Plan discussed with: Patient   Transition of Care Barriers None   Social Connections   In a typical week, how many times do you talk on the phone with family, friends, or neighbors? Three   How often do you get together with friends or relatives? Three times   How often do you attend Anglican or Taoism services? Never   Do you belong to any clubs or organizations such as Anglican groups, unions, fraternal or athletic groups, or school groups? No   How often do you attend meetings of the clubs or organizations you belong to? Never   Are you , , , , never , or living with a partner?    Alcohol Use   Q1: How often do you have a drink containing alcohol? Monthly or l   Q2: How many drinks containing alcohol do you have on a typical day when you are drinking? 1 or 2   Q3: How often do you have six or more drinks on one occasion? Never   OTHER   Name(s) of People in Home Son: Horace Perkins and Daughter: Tanya Bentley: [phone] is activel involved in her Mom's care and lives nearby.

## 2024-04-27 LAB
ACINETOBACTER CALCOACETICUS-BAUMANNII COMPLEX (OHS): NOT DETECTED
BACTEROIDES FRAGILIS (OHS): NOT DETECTED
C AURIS DNA BLD POS QL NAA+NON-PROBE: NOT DETECTED
C GATTII+NEOFOR DNA CSF QL NAA+NON-PROBE: NOT DETECTED
CANDIDA ALBICANS (OHS): NOT DETECTED
CANDIDA GLABRATA (OHS): NOT DETECTED
CANDIDA KRUSEI (OHS): NOT DETECTED
CANDIDA PARAPSILOSIS (OHS): NOT DETECTED
CANDIDA TROPICALIS (OHS): NOT DETECTED
CTX-M (OHS): ABNORMAL
ENTEROBACTER CLOACAE COMPLEX (OHS): NOT DETECTED
ENTEROBACTERALES (OHS): NOT DETECTED
ENTEROCOCCUS FAECALIS (OHS): NOT DETECTED
ENTEROCOCCUS FAECIUM (OHS): NOT DETECTED
ESCHERICHIA COLI (OHS): NOT DETECTED
GP B STREP DNA CSF QL NAA+NON-PROBE: NOT DETECTED
HAEM INFLU DNA CSF QL NAA+NON-PROBE: NOT DETECTED
IMP (OHS): ABNORMAL
KLEBSIELLA AEROGENES (OHS): NOT DETECTED
KLEBSIELLA OXYTOCA (OHS): NOT DETECTED
KLEBSIELLA PNEUMONIAE GROUP (OHS): NOT DETECTED
KPC (OHS): ABNORMAL
L MONOCYTOG DNA CSF QL NAA+NON-PROBE: NOT DETECTED
MCR-1 (OHS): ABNORMAL
MECA/C (OHS): ABNORMAL
MECA/C AND MREJ (MRSA)(OHS): ABNORMAL
N MEN DNA CSF QL NAA+NON-PROBE: NOT DETECTED
NDM (OHS): ABNORMAL
OXA-48-LIKE (OHS): ABNORMAL
PROTEUS SPP. (OHS): NOT DETECTED
PSEUDOMONAS AERUGINOSA (OHS): NOT DETECTED
S ENT+BONG DNA STL QL NAA+NON-PROBE: NOT DETECTED
S PNEUM DNA CSF QL NAA+NON-PROBE: NOT DETECTED
SERRATIA MARCESCENS (OHS): NOT DETECTED
STAPHYLOCOCCUS AUREUS (OHS): NOT DETECTED
STAPHYLOCOCCUS EPIDERMIDIS (OHS): NOT DETECTED
STAPHYLOCOCCUS LUGDUNENSIS (OHS): NOT DETECTED
STAPHYLOCOCCUS SPP. (OHS): DETECTED
STENOTROPHOMONAS MALTOPHILIA (OHS): NOT DETECTED
STREPTOCOCCUS PYOGENES (GROUP A)(OHS): NOT DETECTED
STREPTOCOCCUS SPP. (OHS): NOT DETECTED
VANA/B (OHS): ABNORMAL
VIM (OHS): ABNORMAL

## 2024-04-27 PROCEDURE — 25000003 PHARM REV CODE 250: Performed by: NURSE PRACTITIONER

## 2024-04-27 PROCEDURE — 94760 N-INVAS EAR/PLS OXIMETRY 1: CPT

## 2024-04-27 PROCEDURE — 27000221 HC OXYGEN, UP TO 24 HOURS

## 2024-04-27 PROCEDURE — 63600175 PHARM REV CODE 636 W HCPCS: Performed by: PHYSICIAN ASSISTANT

## 2024-04-27 PROCEDURE — 63600175 PHARM REV CODE 636 W HCPCS: Performed by: NURSE PRACTITIONER

## 2024-04-27 PROCEDURE — 25000003 PHARM REV CODE 250: Performed by: PHYSICIAN ASSISTANT

## 2024-04-27 PROCEDURE — 94640 AIRWAY INHALATION TREATMENT: CPT

## 2024-04-27 PROCEDURE — 63600175 PHARM REV CODE 636 W HCPCS: Performed by: STUDENT IN AN ORGANIZED HEALTH CARE EDUCATION/TRAINING PROGRAM

## 2024-04-27 PROCEDURE — 25000003 PHARM REV CODE 250: Performed by: STUDENT IN AN ORGANIZED HEALTH CARE EDUCATION/TRAINING PROGRAM

## 2024-04-27 PROCEDURE — 99900031 HC PATIENT EDUCATION (STAT)

## 2024-04-27 PROCEDURE — 99900035 HC TECH TIME PER 15 MIN (STAT)

## 2024-04-27 PROCEDURE — 21400001 HC TELEMETRY ROOM

## 2024-04-27 PROCEDURE — 25000242 PHARM REV CODE 250 ALT 637 W/ HCPCS: Performed by: STUDENT IN AN ORGANIZED HEALTH CARE EDUCATION/TRAINING PROGRAM

## 2024-04-27 RX ORDER — HYDROXYZINE PAMOATE 25 MG/1
25 CAPSULE ORAL 3 TIMES DAILY
Status: DISCONTINUED | OUTPATIENT
Start: 2024-04-27 | End: 2024-04-27

## 2024-04-27 RX ORDER — LISINOPRIL 10 MG/1
10 TABLET ORAL DAILY
Status: DISCONTINUED | OUTPATIENT
Start: 2024-04-28 | End: 2024-05-02

## 2024-04-27 RX ORDER — GABAPENTIN 400 MG/1
800 CAPSULE ORAL 2 TIMES DAILY
Status: DISCONTINUED | OUTPATIENT
Start: 2024-04-27 | End: 2024-05-03 | Stop reason: HOSPADM

## 2024-04-27 RX ORDER — VANCOMYCIN HCL IN 5 % DEXTROSE 1.25 G/25
20 PLASTIC BAG, INJECTION (ML) INTRAVENOUS ONCE
Status: DISCONTINUED | OUTPATIENT
Start: 2024-04-27 | End: 2024-04-27

## 2024-04-27 RX ORDER — HYDROXYZINE PAMOATE 25 MG/1
25 CAPSULE ORAL DAILY
Status: DISCONTINUED | OUTPATIENT
Start: 2024-04-27 | End: 2024-05-03 | Stop reason: HOSPADM

## 2024-04-27 RX ORDER — PANTOPRAZOLE SODIUM 40 MG/1
40 TABLET, DELAYED RELEASE ORAL DAILY
Status: DISCONTINUED | OUTPATIENT
Start: 2024-04-28 | End: 2024-05-03 | Stop reason: HOSPADM

## 2024-04-27 RX ADMIN — GUAIFENESIN 200 MG: 200 SOLUTION ORAL at 08:04

## 2024-04-27 RX ADMIN — VANCOMYCIN HYDROCHLORIDE 1250 MG: 1.25 INJECTION, POWDER, LYOPHILIZED, FOR SOLUTION INTRAVENOUS at 12:04

## 2024-04-27 RX ADMIN — PAROXETINE HYDROCHLORIDE 40 MG: 20 TABLET, FILM COATED ORAL at 10:04

## 2024-04-27 RX ADMIN — BUDESONIDE 0.25 MG: 0.5 INHALANT RESPIRATORY (INHALATION) at 07:04

## 2024-04-27 RX ADMIN — ENOXAPARIN SODIUM 30 MG: 30 INJECTION SUBCUTANEOUS at 04:04

## 2024-04-27 RX ADMIN — CEFTRIAXONE SODIUM 1 G: 1 INJECTION, POWDER, FOR SOLUTION INTRAMUSCULAR; INTRAVENOUS at 10:04

## 2024-04-27 RX ADMIN — METHYLPREDNISOLONE SODIUM SUCCINATE 40 MG: 40 INJECTION, POWDER, FOR SOLUTION INTRAMUSCULAR; INTRAVENOUS at 09:04

## 2024-04-27 RX ADMIN — AZITHROMYCIN MONOHYDRATE 500 MG: 500 INJECTION, POWDER, LYOPHILIZED, FOR SOLUTION INTRAVENOUS at 01:04

## 2024-04-27 RX ADMIN — HYDROXYZINE PAMOATE 50 MG: 50 CAPSULE ORAL at 08:04

## 2024-04-27 RX ADMIN — IPRATROPIUM BROMIDE AND ALBUTEROL SULFATE 3 ML: .5; 3 SOLUTION RESPIRATORY (INHALATION) at 07:04

## 2024-04-27 RX ADMIN — ALUMINUM HYDROXIDE, MAGNESIUM HYDROXIDE, AND SIMETHICONE 30 ML: 200; 200; 20 SUSPENSION ORAL at 02:04

## 2024-04-27 RX ADMIN — ONDANSETRON 4 MG: 2 INJECTION INTRAMUSCULAR; INTRAVENOUS at 02:04

## 2024-04-27 RX ADMIN — GABAPENTIN 800 MG: 400 CAPSULE ORAL at 08:04

## 2024-04-27 RX ADMIN — METHYLPREDNISOLONE SODIUM SUCCINATE 40 MG: 40 INJECTION, POWDER, FOR SOLUTION INTRAMUSCULAR; INTRAVENOUS at 10:04

## 2024-04-27 RX ADMIN — HYDROXYZINE PAMOATE 25 MG: 25 CAPSULE ORAL at 04:04

## 2024-04-27 RX ADMIN — METOPROLOL SUCCINATE 25 MG: 25 TABLET, EXTENDED RELEASE ORAL at 10:04

## 2024-04-27 NOTE — PROGRESS NOTES
[facility]  Hospital Medicine Progress Note      Chief Complaint: shortness of breath     HPI: (personally reviewed by me and is documented from initial H&P)     64 y.o. White female with a past medical history of hypertension, hyperlipidemia, COPD not on home oxygen and AML in remission for 15 years. The patient presented to [facility] on 4/25/2024 with a primary complaint of shortness of breath and a productive cough with white sputum.  Patient reports cough has been present for the last 2 weeks progressively worsening resulting in shortness of breath.  She was had 1-2 episodes of diarrhea since presentation to hospital.  She denies complaints of fever, chills, abdominal pain, nausea, vomiting and diarrhea.     Upon presentation to the ED, temperature 97.9° F, heart rate 85, blood pressure 136/93, respiratory rate 33 and SpO2 99% on 15 L non-rebreather.  Labs with WBC 9.34, BUN/creatinine 17.4/1.65 (19.7/1.00 in November 2022), glucose 160, , troponin 0.010, lactic acid 2.5.  Influenza a/B and SARS-COV-2 PCR negative.  EKG normal sinus rhythm with nonspecific ST abnormalities.  Chest x-ray with patchy right lung base opacity concerning for possible pneumonia.  In ED patient received DuoNebs, azithromycin, Solu-Medrol, ipratropium and IV fluid hydration.  Patient was oxygen has been weaned down to 2 L nasal cannula.  She is admitted to hospital medicine services for further medical management.  Interval Hx:     Chest x-ray reveals patchy right lung base opacities concerning for pneumonia.    No leukocytosis or neutrophilia on CBC with differential.    Lactic acidosis noted on initial lab work however that has since resolved.    She is non gap acidosis likely secondary to elevated lactic acid.    On admission it was noted that patient had acute kidney injury increase from baseline however downtrending   She also has documented AML in remission for 15 years.   She is independent of all  ADLs and I ADLs.   One of 2 blood cultures positive for Gram-positive Staphylococcus    __________________________________________________________________________________________________________________________________  Objective/physical exam:  Vital signs have been personally reviewed by me   General: Appears comfortable, no acute distress.  Neuro:     Integumentary: Warm, dry, intact.  Musculoskeletal: Purposeful movement noted.     Respiratory: No accessory muscle use. Breath sounds are equal.  Cardiovascular: Regular rate.       VITAL SIGNS: 24 HRS MIN & MAX LAST   Temp  Min: 97.5 °F (36.4 °C)  Max: 98.3 °F (36.8 °C) 98.1 °F (36.7 °C)   BP  Min: 114/60  Max: 135/74 127/63   Pulse  Min: 59  Max: 67  65   Resp  Min: 18  Max: 21 18   SpO2  Min: 96 %  Max: 99 % 97 %     X-Ray Chest AP Portable  Narrative: EXAMINATION:  XR CHEST AP PORTABLE    CLINICAL HISTORY:  Shortness of breath, cough.    TECHNIQUE:  Single frontal view of the chest was performed.    COMPARISON:  PA and lateral chest radiograph, 05/07/2023.    FINDINGS:  Patchy right lung base opacities.  No pleural effusion.  No pneumothorax.    Cardiomediastinal silhouette is within normal limits for size.  Aortic calcifications are present.    No acute osseous abnormality.  Impression: Patchy right lung base opacities, concerning for possible pneumonia.    Electronically signed by: Adam Allen MD  Date:    04/26/2024  Time:    07:30    Recent Labs   Lab 04/25/24 2259 04/26/24  0314   WBC 9.43 9.19   RBC 4.12* 3.35*   HGB 13.5 11.1*   HCT 39.7 33.0*   MCV 96.4* 98.5*   MCH 32.8* 33.1*   MCHC 34.0 33.6   RDW 13.0 13.1    209   MPV 9.5 9.4       Recent Labs   Lab 04/25/24  2259 04/26/24  0314    139   K 4.1 3.9   CO2 22* 19*   BUN 17.4 20.1   CREATININE 1.65* 1.44*   CALCIUM 8.9 8.5   MG  --  1.60   ALBUMIN 3.9 3.4   ALKPHOS 114 88   ALT 41 42   AST 45* 40*   BILITOT 0.4 0.3     Microbiology Results (last 7 days)       Procedure Component  Value Units Date/Time    BCID2 Panel [[id number]]  (Abnormal) Collected: 04/26/24 0107    Order Status: Completed Specimen: Blood Updated: 04/27/24 1110     CTX-M (ESBL ) N/A     IMP (Cabapenemase ) N/A     KPC resistance gene (Carbapenemase ) N/A     mcr-1 N/A     mecA ID N/A     Comment: Note: Antimicrobial resistance can occur via multiple mechanisms. A Not Detected result for antimicrobial resistance gene(s) does not indicate antimicrobial susceptibility. Subculturing is required for species identification and susceptibility testing of   isolates.        mecA/C and MREJ (MRSA) gene N/A     NDM (Carbapenemase ) N/A     OXA-48-like (Carbapenemase ) N/A     Marlen/B (VRE gene) N/A     VIM (Carbapenemase ) N/A     Enterococcus faecalis Not Detected     Enterococcus faecium Not Detected     Listeria monocytogenes Not Detected     Staphylococcus spp. Detected     Staphylococcus aureus Not Detected     Staphylococcus epidermidis Not Detected     Staphylococcus lugdunensis Not Detected     Streptococcus spp. Not Detected     Streptococcus agalactiae (Group B) Not Detected     Streptococcus pneumoniae Not Detected     Streptococcus pyogenes (Group A) Not Detected     Acinetobacter calcoaceticus/baumannii complex Not Detected     Bacteroides fragilis Not Detected     Enterobacterales Not Detected     Enterobacter cloacae complex Not Detected     Escherichia coli Not Detected     Klebsiella aerogenes Not Detected     Klebsiella oxytoca Not Detected     Klebsiella pneumoniae group Not Detected     Proteus spp. Not Detected     Salmonella spp. Not Detected     Serratia marcescens Not Detected     Haemophilus influenzae Not Detected     Neisseria meningitidis Not Detected     Pseudomonas aeruginosa Not Detected     Stenotrophomonas maltophilia Not Detected     Candida albicans Not Detected     Candida auris Not Detected     Candida glabrata Not Detected     Ivania krusei Not  Detected     Candida parapsilosis Not Detected     Candida tropicalis Not Detected     Cryptococcus neoformans/gattii Not Detected    Narrative:      The [employer] BCID2 Panel is a multiplexed nucleic acid test intended for the use with MobileSnack® 2.0 or MobileSnack® [employer] for the simultaneous qualitative detection and identification of multiple bacterial and yeast nucleic acids and select genetic determinants associated with antimicrobial resistance.  The [employer] BCID2 Panel test is performed directly on blood culture samples identified as positive by a continuous monitoring blood culture system.  Results are intended to be interpreted in conjunction with Gram stain results.    Blood culture #2 **CANNOT BE ORDERED STAT** [[id number]]  (Abnormal) Collected: 04/26/24 0107    Order Status: Completed Specimen: Blood Updated: 04/27/24 0959     GRAM STAIN Gram Positive Cocci, probable Staphylococcus      Seen in gram stain of broth only      1 of 2 Aerobic bottles positive    Blood culture #1 **CANNOT BE ORDERED STAT** [[id number]]  (Normal) Collected: 04/26/24 0106    Order Status: Completed Specimen: Blood Updated: 04/27/24 0406     CULTURE, BLOOD ([facility]) No Growth At 24 Hours             Scheduled Med:  Current Facility-Administered Medications   Medication Dose Route Frequency    albuterol-ipratropium  3 mL Nebulization Q12H    azithromycin  500 mg Intravenous Q24H    cefTRIAXone (Rocephin) IV (PEDS and ADULTS)  1 g Intravenous Q24H    enoxparin  30 mg Subcutaneous Daily    gabapentin  800 mg Oral BID    hydrOXYzine pamoate  50 mg Oral QHS    [START ON 4/28/2024] lisinopriL  10 mg Oral Daily    methylPREDNISolone sodium succinate injection  40 mg Intravenous Q12H    metoprolol succinate  25 mg Oral Daily    [START ON 4/28/2024] pantoprazole  40 mg Oral Daily    paroxetine  40 mg Oral Daily      Continuous Infusions:  Current Facility-Administered Medications   Medication Dose Route Frequency Last  Rate Last Admin    lactated ringers   Intravenous Continuous 75 mL/hr at 04/26/24 0336 New Bag at 04/26/24 0336        PRN Meds:    Current Facility-Administered Medications:     acetaminophen, 650 mg, Oral, Q6H PRN    albuterol-ipratropium, 3 mL, Nebulization, Q4H PRN    aluminum-magnesium hydroxide-simethicone, 30 mL, Oral, QID PRN    budesonide, 0.25 mg, Nebulization, Daily PRN    guaiFENesin 100 mg/5 ml, 200 mg, Oral, Q4H PRN    melatonin, 6 mg, Oral, Nightly PRN    naloxone, 0.02 mg, Intravenous, PRN    ondansetron, 4 mg, Intravenous, Q4H PRN    polyethylene glycol, 17 g, Oral, BID PRN    prochlorperazine, 5 mg, Intravenous, Q6H PRN    simethicone, 1 tablet, Oral, QID PRN    vancomycin - pharmacy to dose, , Intravenous, pharmacy to manage frequency   __________________________________________________________________________________________________________________________________  Assessment/Plan:  Community-acquired pneumonia  AML (acute myeloid leukemia) in remission  COPD (chronic obstructive pulmonary disease)  Hyperlipidemia  Hypertension  Neuropathy      Above present on admission     Anticipated discharge and Disposition when medically stable:    _______________________________________________________________________________________________________________________________  Patient presents for shortness of breath currently being treated for pneumonia with azithromycin and Rocephin.    Follow-up on results of cultures.     One of 2 blood cultures positive for Gram-positive Staphylococcus; will broaden patient's antibiotic coverage with vancomycin, pharmacy to dose.  Repeat blood cultures in 24 hours post initiation of vancomycin.  Continue supportive care  Continue checking vital signs q4hrs.    Reviewed and restarted appropriate home medications.     Nurse notified to page me if any changes occur     DVT prophylaxis initiated   Nutrition Status:  Heart healthy  diet  _______________________________________________________________________________________________________________________________    This is a critical care document  Critical Care Diagnosis:  Hypoxic respiratory failure requiring supplemental oxygen therapy  Critical care interventions: hands on evaluation, review of labs/radiographs/records and discussions; assessing and managing the high probability of imminent or life-threatening deterioration of cardiorespiratory status.  Critical care time spent > 40 minutes.       I have spent >30 minutes on the day of the visit; time spent includes face to face time and non-face to face time preparing to see the patient (eg, review of tests), independently reviewing and interpreting medical records, both past and current; documenting clinical information in the electronic or other health record, and communicating results to the patient/family/caregiver and care coordinator and nursing team.      All diagnosis and differential diagnosis have been reviewed,  interpreted and communicated appropriately to care team. assessment and plan has been documented; I have personally reviewed the labs and test results that are presently available and pertinent to this hospital course; I have reviewed medical records based upon their availability.    All of the patient's questions have been  addressed and answered. Patient's is agreeable to the above stated plan.   I will continue to monitor closely and make adjustments to medical management as needed.    Noris Horton,    04/27/2024     This note was created with the assistance of Dragon voice recognition software. There may be transcription errors as a result of using this technology however minimal. Effort has been made to assure accuracy of transcription but any obvious errors or omissions should be clarified with the author of the document.

## 2024-04-27 NOTE — PROGRESS NOTES
Pharmacokinetic Initial Assessment: IV Vancomycin    Assessment/Plan:    Initiate intravenous vancomycin with loading dose of 20mg/kg = 1250mg  mg once with subsequent doses when random concentrations are less than 20 mcg/mL  Desired empiric serum trough concentration is 15 to 20 mcg/mL  Draw vancomycin random level on 04/28 at 0430.  Pharmacy will continue to follow and monitor vancomycin.      Please contact pharmacy at extension [phone] with any questions regarding this assessment.     Thank you for the consult,   Lowell Yates       Patient brief summary:  Allyssa Sood is a 64 y.o. female initiated on antimicrobial therapy with IV Vancomycin for treatment of suspected bacteremia    Drug Allergies:   Review of patient's allergies indicates:   Allergen Reactions    Lovastatin Dermatitis    Penicillins Shortness Of Breath    Sulfa (sulfonamide antibiotics) Rash       Actual Body Weight:   59kg    Renal Function:   Estimated Creatinine Clearance: 30.4 mL/min (A) (based on SCr of 1.44 mg/dL (H)).,     Dialysis Method (if applicable):  N/A    CBC (last 72 hours):  Recent Labs   Lab Result Units 04/25/24 2259 04/26/24 0314 04/26/24  1221   WBC x10(3)/mcL 9.43 9.19  --    Hgb g/dL 13.5 11.1*  --    Hemoglobin A1c %  --   --  5.2   Hct % 39.7 33.0*  --    Platelet x10(3)/mcL 312 209  --    Mono % % 7.3 2.0  --    Eos % % 2.8 0.1  --    Basophil % % 0.3 0.2  --        Metabolic Panel (last 72 hours):  Recent Labs   Lab Result Units 04/25/24 2259 04/26/24 0314   Sodium Level mmol/L 142 139   Potassium Level mmol/L 4.1 3.9   Chloride mmol/L 107 109*   Carbon Dioxide mmol/L 22* 19*   Glucose Level mg/dL 160* 220*   Blood Urea Nitrogen mg/dL 17.4 20.1   Creatinine mg/dL 1.65* 1.44*   Albumin Level g/dL 3.9 3.4   Bilirubin Total mg/dL 0.4 0.3   Alkaline Phosphatase unit/L 114 88   Aspartate Aminotransferase unit/L 45* 40*   Alanine Aminotransferase unit/L 41 42   Magnesium Level mg/dL  --  1.60   Phosphorus Level mg/dL  --   "2.2*       Drug levels (last 3 results):  No results for input(s): "VANCOMYCINRA", "VANCORANDOM", "VANCOMYCINPE", "VANCOPEAK", "VANCOMYCINTR", "VANCOTROUGH" in the last 72 hours.    Microbiologic Results:  Microbiology Results (last 7 days)       Procedure Component Value Units Date/Time    Blood culture #2 **CANNOT BE ORDERED STAT** [[id number]]  (Abnormal) Collected: 04/26/24 0107    Order Status: Completed Specimen: Blood Updated: 04/27/24 0959     GRAM STAIN Gram Positive Cocci, probable Staphylococcus      Seen in gram stain of broth only      1 of 2 Aerobic bottles positive    BCID2 Panel [[id number]] Collected: 04/26/24 0107    Order Status: Sent Specimen: Blood Updated: 04/27/24 0959    Blood culture #1 **CANNOT BE ORDERED STAT** [[id number]]  (Normal) Collected: 04/26/24 0106    Order Status: Completed Specimen: Blood Updated: 04/27/24 0406     CULTURE, BLOOD ([facility]) No Growth At 24 Hours            "

## 2024-04-28 LAB
ALBUMIN SERPL-MCNC: 3.6 G/DL (ref 3.4–4.8)
ALBUMIN/GLOB SERPL: 1 RATIO (ref 1.1–2)
ALLENS TEST BLOOD GAS (OHS): ABNORMAL
ALP SERPL-CCNC: 100 UNIT/L (ref 40–150)
ALT SERPL-CCNC: 388 UNIT/L (ref 0–55)
AST SERPL-CCNC: 192 UNIT/L (ref 5–34)
BASE EXCESS BLD CALC-SCNC: -11 MMOL/L (ref -2–2)
BASE EXCESS BLD CALC-SCNC: 4.7 MMOL/L (ref -2–2)
BASOPHILS # BLD AUTO: 0.03 X10(3)/MCL
BASOPHILS NFR BLD AUTO: 0.2 %
BILIRUB SERPL-MCNC: 0.4 MG/DL
BIPAP(E) BLOOD GAS (OHS): 6 CM H2O
BIPAP(I) BLOOD GAS (OHS): 12 CM H2O
BLOOD GAS SAMPLE TYPE (OHS): ABNORMAL
BLOOD GAS SAMPLE TYPE (OHS): ABNORMAL
BUN SERPL-MCNC: 26.8 MG/DL (ref 9.8–20.1)
CA-I BLD-SCNC: 1.14 MMOL/L (ref 1.12–1.23)
CA-I BLD-SCNC: 1.24 MMOL/L (ref 1.12–1.23)
CALCIUM SERPL-MCNC: 8.7 MG/DL (ref 8.4–10.2)
CHLORIDE SERPL-SCNC: 108 MMOL/L (ref 98–107)
CO2 BLDA-SCNC: 21.4 MMOL/L
CO2 BLDA-SCNC: 30.5 MMOL/L
CO2 SERPL-SCNC: 15 MMOL/L (ref 23–31)
COHGB MFR BLDA: 1.8 % (ref 0.5–1.5)
COHGB MFR BLDA: 2.1 % (ref 0.5–1.5)
CREAT SERPL-MCNC: 0.94 MG/DL (ref 0.55–1.02)
CREAT SERPL-MCNC: 1.27 MG/DL (ref 0.55–1.02)
DRAWN BY BLOOD GAS (OHS): ABNORMAL
DRAWN BY BLOOD GAS (OHS): ABNORMAL
EOSINOPHIL # BLD AUTO: 0.01 X10(3)/MCL (ref 0–0.9)
EOSINOPHIL NFR BLD AUTO: 0.1 %
ERYTHROCYTE [DISTWIDTH] IN BLOOD BY AUTOMATED COUNT: 13.8 % (ref 11.5–17)
GFR SERPLBLD CREATININE-BSD FMLA CKD-EPI: 47 MLS/MIN/1.73/M2
GFR SERPLBLD CREATININE-BSD FMLA CKD-EPI: >60 MLS/MIN/1.73/M2
GLOBULIN SER-MCNC: 3.6 GM/DL (ref 2.4–3.5)
GLUCOSE SERPL-MCNC: 218 MG/DL (ref 82–115)
HCO3 BLDA-SCNC: 19.4 MMOL/L (ref 22–26)
HCO3 BLDA-SCNC: 29.2 MMOL/L (ref 22–26)
HCT VFR BLD AUTO: 38 % (ref 37–47)
HGB BLD-MCNC: 12.7 G/DL (ref 12–16)
IMM GRANULOCYTES # BLD AUTO: 0.13 X10(3)/MCL (ref 0–0.04)
IMM GRANULOCYTES NFR BLD AUTO: 0.8 %
INHALED O2 CONCENTRATION: 30 %
INHALED O2 CONCENTRATION: 40 %
LYMPHOCYTES # BLD AUTO: 3.05 X10(3)/MCL (ref 0.6–4.6)
LYMPHOCYTES NFR BLD AUTO: 18.3 %
MCH RBC QN AUTO: 33.7 PG (ref 27–31)
MCHC RBC AUTO-ENTMCNC: 33.4 G/DL (ref 33–36)
MCV RBC AUTO: 100.8 FL (ref 80–94)
MECH RR (OHS): 14 B/MIN
METHGB MFR BLDA: 0.7 % (ref 0.4–1.5)
METHGB MFR BLDA: 1 % (ref 0.4–1.5)
MODE (OHS): ABNORMAL
MONOCYTES # BLD AUTO: 0.78 X10(3)/MCL (ref 0.1–1.3)
MONOCYTES NFR BLD AUTO: 4.7 %
NEUTROPHILS # BLD AUTO: 12.66 X10(3)/MCL (ref 2.1–9.2)
NEUTROPHILS NFR BLD AUTO: 75.9 %
NRBC BLD AUTO-RTO: 0 %
O2 HB BLOOD GAS (OHS): 93.9 % (ref 94–97)
O2 HB BLOOD GAS (OHS): 95.6 % (ref 94–97)
OHS QRS DURATION: 108 MS
OHS QTC CALCULATION: 495 MS
OXYGEN DEVICE BLOOD GAS (OHS): ABNORMAL
OXYHGB MFR BLDA: 11.2 G/DL (ref 12–16)
OXYHGB MFR BLDA: 12.7 G/DL (ref 12–16)
PCO2 BLDA: 42 MMHG (ref 35–45)
PCO2 BLDA: 64 MMHG (ref 35–45)
PH BLDA: 7.09 [PH] (ref 7.35–7.45)
PH BLDA: 7.45 [PH] (ref 7.35–7.45)
PLATELET # BLD AUTO: 303 X10(3)/MCL (ref 130–400)
PMV BLD AUTO: 10.3 FL (ref 7.4–10.4)
PO2 BLDA: 82 MMHG (ref 80–100)
PO2 BLDA: 92 MMHG (ref 80–100)
POCT GLUCOSE: 166 MG/DL (ref 70–110)
POTASSIUM BLOOD GAS (OHS): 4.1 MMOL/L (ref 3.5–5)
POTASSIUM BLOOD GAS (OHS): 4.1 MMOL/L (ref 3.5–5)
POTASSIUM SERPL-SCNC: 4.6 MMOL/L (ref 3.5–5.1)
PROT SERPL-MCNC: 7.2 GM/DL (ref 5.8–7.6)
RBC # BLD AUTO: 3.77 X10(6)/MCL (ref 4.2–5.4)
SAMPLE SITE BLOOD GAS (OHS): ABNORMAL
SAMPLE SITE BLOOD GAS (OHS): ABNORMAL
SAO2 % BLDA: 93.3 %
SAO2 % BLDA: 96.5 %
SODIUM BLOOD GAS (OHS): 136 MMOL/L (ref 137–145)
SODIUM BLOOD GAS (OHS): 136 MMOL/L (ref 137–145)
SODIUM SERPL-SCNC: 139 MMOL/L (ref 136–145)
TROPONIN I SERPL-MCNC: <0.01 NG/ML (ref 0–0.04)
VANCOMYCIN SERPL-MCNC: 10.1 UG/ML (ref 15–20)
WBC # SPEC AUTO: 16.66 X10(3)/MCL (ref 4.5–11.5)

## 2024-04-28 PROCEDURE — 25000242 PHARM REV CODE 250 ALT 637 W/ HCPCS: Performed by: NURSE PRACTITIONER

## 2024-04-28 PROCEDURE — 93005 ELECTROCARDIOGRAM TRACING: CPT

## 2024-04-28 PROCEDURE — 27000221 HC OXYGEN, UP TO 24 HOURS

## 2024-04-28 PROCEDURE — 80202 ASSAY OF VANCOMYCIN: CPT | Performed by: STUDENT IN AN ORGANIZED HEALTH CARE EDUCATION/TRAINING PROGRAM

## 2024-04-28 PROCEDURE — 27000190 HC CPAP FULL FACE MASK W/VALVE

## 2024-04-28 PROCEDURE — 80053 COMPREHEN METABOLIC PANEL: CPT | Performed by: STUDENT IN AN ORGANIZED HEALTH CARE EDUCATION/TRAINING PROGRAM

## 2024-04-28 PROCEDURE — 63600175 PHARM REV CODE 636 W HCPCS: Performed by: NURSE PRACTITIONER

## 2024-04-28 PROCEDURE — 25000242 PHARM REV CODE 250 ALT 637 W/ HCPCS: Performed by: STUDENT IN AN ORGANIZED HEALTH CARE EDUCATION/TRAINING PROGRAM

## 2024-04-28 PROCEDURE — 21400001 HC TELEMETRY ROOM

## 2024-04-28 PROCEDURE — 94760 N-INVAS EAR/PLS OXIMETRY 1: CPT

## 2024-04-28 PROCEDURE — 25000003 PHARM REV CODE 250: Performed by: STUDENT IN AN ORGANIZED HEALTH CARE EDUCATION/TRAINING PROGRAM

## 2024-04-28 PROCEDURE — 25000003 PHARM REV CODE 250: Performed by: PHYSICIAN ASSISTANT

## 2024-04-28 PROCEDURE — 99900031 HC PATIENT EDUCATION (STAT)

## 2024-04-28 PROCEDURE — 63600175 PHARM REV CODE 636 W HCPCS: Performed by: PHYSICIAN ASSISTANT

## 2024-04-28 PROCEDURE — 63600175 PHARM REV CODE 636 W HCPCS: Performed by: STUDENT IN AN ORGANIZED HEALTH CARE EDUCATION/TRAINING PROGRAM

## 2024-04-28 PROCEDURE — 94660 CPAP INITIATION&MGMT: CPT

## 2024-04-28 PROCEDURE — 85025 COMPLETE CBC W/AUTO DIFF WBC: CPT | Performed by: STUDENT IN AN ORGANIZED HEALTH CARE EDUCATION/TRAINING PROGRAM

## 2024-04-28 PROCEDURE — 36415 COLL VENOUS BLD VENIPUNCTURE: CPT | Performed by: STUDENT IN AN ORGANIZED HEALTH CARE EDUCATION/TRAINING PROGRAM

## 2024-04-28 PROCEDURE — 36600 WITHDRAWAL OF ARTERIAL BLOOD: CPT

## 2024-04-28 PROCEDURE — 25000003 PHARM REV CODE 250: Performed by: NURSE PRACTITIONER

## 2024-04-28 PROCEDURE — 27100171 HC OXYGEN HIGH FLOW UP TO 24 HOURS

## 2024-04-28 PROCEDURE — 94640 AIRWAY INHALATION TREATMENT: CPT

## 2024-04-28 PROCEDURE — 84484 ASSAY OF TROPONIN QUANT: CPT | Performed by: STUDENT IN AN ORGANIZED HEALTH CARE EDUCATION/TRAINING PROGRAM

## 2024-04-28 PROCEDURE — 99900035 HC TECH TIME PER 15 MIN (STAT)

## 2024-04-28 PROCEDURE — 5A09357 ASSISTANCE WITH RESPIRATORY VENTILATION, LESS THAN 24 CONSECUTIVE HOURS, CONTINUOUS POSITIVE AIRWAY PRESSURE: ICD-10-PCS | Performed by: STUDENT IN AN ORGANIZED HEALTH CARE EDUCATION/TRAINING PROGRAM

## 2024-04-28 PROCEDURE — 82565 ASSAY OF CREATININE: CPT | Performed by: STUDENT IN AN ORGANIZED HEALTH CARE EDUCATION/TRAINING PROGRAM

## 2024-04-28 PROCEDURE — 82803 BLOOD GASES ANY COMBINATION: CPT

## 2024-04-28 PROCEDURE — 93010 ELECTROCARDIOGRAM REPORT: CPT | Mod: ,,, | Performed by: INTERNAL MEDICINE

## 2024-04-28 RX ORDER — DILTIAZEM HYDROCHLORIDE 5 MG/ML
0.25 INJECTION INTRAVENOUS ONCE
Status: CANCELLED | OUTPATIENT
Start: 2024-04-28 | End: 2024-04-28

## 2024-04-28 RX ORDER — INDOMETHACIN 25 MG/1
50 CAPSULE ORAL ONCE
Status: COMPLETED | OUTPATIENT
Start: 2024-04-28 | End: 2024-04-28

## 2024-04-28 RX ORDER — DIPHENHYDRAMINE HYDROCHLORIDE 50 MG/ML
INJECTION INTRAMUSCULAR; INTRAVENOUS
Status: DISPENSED
Start: 2024-04-28 | End: 2024-04-29

## 2024-04-28 RX ORDER — SODIUM BICARBONATE 650 MG/1
650 TABLET ORAL 2 TIMES DAILY
Status: DISCONTINUED | OUTPATIENT
Start: 2024-04-28 | End: 2024-04-30

## 2024-04-28 RX ORDER — VANCOMYCIN HCL IN 5 % DEXTROSE 1.25 G/25
1250 PLASTIC BAG, INJECTION (ML) INTRAVENOUS ONCE
Status: DISCONTINUED | OUTPATIENT
Start: 2024-04-28 | End: 2024-04-28

## 2024-04-28 RX ORDER — VANCOMYCIN HCL IN 5 % DEXTROSE 1.25 G/25
1250 PLASTIC BAG, INJECTION (ML) INTRAVENOUS
Status: DISCONTINUED | OUTPATIENT
Start: 2024-04-28 | End: 2024-04-28

## 2024-04-28 RX ORDER — IPRATROPIUM BROMIDE AND ALBUTEROL SULFATE 2.5; .5 MG/3ML; MG/3ML
3 SOLUTION RESPIRATORY (INHALATION) EVERY 6 HOURS
Status: DISCONTINUED | OUTPATIENT
Start: 2024-04-29 | End: 2024-05-02

## 2024-04-28 RX ORDER — DIPHENHYDRAMINE HYDROCHLORIDE 50 MG/ML
50 INJECTION INTRAMUSCULAR; INTRAVENOUS ONCE
Status: COMPLETED | OUTPATIENT
Start: 2024-04-28 | End: 2024-04-28

## 2024-04-28 RX ADMIN — HYDROXYZINE PAMOATE 25 MG: 25 CAPSULE ORAL at 10:04

## 2024-04-28 RX ADMIN — LISINOPRIL 10 MG: 10 TABLET ORAL at 10:04

## 2024-04-28 RX ADMIN — HYDROXYZINE PAMOATE 50 MG: 50 CAPSULE ORAL at 09:04

## 2024-04-28 RX ADMIN — SODIUM BICARBONATE 50 MEQ: 84 INJECTION, SOLUTION INTRAVENOUS at 04:04

## 2024-04-28 RX ADMIN — PANTOPRAZOLE SODIUM 40 MG: 40 TABLET, DELAYED RELEASE ORAL at 10:04

## 2024-04-28 RX ADMIN — METOPROLOL SUCCINATE 25 MG: 25 TABLET, EXTENDED RELEASE ORAL at 10:04

## 2024-04-28 RX ADMIN — IPRATROPIUM BROMIDE AND ALBUTEROL SULFATE 3 ML: .5; 3 SOLUTION RESPIRATORY (INHALATION) at 08:04

## 2024-04-28 RX ADMIN — ENOXAPARIN SODIUM 30 MG: 30 INJECTION SUBCUTANEOUS at 09:04

## 2024-04-28 RX ADMIN — VANCOMYCIN HYDROCHLORIDE 1250 MG: 1.25 INJECTION, POWDER, LYOPHILIZED, FOR SOLUTION INTRAVENOUS at 02:04

## 2024-04-28 RX ADMIN — GABAPENTIN 800 MG: 400 CAPSULE ORAL at 10:04

## 2024-04-28 RX ADMIN — PAROXETINE HYDROCHLORIDE 40 MG: 20 TABLET, FILM COATED ORAL at 10:04

## 2024-04-28 RX ADMIN — IPRATROPIUM BROMIDE AND ALBUTEROL SULFATE 3 ML: .5; 3 SOLUTION RESPIRATORY (INHALATION) at 04:04

## 2024-04-28 RX ADMIN — METHYLPREDNISOLONE SODIUM SUCCINATE 40 MG: 40 INJECTION, POWDER, FOR SOLUTION INTRAMUSCULAR; INTRAVENOUS at 10:04

## 2024-04-28 RX ADMIN — DIPHENHYDRAMINE HYDROCHLORIDE 50 MG: 50 INJECTION INTRAMUSCULAR; INTRAVENOUS at 04:04

## 2024-04-28 RX ADMIN — GUAIFENESIN 200 MG: 200 SOLUTION ORAL at 01:04

## 2024-04-28 RX ADMIN — CEFTRIAXONE SODIUM 1 G: 1 INJECTION, POWDER, FOR SOLUTION INTRAMUSCULAR; INTRAVENOUS at 10:04

## 2024-04-28 RX ADMIN — SODIUM BICARBONATE 650 MG TABLET 650 MG: at 09:04

## 2024-04-28 RX ADMIN — GABAPENTIN 800 MG: 400 CAPSULE ORAL at 09:04

## 2024-04-28 RX ADMIN — AZITHROMYCIN MONOHYDRATE 500 MG: 500 INJECTION, POWDER, LYOPHILIZED, FOR SOLUTION INTRAVENOUS at 01:04

## 2024-04-28 NOTE — PROGRESS NOTES
Pharmacokinetic Assessment Follow Up: IV Vancomycin    Vancomycin serum concentration assessment(s):    The random level was drawn correctly and can be used to guide therapy at this time. The measurement is below the desired definitive target range of 15 to 20 mcg/mL.    Vancomycin Regimen Plan:    Change regimen to Vancomycin 1250 mg IV every 24 hours with next serum trough concentration measured at 0800 prior to 3rd dose on 04/30.    Drug levels (last 3 results):  Recent Labs   Lab Result Units 04/28/24  0431   Vanc Lvl Random ug/ml 10.1*       Pharmacy will continue to follow and monitor vancomycin.    Please contact pharmacy at extension [phone] for questions regarding this assessment.    Thank you for the consult,   Lowell Yates       Patient brief summary:  Allyssa Sood is a 64 y.o. female initiated on antimicrobial therapy with IV Vancomycin for treatment of bacteremia    The patient's current regimen is pulse dose.    Drug Allergies:   Review of patient's allergies indicates:   Allergen Reactions    Lovastatin Dermatitis    Penicillins Shortness Of Breath    Sulfa (sulfonamide antibiotics) Rash       Actual Body Weight:   59kg    Renal Function:   Estimated Creatinine Clearance: 46.6 mL/min (based on SCr of 0.94 mg/dL).,     Dialysis Method (if applicable):  N/A    CBC (last 72 hours):  Recent Labs   Lab Result Units 04/25/24 2259 04/26/24 0314 04/26/24  1221   WBC x10(3)/mcL 9.43 9.19  --    Hgb g/dL 13.5 11.1*  --    Hemoglobin A1c %  --   --  5.2   Hct % 39.7 33.0*  --    Platelet x10(3)/mcL 312 209  --    Mono % % 7.3 2.0  --    Eos % % 2.8 0.1  --    Basophil % % 0.3 0.2  --        Metabolic Panel (last 72 hours):  Recent Labs   Lab Result Units 04/25/24 2259 04/26/24  0314 04/28/24  0431   Sodium Level mmol/L 142 139  --    Potassium Level mmol/L 4.1 3.9  --    Chloride mmol/L 107 109*  --    Carbon Dioxide mmol/L 22* 19*  --    Glucose Level mg/dL 160* 220*  --    Blood Urea Nitrogen mg/dL 17.4 20.1   --    Creatinine mg/dL 1.65* 1.44* 0.94   Albumin Level g/dL 3.9 3.4  --    Bilirubin Total mg/dL 0.4 0.3  --    Alkaline Phosphatase unit/L 114 88  --    Aspartate Aminotransferase unit/L 45* 40*  --    Alanine Aminotransferase unit/L 41 42  --    Magnesium Level mg/dL  --  1.60  --    Phosphorus Level mg/dL  --  2.2*  --        Vancomycin Administrations:  vancomycin given in the last 96 hours                     vancomycin 1.25 g in dextrose 5% 250 mL IVPB (ready to mix) (mg) 1,250 mg New Bag 04/27/24 1253                    Microbiologic Results:  Microbiology Results (last 7 days)       Procedure Component Value Units Date/Time    Blood culture #2 **CANNOT BE ORDERED STAT** [[id number]]  (Abnormal) Collected: 04/26/24 0107    Order Status: Completed Specimen: Blood Updated: 04/28/24 0722     CULTURE, BLOOD ([facility]) Gram-positive coccus probable staph     GRAM STAIN Gram Positive Cocci, probable Staphylococcus      Seen in gram stain of broth only      1 of 2 Aerobic bottles positive    Blood culture #1 **CANNOT BE ORDERED STAT** [[id number]]  (Normal) Collected: 04/26/24 0106    Order Status: Completed Specimen: Blood Updated: 04/28/24 0401     CULTURE, BLOOD ([facility]) No Growth At 48 Hours    BCID2 Panel [[id number]]  (Abnormal) Collected: 04/26/24 0107    Order Status: Completed Specimen: Blood Updated: 04/27/24 1110     CTX-M (ESBL ) N/A     IMP (Cabapenemase ) N/A     KPC resistance gene (Carbapenemase ) N/A     mcr-1 N/A     mecA ID N/A     Comment: Note: Antimicrobial resistance can occur via multiple mechanisms. A Not Detected result for antimicrobial resistance gene(s) does not indicate antimicrobial susceptibility. Subculturing is required for species identification and susceptibility testing of   isolates.        mecA/C and MREJ (MRSA) gene N/A     NDM (Carbapenemase ) N/A     OXA-48-like (Carbapenemase ) N/A     Marlen/B (VRE gene) N/A     VIM (Carbapenemase )  N/A     Enterococcus faecalis Not Detected     Enterococcus faecium Not Detected     Listeria monocytogenes Not Detected     Staphylococcus spp. Detected     Staphylococcus aureus Not Detected     Staphylococcus epidermidis Not Detected     Staphylococcus lugdunensis Not Detected     Streptococcus spp. Not Detected     Streptococcus agalactiae (Group B) Not Detected     Streptococcus pneumoniae Not Detected     Streptococcus pyogenes (Group A) Not Detected     Acinetobacter calcoaceticus/baumannii complex Not Detected     Bacteroides fragilis Not Detected     Enterobacterales Not Detected     Enterobacter cloacae complex Not Detected     Escherichia coli Not Detected     Klebsiella aerogenes Not Detected     Klebsiella oxytoca Not Detected     Klebsiella pneumoniae group Not Detected     Proteus spp. Not Detected     Salmonella spp. Not Detected     Serratia marcescens Not Detected     Haemophilus influenzae Not Detected     Neisseria meningitidis Not Detected     Pseudomonas aeruginosa Not Detected     Stenotrophomonas maltophilia Not Detected     Candida albicans Not Detected     Candida auris Not Detected     Candida glabrata Not Detected     Candida krusei Not Detected     Candida parapsilosis Not Detected     Candida tropicalis Not Detected     Cryptococcus neoformans/gattii Not Detected    Narrative:      The [employer] BCID2 Panel is a multiplexed nucleic acid test intended for the use with Playto® 2.0 or [employer]to® [employer] for the simultaneous qualitative detection and identification of multiple bacterial and yeast nucleic acids and select genetic determinants associated with antimicrobial resistance.  The [employer] BCID2 Panel test is performed directly on blood culture samples identified as positive by a continuous monitoring blood culture system.  Results are intended to be interpreted in conjunction with Gram stain results.

## 2024-04-28 NOTE — PROGRESS NOTES
[facility]  Hospital Medicine Progress Note      Chief Complaint:  Shortness of breath    HPI: (personally reviewed by me and is documented from initial H&P)   64 y.o. White female with a past medical history of hypertension, hyperlipidemia, COPD not on home oxygen and AML in remission for 15 years. The patient presented to [facility] on 4/25/2024 with a primary complaint of shortness of breath and a productive cough with white sputum.  Patient reports cough has been present for the last 2 weeks progressively worsening resulting in shortness of breath.  She was had 1-2 episodes of diarrhea since presentation to hospital.  She denies complaints of fever, chills, abdominal pain, nausea, vomiting and diarrhea.     Upon presentation to the ED, temperature 97.9° F, heart rate 85, blood pressure 136/93, respiratory rate 33 and SpO2 99% on 15 L non-rebreather.  Labs with WBC 9.34, BUN/creatinine 17.4/1.65 (19.7/1.00 in November 2022), glucose 160, , troponin 0.010, lactic acid 2.5.  Influenza a/B and SARS-COV-2 PCR negative.  EKG normal sinus rhythm with nonspecific ST abnormalities.  Chest x-ray with patchy right lung base opacity concerning for possible pneumonia.  In ED patient received DuoNebs, azithromycin, Solu-Medrol, ipratropium and IV fluid hydration.  Patient was oxygen has been weaned down to 2 L nasal cannula.  She is admitted to hospital medicine services for further medical management.    Interval Hx:     Chest x-ray reveals patchy right lung base opacities concerning for pneumonia.    No leukocytosis or neutrophilia on CBC with differential.    Lactic acidosis noted on initial lab work however that has since resolved.    She is non gap acidosis likely secondary to elevated lactic acid.    On admission it was noted that patient had acute kidney injury increase from baseline however downtrending   She also has documented AML in remission for 15 years.   She is independent of all  ADLs and I ADLs.   One of 2 blood cultures positive for Gram-positive Staphylococcus      Update---- nurse notified me that patient was having trouble breathing, I entered patient's room, she was in respiratory distress with inspiratory/ expiratory wheezing noted on auscultation, breathing treatment and IV Benadryl 25 mg was given; abg and chest x-ray was obtained.    Patient seemed to improve more so after IV Benadryl given.  Patient was alert and answering questions appropriately throughout encounter.  Patient's blood gas was obtained at bedside and noted to be significantly acidotic with poor compensation.  Patient has respiratory acidosis, 1 amp of bicarb has been ordered and patient will be initiated on bicarb drip.  Stat labs have been ordered, results are currently pending.   She is currently being treated for pneumonia but with history of COPD she is also receiving IV Solu-Medrol.    Patient was significantly tachycardic and EKG obtained revealed supraventricular tachycardia but after treatment as noted above patient's heart rate decreased to 89 sustained.    Patient states that she is breathing much better and when asked patient tells me that this has happened to her in the past during a hospitalization in that she required intubation at such time.      She is currently on BiPAP, respiratory status stable but guarded.  A repeat ABG ordered for 1 hour post BiPAP placement.      Chest x-ray final read has not resulted however it seems to be slightly improved when compared to most recent.      __________________________________________________________________________________________________________________________________  Objective/physical exam:  Vital signs have been personally reviewed by me   General: Appears comfortable, no acute distress.  Neuro:  Awake alert oriented, answers questions appropriately    Integumentary: Warm, dry, intact.  Musculoskeletal: Purposeful movement noted.     Increased work of  breathing   Cardiovascular: Regular rate.       VITAL SIGNS: 24 HRS MIN & MAX LAST   Temp  Min: 97.3 °F (36.3 °C)  Max: 98.1 °F (36.7 °C) 97.3 °F (36.3 °C)   BP  Min: 123/60  Max: 153/82 (!) 144/79   Pulse  Min: 56  Max: 75  (!) 58   Resp  Min: 20  Max: 21 20   SpO2  Min: 96 %  Max: 100 % 96 %     X-Ray Chest AP Portable  Narrative: EXAMINATION:  XR CHEST AP PORTABLE    CLINICAL HISTORY:  Shortness of breath, cough.    TECHNIQUE:  Single frontal view of the chest was performed.    COMPARISON:  PA and lateral chest radiograph, 05/07/2023.    FINDINGS:  Patchy right lung base opacities.  No pleural effusion.  No pneumothorax.    Cardiomediastinal silhouette is within normal limits for size.  Aortic calcifications are present.    No acute osseous abnormality.  Impression: Patchy right lung base opacities, concerning for possible pneumonia.    Electronically signed by: Adam Allen MD  Date:    04/26/2024  Time:    07:30    Recent Labs   Lab 04/25/24 2259 04/26/24 0314   WBC 9.43 9.19   RBC 4.12* 3.35*   HGB 13.5 11.1*   HCT 39.7 33.0*   MCV 96.4* 98.5*   MCH 32.8* 33.1*   MCHC 34.0 33.6   RDW 13.0 13.1    209   MPV 9.5 9.4       Recent Labs   Lab 04/25/24 2259 04/26/24 0314 04/28/24  0431    139  --    K 4.1 3.9  --    CO2 22* 19*  --    BUN 17.4 20.1  --    CREATININE 1.65* 1.44* 0.94   CALCIUM 8.9 8.5  --    MG  --  1.60  --    ALBUMIN 3.9 3.4  --    ALKPHOS 114 88  --    ALT 41 42  --    AST 45* 40*  --    BILITOT 0.4 0.3  --      Microbiology Results (last 7 days)       Procedure Component Value Units Date/Time    Blood culture #2 **CANNOT BE ORDERED STAT** [[id number]]  (Abnormal) Collected: 04/26/24 0107    Order Status: Completed Specimen: Blood Updated: 04/28/24 1140     CULTURE, BLOOD ([facility]) Staphylococcus capitis     GRAM STAIN Gram Positive Cocci, probable Staphylococcus      Seen in gram stain of broth only      1 of 2 Aerobic bottles positive    Blood culture #1 **CANNOT BE ORDERED  STAT** [[id number]]  (Normal) Collected: 04/26/24 0106    Order Status: Completed Specimen: Blood Updated: 04/28/24 0401     CULTURE, BLOOD ([facility]) No Growth At 48 Hours    BCID2 Panel [[id number]]  (Abnormal) Collected: 04/26/24 0107    Order Status: Completed Specimen: Blood Updated: 04/27/24 1110     CTX-M (ESBL ) N/A     IMP (Cabapenemase ) N/A     KPC resistance gene (Carbapenemase ) N/A     mcr-1 N/A     mecA ID N/A     Comment: Note: Antimicrobial resistance can occur via multiple mechanisms. A Not Detected result for antimicrobial resistance gene(s) does not indicate antimicrobial susceptibility. Subculturing is required for species identification and susceptibility testing of   isolates.        mecA/C and MREJ (MRSA) gene N/A     NDM (Carbapenemase ) N/A     OXA-48-like (Carbapenemase ) N/A     Marlen/B (VRE gene) N/A     VIM (Carbapenemase ) N/A     Enterococcus faecalis Not Detected     Enterococcus faecium Not Detected     Listeria monocytogenes Not Detected     Staphylococcus spp. Detected     Staphylococcus aureus Not Detected     Staphylococcus epidermidis Not Detected     Staphylococcus lugdunensis Not Detected     Streptococcus spp. Not Detected     Streptococcus agalactiae (Group B) Not Detected     Streptococcus pneumoniae Not Detected     Streptococcus pyogenes (Group A) Not Detected     Acinetobacter calcoaceticus/baumannii complex Not Detected     Bacteroides fragilis Not Detected     Enterobacterales Not Detected     Enterobacter cloacae complex Not Detected     Escherichia coli Not Detected     Klebsiella aerogenes Not Detected     Klebsiella oxytoca Not Detected     Klebsiella pneumoniae group Not Detected     Proteus spp. Not Detected     Salmonella spp. Not Detected     Serratia marcescens Not Detected     Haemophilus influenzae Not Detected     Neisseria meningitidis Not Detected     Pseudomonas aeruginosa Not Detected     Stenotrophomonas  maltophilia Not Detected     Candida albicans Not Detected     Candida auris Not Detected     Candida glabrata Not Detected     Candida krusei Not Detected     Candida parapsilosis Not Detected     Candida tropicalis Not Detected     Cryptococcus neoformans/gattii Not Detected    Narrative:      The [facility] BCID2 Panel is a multiplexed nucleic acid test intended for the use with Bio[employer]® FilmArray® 2.0 or BioFire® FilmArray® [employer] for the simultaneous qualitative detection and identification of multiple bacterial and yeast nucleic acids and select genetic determinants associated with antimicrobial resistance.  The [employer] BCID2 Panel test is performed directly on blood culture samples identified as positive by a continuous monitoring blood culture system.  Results are intended to be interpreted in conjunction with Gram stain results.             Scheduled Med:  Current Facility-Administered Medications   Medication Dose Route Frequency    albuterol-ipratropium  3 mL Nebulization Q12H    azithromycin  500 mg Intravenous Q24H    cefTRIAXone (Rocephin) IV (PEDS and ADULTS)  1 g Intravenous Q24H    enoxparin  30 mg Subcutaneous Daily    gabapentin  800 mg Oral BID    hydrOXYzine pamoate  25 mg Oral Daily    hydrOXYzine pamoate  50 mg Oral QHS    lisinopriL  10 mg Oral Daily    methylPREDNISolone sodium succinate injection  40 mg Intravenous Q12H    metoprolol succinate  25 mg Oral Daily    pantoprazole  40 mg Oral Daily    paroxetine  40 mg Oral Daily    vancomycin (VANCOCIN) IV (PEDS and ADULTS)  1,250 mg Intravenous Q24H      Continuous Infusions:  Current Facility-Administered Medications   Medication Dose Route Frequency Last Rate Last Admin    lactated ringers   Intravenous Continuous 75 mL/hr at 04/26/24 0336 New Bag at 04/26/24 0336        PRN Meds:    Current Facility-Administered Medications:     acetaminophen, 650 mg, Oral, Q6H PRN    albuterol-ipratropium, 3 mL, Nebulization, Q4H PRN     aluminum-magnesium hydroxide-simethicone, 30 mL, Oral, QID PRN    budesonide, 0.25 mg, Nebulization, Daily PRN    guaiFENesin 100 mg/5 ml, 200 mg, Oral, Q4H PRN    melatonin, 6 mg, Oral, Nightly PRN    naloxone, 0.02 mg, Intravenous, PRN    ondansetron, 4 mg, Intravenous, Q4H PRN    polyethylene glycol, 17 g, Oral, BID PRN    prochlorperazine, 5 mg, Intravenous, Q6H PRN    simethicone, 1 tablet, Oral, QID PRN    vancomycin - pharmacy to dose, , Intravenous, pharmacy to manage frequency   __________________________________________________________________________________________________________________________________  Assessment/Plan:  Community-acquired pneumonia  AML (acute myeloid leukemia) in remission  COPD (chronic obstructive pulmonary disease)  Hyperlipidemia  Hypertension  Neuropathy      Above present on admission     Anticipated discharge and Disposition when medically stable:    _______________________________________________________________________________________________________________________________  Patient presents for shortness of breath currently being treated for pneumonia with azithromycin and Rocephin.    Follow-up on results of cultures.     One of 2 blood cultures positive for Gram-positive Staphylococcus; will broaden patient's antibiotic coverage with vancomycin, pharmacy to dose.  Repeat blood cultures in 24 hours post initiation of vancomycin.  Continue supportive care  Continue checking vital signs q4hrs.    Reviewed and restarted appropriate home medications.   Nurse notified to page me if any changes occur     DVT prophylaxis initiated   Nutrition Status:  Heart healthy diet  _______________________________________________________________________________________________________________________________    This is a critical care document  Critical Care Diagnosis:  Hypoxic respiratory failure requiring supplemental oxygen therapy  Critical care interventions: hands on evaluation, review  of labs/radiographs/records and discussions; assessing and managing the high probability of imminent or life-threatening deterioration of cardiorespiratory status.  Critical care time spent 75 minutes.     Prolong care documentation  Advanced care planning discussed.     I have spent >30 minutes on the day of the visit; time spent includes face to face time and non-face to face time preparing to see the patient (eg, review of tests), independently reviewing and interpreting medical records, both past and current; documenting clinical information in the electronic or other health record, and communicating results to the patient/family/caregiver and care coordinator and nursing team.      All diagnosis and differential diagnosis have been reviewed,  interpreted and communicated appropriately to care team. assessment and plan has been documented; I have personally reviewed the labs and test results that are presently available and pertinent to this hospital course; I have reviewed medical records based upon their availability.    All of the patient's questions have been  addressed and answered. Patient's is agreeable to the above stated plan.   I will continue to monitor closely and make adjustments to medical management as needed.    Noris Horton,    04/28/2024     This note was created with the assistance of Dragon voice recognition software. There may be transcription errors as a result of using this technology however minimal. Effort has been made to assure accuracy of transcription but any obvious errors or omissions should be clarified with the author of the document.

## 2024-04-28 NOTE — NURSING
RRT RN arrived in room 967 in 1615. Dr. Horton was at bedside with RN staff members. Pt is AAOx's 4 with c/o SOB with increased work of breathing and wheezing. Noted that patient's neck, chest, and arms are red. Primary RN reports that pt's vanc dose finished 20 minutes ago.     Neb in progress per RT. CXR, ABG, labs, EKG ordered per Dr. Horton.     Benadryl given 1619.     1627: Neb complete, redness gone. Pt reports less SOB. Appears more relaxed. Less wheezing noted and RR 20s (down from 30s+).     EKG complete at reviewed by Dr. Horton 1632.    ABG reviewed by Dr. Horton 1640. Md wants to give 1 amp of bicarb, start bipap, and repeat ABG in 1 hour.     Bipap started per MD orders. Pt reports she has had an episode like this in the past that required intubation but she doesn't know what caused that episode.     1651: Pt resting comfortably on bipap; states SOB improved. IVF remain infusing, pt is making urine. Primary RN instructed to call with any further needs.       1834: RN went to floor to check on pt. Pt AAOx4, texting on phone, reports that she feels much better. HR 62, RR 24, bp 139/69, and O2 sat 98%. ABG to be drawn shortly.

## 2024-04-29 LAB
ANION GAP SERPL CALC-SCNC: 7 MEQ/L
BACTERIA BLD CULT: ABNORMAL
BASOPHILS # BLD AUTO: 0 X10(3)/MCL
BASOPHILS NFR BLD AUTO: 0 %
BUN SERPL-MCNC: 29.5 MG/DL (ref 9.8–20.1)
CALCIUM SERPL-MCNC: 8.4 MG/DL (ref 8.4–10.2)
CHLORIDE SERPL-SCNC: 106 MMOL/L (ref 98–107)
CO2 SERPL-SCNC: 26 MMOL/L (ref 23–31)
CREAT SERPL-MCNC: 0.98 MG/DL (ref 0.55–1.02)
CREAT/UREA NIT SERPL: 30
EOSINOPHIL # BLD AUTO: 0 X10(3)/MCL (ref 0–0.9)
EOSINOPHIL NFR BLD AUTO: 0 %
ERYTHROCYTE [DISTWIDTH] IN BLOOD BY AUTOMATED COUNT: 13.3 % (ref 11.5–17)
GFR SERPLBLD CREATININE-BSD FMLA CKD-EPI: >60 MLS/MIN/1.73/M2
GLUCOSE SERPL-MCNC: 128 MG/DL (ref 82–115)
GRAM STN SPEC: ABNORMAL
HCT VFR BLD AUTO: 30.8 % (ref 37–47)
HGB BLD-MCNC: 10.4 G/DL (ref 12–16)
IMM GRANULOCYTES # BLD AUTO: 0.03 X10(3)/MCL (ref 0–0.04)
IMM GRANULOCYTES NFR BLD AUTO: 0.4 %
LYMPHOCYTES # BLD AUTO: 0.86 X10(3)/MCL (ref 0.6–4.6)
LYMPHOCYTES NFR BLD AUTO: 12.6 %
MAGNESIUM SERPL-MCNC: 2 MG/DL (ref 1.6–2.6)
MCH RBC QN AUTO: 33.2 PG (ref 27–31)
MCHC RBC AUTO-ENTMCNC: 33.8 G/DL (ref 33–36)
MCV RBC AUTO: 98.4 FL (ref 80–94)
MONOCYTES # BLD AUTO: 0.31 X10(3)/MCL (ref 0.1–1.3)
MONOCYTES NFR BLD AUTO: 4.6 %
NEUTROPHILS # BLD AUTO: 5.6 X10(3)/MCL (ref 2.1–9.2)
NEUTROPHILS NFR BLD AUTO: 82.4 %
NRBC BLD AUTO-RTO: 0 %
PHOSPHATE SERPL-MCNC: 3.2 MG/DL (ref 2.3–4.7)
PLATELET # BLD AUTO: 184 X10(3)/MCL (ref 130–400)
PMV BLD AUTO: 9.9 FL (ref 7.4–10.4)
POTASSIUM SERPL-SCNC: 4.6 MMOL/L (ref 3.5–5.1)
RBC # BLD AUTO: 3.13 X10(6)/MCL (ref 4.2–5.4)
SODIUM SERPL-SCNC: 139 MMOL/L (ref 136–145)
WBC # SPEC AUTO: 6.8 X10(3)/MCL (ref 4.5–11.5)

## 2024-04-29 PROCEDURE — 25000242 PHARM REV CODE 250 ALT 637 W/ HCPCS: Performed by: STUDENT IN AN ORGANIZED HEALTH CARE EDUCATION/TRAINING PROGRAM

## 2024-04-29 PROCEDURE — 80048 BASIC METABOLIC PNL TOTAL CA: CPT | Performed by: STUDENT IN AN ORGANIZED HEALTH CARE EDUCATION/TRAINING PROGRAM

## 2024-04-29 PROCEDURE — 25000003 PHARM REV CODE 250: Performed by: NURSE PRACTITIONER

## 2024-04-29 PROCEDURE — 21400001 HC TELEMETRY ROOM

## 2024-04-29 PROCEDURE — 99900031 HC PATIENT EDUCATION (STAT)

## 2024-04-29 PROCEDURE — 99900035 HC TECH TIME PER 15 MIN (STAT)

## 2024-04-29 PROCEDURE — 63600175 PHARM REV CODE 636 W HCPCS: Performed by: NURSE PRACTITIONER

## 2024-04-29 PROCEDURE — 27100171 HC OXYGEN HIGH FLOW UP TO 24 HOURS

## 2024-04-29 PROCEDURE — 94640 AIRWAY INHALATION TREATMENT: CPT

## 2024-04-29 PROCEDURE — 94760 N-INVAS EAR/PLS OXIMETRY 1: CPT

## 2024-04-29 PROCEDURE — 36415 COLL VENOUS BLD VENIPUNCTURE: CPT | Performed by: STUDENT IN AN ORGANIZED HEALTH CARE EDUCATION/TRAINING PROGRAM

## 2024-04-29 PROCEDURE — 94761 N-INVAS EAR/PLS OXIMETRY MLT: CPT

## 2024-04-29 PROCEDURE — 85025 COMPLETE CBC W/AUTO DIFF WBC: CPT | Performed by: STUDENT IN AN ORGANIZED HEALTH CARE EDUCATION/TRAINING PROGRAM

## 2024-04-29 PROCEDURE — 63600175 PHARM REV CODE 636 W HCPCS: Performed by: PHYSICIAN ASSISTANT

## 2024-04-29 PROCEDURE — 25000003 PHARM REV CODE 250: Performed by: PHYSICIAN ASSISTANT

## 2024-04-29 PROCEDURE — 25000003 PHARM REV CODE 250: Performed by: STUDENT IN AN ORGANIZED HEALTH CARE EDUCATION/TRAINING PROGRAM

## 2024-04-29 PROCEDURE — 63600175 PHARM REV CODE 636 W HCPCS: Performed by: STUDENT IN AN ORGANIZED HEALTH CARE EDUCATION/TRAINING PROGRAM

## 2024-04-29 PROCEDURE — 83735 ASSAY OF MAGNESIUM: CPT | Performed by: STUDENT IN AN ORGANIZED HEALTH CARE EDUCATION/TRAINING PROGRAM

## 2024-04-29 PROCEDURE — 84100 ASSAY OF PHOSPHORUS: CPT | Performed by: STUDENT IN AN ORGANIZED HEALTH CARE EDUCATION/TRAINING PROGRAM

## 2024-04-29 PROCEDURE — 94660 CPAP INITIATION&MGMT: CPT

## 2024-04-29 RX ORDER — GUAIFENESIN 600 MG/1
600 TABLET, EXTENDED RELEASE ORAL 2 TIMES DAILY
Status: DISCONTINUED | OUTPATIENT
Start: 2024-04-29 | End: 2024-05-03 | Stop reason: HOSPADM

## 2024-04-29 RX ORDER — DOXYCYCLINE HYCLATE 100 MG
100 TABLET ORAL EVERY 12 HOURS
Status: DISCONTINUED | OUTPATIENT
Start: 2024-04-29 | End: 2024-05-03 | Stop reason: HOSPADM

## 2024-04-29 RX ORDER — BENZONATATE 100 MG/1
100 CAPSULE ORAL 2 TIMES DAILY
Status: DISCONTINUED | OUTPATIENT
Start: 2024-04-29 | End: 2024-05-03 | Stop reason: HOSPADM

## 2024-04-29 RX ORDER — ENOXAPARIN SODIUM 100 MG/ML
40 INJECTION SUBCUTANEOUS EVERY 24 HOURS
Status: DISCONTINUED | OUTPATIENT
Start: 2024-04-29 | End: 2024-05-03 | Stop reason: HOSPADM

## 2024-04-29 RX ADMIN — HYDROXYZINE PAMOATE 25 MG: 25 CAPSULE ORAL at 09:04

## 2024-04-29 RX ADMIN — GUAIFENESIN 600 MG: 600 TABLET, EXTENDED RELEASE ORAL at 09:04

## 2024-04-29 RX ADMIN — VANCOMYCIN HYDROCHLORIDE 1250 MG: 1.25 INJECTION, POWDER, LYOPHILIZED, FOR SOLUTION INTRAVENOUS at 12:04

## 2024-04-29 RX ADMIN — IPRATROPIUM BROMIDE AND ALBUTEROL SULFATE 3 ML: 2.5; .5 SOLUTION RESPIRATORY (INHALATION) at 09:04

## 2024-04-29 RX ADMIN — HYDROXYZINE PAMOATE 50 MG: 50 CAPSULE ORAL at 09:04

## 2024-04-29 RX ADMIN — DOXYCYCLINE HYCLATE 100 MG: 100 TABLET, COATED ORAL at 09:04

## 2024-04-29 RX ADMIN — ENOXAPARIN SODIUM 40 MG: 40 INJECTION SUBCUTANEOUS at 09:04

## 2024-04-29 RX ADMIN — PAROXETINE HYDROCHLORIDE 40 MG: 20 TABLET, FILM COATED ORAL at 09:04

## 2024-04-29 RX ADMIN — GABAPENTIN 800 MG: 400 CAPSULE ORAL at 09:04

## 2024-04-29 RX ADMIN — SODIUM BICARBONATE 650 MG TABLET 650 MG: at 09:04

## 2024-04-29 RX ADMIN — AZITHROMYCIN MONOHYDRATE 500 MG: 500 INJECTION, POWDER, LYOPHILIZED, FOR SOLUTION INTRAVENOUS at 12:04

## 2024-04-29 RX ADMIN — METHYLPREDNISOLONE SODIUM SUCCINATE 40 MG: 40 INJECTION, POWDER, FOR SOLUTION INTRAMUSCULAR; INTRAVENOUS at 09:04

## 2024-04-29 RX ADMIN — PANTOPRAZOLE SODIUM 40 MG: 40 TABLET, DELAYED RELEASE ORAL at 09:04

## 2024-04-29 RX ADMIN — LISINOPRIL 10 MG: 10 TABLET ORAL at 09:04

## 2024-04-29 RX ADMIN — IPRATROPIUM BROMIDE AND ALBUTEROL SULFATE 3 ML: 2.5; .5 SOLUTION RESPIRATORY (INHALATION) at 08:04

## 2024-04-29 RX ADMIN — CEFTRIAXONE SODIUM 1 G: 1 INJECTION, POWDER, FOR SOLUTION INTRAMUSCULAR; INTRAVENOUS at 09:04

## 2024-04-29 RX ADMIN — METOPROLOL SUCCINATE 25 MG: 25 TABLET, EXTENDED RELEASE ORAL at 09:04

## 2024-04-29 RX ADMIN — IPRATROPIUM BROMIDE AND ALBUTEROL SULFATE 3 ML: 2.5; .5 SOLUTION RESPIRATORY (INHALATION) at 02:04

## 2024-04-29 RX ADMIN — IPRATROPIUM BROMIDE AND ALBUTEROL SULFATE 3 ML: 2.5; .5 SOLUTION RESPIRATORY (INHALATION) at 01:04

## 2024-04-29 RX ADMIN — BENZONATATE 100 MG: 100 CAPSULE ORAL at 09:04

## 2024-04-29 NOTE — PROGRESS NOTES
[facility]  Hospital Medicine Progress Note      Chief Complaint:  Shortness of breath    HPI: (personally reviewed by me and is documented from initial H&P)   64 y.o. White female with a past medical history of hypertension, hyperlipidemia, COPD not on home oxygen and AML in remission for 15 years. The patient presented to [facility] on 4/25/2024 with a primary complaint of shortness of breath and a productive cough with white sputum.  Patient reports cough has been present for the last 2 weeks progressively worsening resulting in shortness of breath.  She was had 1-2 episodes of diarrhea since presentation to hospital.  She denies complaints of fever, chills, abdominal pain, nausea, vomiting and diarrhea.     Upon presentation to the ED, temperature 97.9° F, heart rate 85, blood pressure 136/93, respiratory rate 33 and SpO2 99% on 15 L non-rebreather.  Labs with WBC 9.34, BUN/creatinine 17.4/1.65 (19.7/1.00 in November 2022), glucose 160, , troponin 0.010, lactic acid 2.5.  Influenza a/B and SARS-COV-2 PCR negative.  EKG normal sinus rhythm with nonspecific ST abnormalities.  Chest x-ray with patchy right lung base opacity concerning for possible pneumonia.  In ED patient received DuoNebs, azithromycin, Solu-Medrol, ipratropium and IV fluid hydration.  Patient was oxygen has been weaned down to 2 L nasal cannula.  She is admitted to hospital medicine services for further medical management.    Interval Hx:     No overnight events.  No new complaints.  Patient states that she is feeling much better today.    Still with supplemental oxygen in place in requiring breathing treatments but no inspiratory expiratory wheezing and no worsening cough.    Patient again states that it past she has had significant cough she has had episodes respiratory failure requiring  intubation.  __________________________________________________________________________________________________________________________________  Objective/physical exam:  Vital signs have been personally reviewed by me   General: Appears comfortable, no acute distress.  Neuro:  Awake alert oriented, answers questions appropriately    Integumentary: Warm, dry, intact.  Musculoskeletal: Purposeful movement noted.     Increased work of breathing   Cardiovascular: Regular rate.       VITAL SIGNS: 24 HRS MIN & MAX LAST   Temp  Min: 96.4 °F (35.8 °C)  Max: 98.1 °F (36.7 °C) 96.4 °F (35.8 °C)   BP  Min: 128/72  Max: 167/94 (!) 153/79   Pulse  Min: 59  Max: 123  60   Resp  Min: 18  Max: 28 18   SpO2  Min: 30 %  Max: 98 % 98 %     X-Ray Chest 1 View  Narrative: EXAMINATION:  XR CHEST 1 VIEW    CLINICAL HISTORY:  dyspnea;    TECHNIQUE:  Single view of the chest    COMPARISON:  04/25/2024    FINDINGS:  Prominent interstitial markings with no focal opacification.  No pleural effusion or pneumothorax.    The cardiomediastinal silhouette is within normal limits.    No acute osseous abnormality.  Impression: No acute cardiopulmonary process.  Prominent interstitial markings throughout increased in the interval.    Electronically signed by: Caleb Martinez  Date:    04/28/2024  Time:    16:41    Recent Labs   Lab 04/26/24  0314 04/28/24  1633 04/29/24  0541   WBC 9.19 16.66* 6.80   RBC 3.35* 3.77* 3.13*   HGB 11.1* 12.7 10.4*   HCT 33.0* 38.0 30.8*   MCV 98.5* 100.8* 98.4*   MCH 33.1* 33.7* 33.2*   MCHC 33.6 33.4 33.8   RDW 13.1 13.8 13.3    303 184   MPV 9.4 10.3 9.9       Recent Labs   Lab 04/25/24  2259 04/26/24  0314 04/28/24  0431 04/28/24  1627 04/28/24  1633 04/28/24  1835 04/29/24  0541    139  --   --  139  --  139   K 4.1 3.9  --   --  4.6  --  4.6   CO2 22* 19*  --   --  15*  --  26   BUN 17.4 20.1  --   --  26.8*  --  29.5*   CREATININE 1.65* 1.44* 0.94  --  1.27*  --  0.98   CALCIUM 8.9 8.5  --   --  8.7   --  8.4   PH  --   --   --  7.090*  --  7.450  --    MG  --  1.60  --   --   --   --  2.00   ALBUMIN 3.9 3.4  --   --  3.6  --   --    ALKPHOS 114 88  --   --  100  --   --    ALT 41 42  --   --  388*  --   --    AST 45* 40*  --   --  192*  --   --    BILITOT 0.4 0.3  --   --  0.4  --   --      Microbiology Results (last 7 days)       Procedure Component Value Units Date/Time    Blood culture #2 **CANNOT BE ORDERED STAT** [[id number]]  (Abnormal)  (Susceptibility) Collected: 04/26/24 0107    Order Status: Completed Specimen: Blood Updated: 04/29/24 0637     CULTURE, BLOOD ([facility]) Staphylococcus capitis     GRAM STAIN Gram Positive Cocci, probable Staphylococcus      Seen in gram stain of broth only      1 of 2 Aerobic bottles positive    Blood culture #1 **CANNOT BE ORDERED STAT** [[id number]]  (Normal) Collected: 04/26/24 0106    Order Status: Completed Specimen: Blood Updated: 04/29/24 0407     CULTURE, BLOOD ([facility]) No Growth At 72 Hours    BCID2 Panel [[id number]]  (Abnormal) Collected: 04/26/24 0107    Order Status: Completed Specimen: Blood Updated: 04/27/24 1110     CTX-M (ESBL ) N/A     IMP (Cabapenemase ) N/A     KPC resistance gene (Carbapenemase ) N/A     mcr-1 N/A     mecA ID N/A     Comment: Note: Antimicrobial resistance can occur via multiple mechanisms. A Not Detected result for antimicrobial resistance gene(s) does not indicate antimicrobial susceptibility. Subculturing is required for species identification and susceptibility testing of   isolates.        mecA/C and MREJ (MRSA) gene N/A     NDM (Carbapenemase ) N/A     OXA-48-like (Carbapenemase ) N/A     Marlen/B (VRE gene) N/A     VIM (Carbapenemase ) N/A     Enterococcus faecalis Not Detected     Enterococcus faecium Not Detected     Listeria monocytogenes Not Detected     Staphylococcus spp. Detected     Staphylococcus aureus Not Detected     Staphylococcus epidermidis Not Detected     Staphylococcus  lugdunensis Not Detected     Streptococcus spp. Not Detected     Streptococcus agalactiae (Group B) Not Detected     Streptococcus pneumoniae Not Detected     Streptococcus pyogenes (Group A) Not Detected     Acinetobacter calcoaceticus/baumannii complex Not Detected     Bacteroides fragilis Not Detected     Enterobacterales Not Detected     Enterobacter cloacae complex Not Detected     Escherichia coli Not Detected     Klebsiella aerogenes Not Detected     Klebsiella oxytoca Not Detected     Klebsiella pneumoniae group Not Detected     Proteus spp. Not Detected     Salmonella spp. Not Detected     Serratia marcescens Not Detected     Haemophilus influenzae Not Detected     Neisseria meningitidis Not Detected     Pseudomonas aeruginosa Not Detected     Stenotrophomonas maltophilia Not Detected     Candida albicans Not Detected     Candida auris Not Detected     Candida glabrata Not Detected     Candida krusei Not Detected     Candida parapsilosis Not Detected     Candida tropicalis Not Detected     Cryptococcus neoformans/gattii Not Detected    Narrative:      The [employer] BCID2 Panel is a multiplexed nucleic acid test intended for the use with Micromuscle® 2.0 or Micromuscle® [employer] for the simultaneous qualitative detection and identification of multiple bacterial and yeast nucleic acids and select genetic determinants associated with antimicrobial resistance.  The [employer] BCID2 Panel test is performed directly on blood culture samples identified as positive by a continuous monitoring blood culture system.  Results are intended to be interpreted in conjunction with Gram stain results.             Scheduled Med:  Current Facility-Administered Medications   Medication Dose Route Frequency    albuterol-ipratropium  3 mL Nebulization Q6H    doxycycline  100 mg Oral Q12H    enoxparin  40 mg Subcutaneous Daily    gabapentin  800 mg Oral BID    hydrOXYzine pamoate  25 mg Oral Daily    hydrOXYzine  pamoate  50 mg Oral QHS    lisinopriL  10 mg Oral Daily    methylPREDNISolone sodium succinate injection  40 mg Intravenous Q12H    metoprolol succinate  25 mg Oral Daily    pantoprazole  40 mg Oral Daily    paroxetine  40 mg Oral Daily    sodium bicarbonate  650 mg Oral BID      Continuous Infusions:  Current Facility-Administered Medications   Medication Dose Route Frequency Last Rate Last Admin        PRN Meds:    Current Facility-Administered Medications:     acetaminophen, 650 mg, Oral, Q6H PRN    albuterol-ipratropium, 3 mL, Nebulization, Q4H PRN    aluminum-magnesium hydroxide-simethicone, 30 mL, Oral, QID PRN    budesonide, 0.25 mg, Nebulization, Daily PRN    guaiFENesin 100 mg/5 ml, 200 mg, Oral, Q4H PRN    melatonin, 6 mg, Oral, Nightly PRN    naloxone, 0.02 mg, Intravenous, PRN    ondansetron, 4 mg, Intravenous, Q4H PRN    polyethylene glycol, 17 g, Oral, BID PRN    prochlorperazine, 5 mg, Intravenous, Q6H PRN    simethicone, 1 tablet, Oral, QID PRN    vancomycin - pharmacy to dose, , Intravenous, pharmacy to manage frequency   __________________________________________________________________________________________________________________________________  Assessment/Plan:  Community-acquired pneumonia  AML (acute myeloid leukemia) in remission  COPD (chronic obstructive pulmonary disease)  Hyperlipidemia  Hypertension  Neuropathy      Above present on admission     Anticipated discharge and Disposition when medically stable:  Discharge in 24-48 hours  _______________________________________________________________________________________________________________________________  Patient presents for shortness of breath currently being treated for pneumonia and possible COPD exacerbation.  Continue IV steroid therapy for now.    Add Tessalon Perles and Mucinex.  Discontinue IV antibiotic therapy initiate doxycycline.    Follow-up with repeat blood culture results.    One of 2 blood cultures positive for  Staph capitis which is likely a contaminant but will follow up appropriately.    Continue breathing treatment.  That is the leg continue sodium bicarbonate supplementation.    Repeat blood chemistry in the a.m..  Continue supportive care  Continue checking vital signs q4hrs.    Reviewed and restarted appropriate home medications.   Nurse notified to page me if any changes occur     DVT prophylaxis initiated   Nutrition Status:  Heart healthy diet  _______________________________________________________________________________________________________________________________    This is a critical care document  Critical Care Diagnosis:  Hypoxic respiratory failure requiring supplemental oxygen therapy  Critical care interventions: hands on evaluation, review of labs/radiographs/records and discussions; assessing and managing the high probability of imminent or life-threatening deterioration of cardiorespiratory status.  Critical care time spent 75 minutes.     I have spent >30 minutes on the day of the visit; time spent includes face to face time and non-face to face time preparing to see the patient (eg, review of tests), independently reviewing and interpreting medical records, both past and current; documenting clinical information in the electronic or other health record, and communicating results to the patient/family/caregiver and care coordinator and nursing team.      All diagnosis and differential diagnosis have been reviewed,  interpreted and communicated appropriately to care team. assessment and plan has been documented; I have personally reviewed the labs and test results that are presently available and pertinent to this hospital course; I have reviewed medical records based upon their availability.    All of the patient's questions have been  addressed and answered. Patient's is agreeable to the above stated plan.   I will continue to monitor closely and make adjustments to medical management as  needed.    Noris Horton,    04/29/2024     This note was created with the assistance of Dragon voice recognition software. There may be transcription errors as a result of using this technology however minimal. Effort has been made to assure accuracy of transcription but any obvious errors or omissions should be clarified with the author of the document.

## 2024-04-29 NOTE — NURSING
Nurses Note -- 4 Eyes      4/29/2024   10:55 AM      Skin assessed during: Admit      [x] No Altered Skin Integrity Present    []Prevention Measures Documented      [] Yes- Altered Skin Integrity Present or Discovered   [] LDA Added if Not in Epic (Describe Wound)   [] New Altered Skin Integrity was Present on Admit and Documented in LDA   [] Wound Image Taken    Wound Care Consulted? No    Attending Nurse:  Demetrice Flynn RN    Second RN/Staff Member:   Nely Quintero RN

## 2024-04-30 PROCEDURE — 25000242 PHARM REV CODE 250 ALT 637 W/ HCPCS: Performed by: STUDENT IN AN ORGANIZED HEALTH CARE EDUCATION/TRAINING PROGRAM

## 2024-04-30 PROCEDURE — 63600175 PHARM REV CODE 636 W HCPCS: Performed by: STUDENT IN AN ORGANIZED HEALTH CARE EDUCATION/TRAINING PROGRAM

## 2024-04-30 PROCEDURE — 94760 N-INVAS EAR/PLS OXIMETRY 1: CPT

## 2024-04-30 PROCEDURE — 99900035 HC TECH TIME PER 15 MIN (STAT)

## 2024-04-30 PROCEDURE — 25000003 PHARM REV CODE 250: Performed by: NURSE PRACTITIONER

## 2024-04-30 PROCEDURE — 27000221 HC OXYGEN, UP TO 24 HOURS

## 2024-04-30 PROCEDURE — 94640 AIRWAY INHALATION TREATMENT: CPT

## 2024-04-30 PROCEDURE — 63600175 PHARM REV CODE 636 W HCPCS: Performed by: NURSE PRACTITIONER

## 2024-04-30 PROCEDURE — 99900031 HC PATIENT EDUCATION (STAT)

## 2024-04-30 PROCEDURE — 25000003 PHARM REV CODE 250: Performed by: STUDENT IN AN ORGANIZED HEALTH CARE EDUCATION/TRAINING PROGRAM

## 2024-04-30 PROCEDURE — 21400001 HC TELEMETRY ROOM

## 2024-04-30 RX ADMIN — DOXYCYCLINE HYCLATE 100 MG: 100 TABLET, COATED ORAL at 09:04

## 2024-04-30 RX ADMIN — GUAIFENESIN 600 MG: 600 TABLET, EXTENDED RELEASE ORAL at 09:04

## 2024-04-30 RX ADMIN — ENOXAPARIN SODIUM 40 MG: 40 INJECTION SUBCUTANEOUS at 08:04

## 2024-04-30 RX ADMIN — IPRATROPIUM BROMIDE AND ALBUTEROL SULFATE 3 ML: 2.5; .5 SOLUTION RESPIRATORY (INHALATION) at 02:04

## 2024-04-30 RX ADMIN — GUAIFENESIN 600 MG: 600 TABLET, EXTENDED RELEASE ORAL at 08:04

## 2024-04-30 RX ADMIN — HYDROXYZINE PAMOATE 50 MG: 50 CAPSULE ORAL at 08:04

## 2024-04-30 RX ADMIN — METOPROLOL SUCCINATE 25 MG: 25 TABLET, EXTENDED RELEASE ORAL at 09:04

## 2024-04-30 RX ADMIN — IPRATROPIUM BROMIDE AND ALBUTEROL SULFATE 3 ML: 2.5; .5 SOLUTION RESPIRATORY (INHALATION) at 01:04

## 2024-04-30 RX ADMIN — PANTOPRAZOLE SODIUM 40 MG: 40 TABLET, DELAYED RELEASE ORAL at 09:04

## 2024-04-30 RX ADMIN — HYDROXYZINE PAMOATE 25 MG: 25 CAPSULE ORAL at 09:04

## 2024-04-30 RX ADMIN — DOXYCYCLINE HYCLATE 100 MG: 100 TABLET, COATED ORAL at 08:04

## 2024-04-30 RX ADMIN — IPRATROPIUM BROMIDE AND ALBUTEROL SULFATE 3 ML: 2.5; .5 SOLUTION RESPIRATORY (INHALATION) at 08:04

## 2024-04-30 RX ADMIN — BENZONATATE 100 MG: 100 CAPSULE ORAL at 08:04

## 2024-04-30 RX ADMIN — ACETAMINOPHEN 650 MG: 325 TABLET, FILM COATED ORAL at 03:04

## 2024-04-30 RX ADMIN — BENZONATATE 100 MG: 100 CAPSULE ORAL at 09:04

## 2024-04-30 RX ADMIN — METHYLPREDNISOLONE SODIUM SUCCINATE 40 MG: 40 INJECTION, POWDER, FOR SOLUTION INTRAMUSCULAR; INTRAVENOUS at 09:04

## 2024-04-30 RX ADMIN — SODIUM BICARBONATE 650 MG TABLET 650 MG: at 09:04

## 2024-04-30 RX ADMIN — GABAPENTIN 800 MG: 400 CAPSULE ORAL at 08:04

## 2024-04-30 RX ADMIN — LISINOPRIL 10 MG: 10 TABLET ORAL at 09:04

## 2024-04-30 RX ADMIN — PAROXETINE HYDROCHLORIDE 40 MG: 20 TABLET, FILM COATED ORAL at 09:04

## 2024-04-30 RX ADMIN — GABAPENTIN 800 MG: 400 CAPSULE ORAL at 09:04

## 2024-04-30 RX ADMIN — PREDNISONE 60 MG: 10 TABLET ORAL at 03:04

## 2024-04-30 NOTE — PROGRESS NOTES
[facility]  Hospital Medicine Progress Note      Chief Complaint:  Shortness of breath    HPI: (personally reviewed by me and is documented from initial H&P)   64 y.o. White female with a past medical history of hypertension, hyperlipidemia, COPD not on home oxygen and AML in remission for 15 years. The patient presented to [facility] on 4/25/2024 with a primary complaint of shortness of breath and a productive cough with white sputum.  Patient reports cough has been present for the last 2 weeks progressively worsening resulting in shortness of breath.  She was had 1-2 episodes of diarrhea since presentation to hospital.  She denies complaints of fever, chills, abdominal pain, nausea, vomiting and diarrhea.     Upon presentation to the ED, temperature 97.9° F, heart rate 85, blood pressure 136/93, respiratory rate 33 and SpO2 99% on 15 L non-rebreather.  Labs with WBC 9.34, BUN/creatinine 17.4/1.65 (19.7/1.00 in November 2022), glucose 160, , troponin 0.010, lactic acid 2.5.  Influenza a/B and SARS-COV-2 PCR negative.  EKG normal sinus rhythm with nonspecific ST abnormalities.  Chest x-ray with patchy right lung base opacity concerning for possible pneumonia.  In ED patient received DuoNebs, azithromycin, Solu-Medrol, ipratropium and IV fluid hydration.  Patient was oxygen has been weaned down to 2 L nasal cannula.  She is admitted to hospital medicine services for further medical management.    Interval Hx:   Discussed plan of care with nurse    Patient still requiring supplemental oxygen at this time with noted shortness of breaths and continue cough.    Patient tells me that the Mucinex and Tessalon Perles are helping her cough.    __________________________________________________________________________________________________________________________________    Objective/physical exam:  Vital signs have been personally reviewed by me   General: Appears comfortable, no acute  distress.  Neuro:  Awake alert oriented, answers questions appropriately    Integumentary: Warm, dry, intact.  Musculoskeletal: Purposeful movement noted.     Increased work of breathing   Cardiovascular: Regular rate.       VITAL SIGNS: 24 HRS MIN & MAX LAST   Temp  Min: 97.5 °F (36.4 °C)  Max: 98.5 °F (36.9 °C) 97.5 °F (36.4 °C)   BP  Min: 129/70  Max: 166/76 (!) 166/76   Pulse  Min: 59  Max: 73  73   Resp  Min: 18  Max: 22 (!) 22   SpO2  Min: 91 %  Max: 98 % (!) 94 %     X-Ray Chest 1 View  Narrative: EXAMINATION:  XR CHEST 1 VIEW    CLINICAL HISTORY:  dyspnea;    TECHNIQUE:  Single view of the chest    COMPARISON:  04/25/2024    FINDINGS:  Prominent interstitial markings with no focal opacification.  No pleural effusion or pneumothorax.    The cardiomediastinal silhouette is within normal limits.    No acute osseous abnormality.  Impression: No acute cardiopulmonary process.  Prominent interstitial markings throughout increased in the interval.    Electronically signed by: Caleb Martinez  Date:    04/28/2024  Time:    16:41    Recent Labs   Lab 04/26/24 0314 04/28/24  1633 04/29/24  0541   WBC 9.19 16.66* 6.80   RBC 3.35* 3.77* 3.13*   HGB 11.1* 12.7 10.4*   HCT 33.0* 38.0 30.8*   MCV 98.5* 100.8* 98.4*   MCH 33.1* 33.7* 33.2*   MCHC 33.6 33.4 33.8   RDW 13.1 13.8 13.3    303 184   MPV 9.4 10.3 9.9       Recent Labs   Lab 04/25/24  2259 04/26/24  0314 04/28/24  0431 04/28/24  1627 04/28/24  1633 04/28/24  1835 04/29/24  0541    139  --   --  139  --  139   K 4.1 3.9  --   --  4.6  --  4.6   CO2 22* 19*  --   --  15*  --  26   BUN 17.4 20.1  --   --  26.8*  --  29.5*   CREATININE 1.65* 1.44* 0.94  --  1.27*  --  0.98   CALCIUM 8.9 8.5  --   --  8.7  --  8.4   PH  --   --   --  7.090*  --  7.450  --    MG  --  1.60  --   --   --   --  2.00   ALBUMIN 3.9 3.4  --   --  3.6  --   --    ALKPHOS 114 88  --   --  100  --   --    ALT 41 42  --   --  388*  --   --    AST 45* 40*  --   --  192*  --   --     BILITOT 0.4 0.3  --   --  0.4  --   --      Microbiology Results (last 7 days)       Procedure Component Value Units Date/Time    Blood culture #1 **CANNOT BE ORDERED STAT** [[id number]]  (Normal) Collected: 04/26/24 0106    Order Status: Completed Specimen: Blood Updated: 04/30/24 0421     CULTURE, BLOOD ([facility]) No Growth At 96 Hours    Blood Culture [[id number]]     Order Status: Sent Specimen: Arterial Blood     Blood Culture [[id number]]     Order Status: Sent Specimen: Arterial Blood     Blood culture #2 **CANNOT BE ORDERED STAT** [[id number]]  (Abnormal)  (Susceptibility) Collected: 04/26/24 0107    Order Status: Completed Specimen: Blood Updated: 04/29/24 0637     CULTURE, BLOOD ([facility]) Staphylococcus capitis     GRAM STAIN Gram Positive Cocci, probable Staphylococcus      Seen in gram stain of broth only      1 of 2 Aerobic bottles positive    BCID2 Panel [[id number]]  (Abnormal) Collected: 04/26/24 0107    Order Status: Completed Specimen: Blood Updated: 04/27/24 1110     CTX-M (ESBL ) N/A     IMP (Cabapenemase ) N/A     KPC resistance gene (Carbapenemase ) N/A     mcr-1 N/A     mecA ID N/A     Comment: Note: Antimicrobial resistance can occur via multiple mechanisms. A Not Detected result for antimicrobial resistance gene(s) does not indicate antimicrobial susceptibility. Subculturing is required for species identification and susceptibility testing of   isolates.        mecA/C and MREJ (MRSA) gene N/A     NDM (Carbapenemase ) N/A     OXA-48-like (Carbapenemase ) N/A     Marlen/B (VRE gene) N/A     VIM (Carbapenemase ) N/A     Enterococcus faecalis Not Detected     Enterococcus faecium Not Detected     Listeria monocytogenes Not Detected     Staphylococcus spp. Detected     Staphylococcus aureus Not Detected     Staphylococcus epidermidis Not Detected     Staphylococcus lugdunensis Not Detected     Streptococcus spp. Not Detected     Streptococcus agalactiae  (Group B) Not Detected     Streptococcus pneumoniae Not Detected     Streptococcus pyogenes (Group A) Not Detected     Acinetobacter calcoaceticus/baumannii complex Not Detected     Bacteroides fragilis Not Detected     Enterobacterales Not Detected     Enterobacter cloacae complex Not Detected     Escherichia coli Not Detected     Klebsiella aerogenes Not Detected     Klebsiella oxytoca Not Detected     Klebsiella pneumoniae group Not Detected     Proteus spp. Not Detected     Salmonella spp. Not Detected     Serratia marcescens Not Detected     Haemophilus influenzae Not Detected     Neisseria meningitidis Not Detected     Pseudomonas aeruginosa Not Detected     Stenotrophomonas maltophilia Not Detected     Candida albicans Not Detected     Candida auris Not Detected     Candida glabrata Not Detected     Candida krusei Not Detected     Candida parapsilosis Not Detected     Candida tropicalis Not Detected     Cryptococcus neoformans/gattii Not Detected    Narrative:      The [employer] BCID2 Panel is a multiplexed nucleic acid test intended for the use with [employer]® [employer] or [employer]® [employer] for the simultaneous qualitative detection and identification of multiple bacterial and yeast nucleic acids and select genetic determinants associated with antimicrobial resistance.  The [employer] BCID2 Panel test is performed directly on blood culture samples identified as positive by a continuous monitoring blood culture system.  Results are intended to be interpreted in conjunction with Gram stain results.             Scheduled Med:  Current Facility-Administered Medications   Medication Dose Route Frequency    albuterol-ipratropium  3 mL Nebulization Q6H    benzonatate  100 mg Oral BID    doxycycline  100 mg Oral Q12H    enoxparin  40 mg Subcutaneous Daily    gabapentin  800 mg Oral BID    guaiFENesin  600 mg Oral BID    hydrOXYzine pamoate  25 mg Oral Daily    hydrOXYzine pamoate  50 mg Oral QHS     lisinopriL  10 mg Oral Daily    methylPREDNISolone sodium succinate injection  40 mg Intravenous Q12H    metoprolol succinate  25 mg Oral Daily    pantoprazole  40 mg Oral Daily    paroxetine  40 mg Oral Daily    sodium bicarbonate  650 mg Oral BID      Continuous Infusions:  Current Facility-Administered Medications   Medication Dose Route Frequency Last Rate Last Admin        PRN Meds:    Current Facility-Administered Medications:     acetaminophen, 650 mg, Oral, Q6H PRN    albuterol-ipratropium, 3 mL, Nebulization, Q4H PRN    aluminum-magnesium hydroxide-simethicone, 30 mL, Oral, QID PRN    budesonide, 0.25 mg, Nebulization, Daily PRN    melatonin, 6 mg, Oral, Nightly PRN    naloxone, 0.02 mg, Intravenous, PRN    ondansetron, 4 mg, Intravenous, Q4H PRN    polyethylene glycol, 17 g, Oral, BID PRN    prochlorperazine, 5 mg, Intravenous, Q6H PRN    simethicone, 1 tablet, Oral, QID PRN   __________________________________________________________________________________________________________________________________  Assessment/Plan:  Community-acquired pneumonia  AML (acute myeloid leukemia) in remission  COPD (chronic obstructive pulmonary disease)  Hyperlipidemia  Hypertension  Neuropathy      Above present on admission     Anticipated discharge and Disposition when medically stable:  Anticipate discharge in the a.m.  _______________________________________________________________________________________________________________________________  Patient presents for shortness of breath currently being treated for pneumonia and possible COPD exacerbation.  Continue IV steroid therapy for now.    Continue Tessalon Perles and Mucinex.  Continue doxycycline.    Transition from IV Solu-Medrol to prednisone 60 mg for 3 days.      Follow-up with repeat blood culture results.    1/2 fluid cultures positive for Staph capitis, repeat blood cultures negative thus far.  Continue breathing treatment.  That is the leg continue  sodium bicarbonate supplementation.    Repeat blood chemistry in the a.m..  Continue supportive care  Continue checking vital signs q4hrs.  Discussed tobacco cessation with patient and compliance with inhaler medications.      Reviewed and restarted appropriate home medications.   Nurse notified to page me if any changes occur     DVT prophylaxis initiated   Nutrition Status:  Heart healthy diet  _______________________________________________________________________________________________________________________________    This is a critical care document  Critical Care Diagnosis:  Hypoxic respiratory failure requiring supplemental oxygen therapy  Critical care interventions: hands on evaluation, review of labs/radiographs/records and discussions; assessing and managing the high probability of imminent or life-threatening deterioration of cardiorespiratory status.  Critical care time spent 75 minutes.     I have spent about 10mins of discussion about tobacco cessation.      time spent includes face to face time and non-face to face time preparing to see the patient (eg, review of tests), independently reviewing and interpreting medical records, both past and current; documenting clinical information in the electronic or other health record, and communicating results to the patient/family/caregiver and care coordinator and nursing team.      All diagnosis and differential diagnosis have been reviewed,  interpreted and communicated appropriately to care team. assessment and plan has been documented; I have personally reviewed the labs and test results that are presently available and pertinent to this hospital course; I have reviewed medical records based upon their availability.    All of the patient's questions have been  addressed and answered. Patient's is agreeable to the above stated plan.   I will continue to monitor closely and make adjustments to medical management as needed.    Noris Horton, DO    04/30/2024     This note was created with the assistance of Dragon voice recognition software. There may be transcription errors as a result of using this technology however minimal. Effort has been made to assure accuracy of transcription but any obvious errors or omissions should be clarified with the author of the document.

## 2024-05-01 PROBLEM — J18.9 PNA (PNEUMONIA): Status: ACTIVE | Noted: 2024-05-01

## 2024-05-01 LAB — BACTERIA BLD CULT: NORMAL

## 2024-05-01 PROCEDURE — 99900031 HC PATIENT EDUCATION (STAT)

## 2024-05-01 PROCEDURE — 25000003 PHARM REV CODE 250: Performed by: NURSE PRACTITIONER

## 2024-05-01 PROCEDURE — 25000242 PHARM REV CODE 250 ALT 637 W/ HCPCS: Performed by: STUDENT IN AN ORGANIZED HEALTH CARE EDUCATION/TRAINING PROGRAM

## 2024-05-01 PROCEDURE — 94640 AIRWAY INHALATION TREATMENT: CPT

## 2024-05-01 PROCEDURE — 99900035 HC TECH TIME PER 15 MIN (STAT)

## 2024-05-01 PROCEDURE — 94760 N-INVAS EAR/PLS OXIMETRY 1: CPT

## 2024-05-01 PROCEDURE — 25000003 PHARM REV CODE 250: Performed by: STUDENT IN AN ORGANIZED HEALTH CARE EDUCATION/TRAINING PROGRAM

## 2024-05-01 PROCEDURE — 63600175 PHARM REV CODE 636 W HCPCS: Performed by: NURSE PRACTITIONER

## 2024-05-01 PROCEDURE — 27000221 HC OXYGEN, UP TO 24 HOURS

## 2024-05-01 PROCEDURE — 21400001 HC TELEMETRY ROOM

## 2024-05-01 PROCEDURE — 63600175 PHARM REV CODE 636 W HCPCS: Performed by: STUDENT IN AN ORGANIZED HEALTH CARE EDUCATION/TRAINING PROGRAM

## 2024-05-01 RX ORDER — HYDRALAZINE HYDROCHLORIDE 20 MG/ML
10 INJECTION INTRAMUSCULAR; INTRAVENOUS EVERY 4 HOURS PRN
Status: DISCONTINUED | OUTPATIENT
Start: 2024-05-01 | End: 2024-05-03 | Stop reason: HOSPADM

## 2024-05-01 RX ORDER — PREDNISONE 20 MG/1
60 TABLET ORAL DAILY
Qty: 9 TABLET | Refills: 0 | Status: SHIPPED | OUTPATIENT
Start: 2024-05-01 | End: 2024-05-04

## 2024-05-01 RX ORDER — FLUTICASONE FUROATE AND VILANTEROL 100; 25 UG/1; UG/1
1 POWDER RESPIRATORY (INHALATION) DAILY
Status: DISCONTINUED | OUTPATIENT
Start: 2024-05-01 | End: 2024-05-03 | Stop reason: HOSPADM

## 2024-05-01 RX ORDER — BENZONATATE 100 MG/1
100 CAPSULE ORAL 2 TIMES DAILY
Qty: 20 CAPSULE | Refills: 0 | Status: SHIPPED | OUTPATIENT
Start: 2024-05-01 | End: 2024-05-11

## 2024-05-01 RX ORDER — DOXYCYCLINE HYCLATE 100 MG
100 TABLET ORAL EVERY 12 HOURS
Qty: 10 TABLET | Refills: 0 | Status: SHIPPED | OUTPATIENT
Start: 2024-05-01 | End: 2024-05-06

## 2024-05-01 RX ADMIN — PANTOPRAZOLE SODIUM 40 MG: 40 TABLET, DELAYED RELEASE ORAL at 10:05

## 2024-05-01 RX ADMIN — GUAIFENESIN 600 MG: 600 TABLET, EXTENDED RELEASE ORAL at 10:05

## 2024-05-01 RX ADMIN — DOXYCYCLINE HYCLATE 100 MG: 100 TABLET, COATED ORAL at 08:05

## 2024-05-01 RX ADMIN — LISINOPRIL 10 MG: 10 TABLET ORAL at 10:05

## 2024-05-01 RX ADMIN — DOXYCYCLINE HYCLATE 100 MG: 100 TABLET, COATED ORAL at 10:05

## 2024-05-01 RX ADMIN — PREDNISONE 60 MG: 10 TABLET ORAL at 10:05

## 2024-05-01 RX ADMIN — HYDROXYZINE PAMOATE 25 MG: 25 CAPSULE ORAL at 10:05

## 2024-05-01 RX ADMIN — IPRATROPIUM BROMIDE AND ALBUTEROL SULFATE 3 ML: 2.5; .5 SOLUTION RESPIRATORY (INHALATION) at 08:05

## 2024-05-01 RX ADMIN — BENZONATATE 100 MG: 100 CAPSULE ORAL at 10:05

## 2024-05-01 RX ADMIN — GUAIFENESIN 600 MG: 600 TABLET, EXTENDED RELEASE ORAL at 08:05

## 2024-05-01 RX ADMIN — IPRATROPIUM BROMIDE AND ALBUTEROL SULFATE 3 ML: 2.5; .5 SOLUTION RESPIRATORY (INHALATION) at 12:05

## 2024-05-01 RX ADMIN — ENOXAPARIN SODIUM 40 MG: 40 INJECTION SUBCUTANEOUS at 08:05

## 2024-05-01 RX ADMIN — HYDRALAZINE HYDROCHLORIDE 10 MG: 20 INJECTION INTRAMUSCULAR; INTRAVENOUS at 11:05

## 2024-05-01 RX ADMIN — GABAPENTIN 800 MG: 400 CAPSULE ORAL at 10:05

## 2024-05-01 RX ADMIN — FLUTICASONE FUROATE AND VILANTEROL TRIFENATATE 1 PUFF: 100; 25 POWDER RESPIRATORY (INHALATION) at 03:05

## 2024-05-01 RX ADMIN — METOPROLOL SUCCINATE 25 MG: 25 TABLET, EXTENDED RELEASE ORAL at 10:05

## 2024-05-01 RX ADMIN — HYDROXYZINE PAMOATE 50 MG: 50 CAPSULE ORAL at 08:05

## 2024-05-01 RX ADMIN — PAROXETINE HYDROCHLORIDE 40 MG: 20 TABLET, FILM COATED ORAL at 10:05

## 2024-05-01 RX ADMIN — GABAPENTIN 800 MG: 400 CAPSULE ORAL at 08:05

## 2024-05-01 RX ADMIN — BENZONATATE 100 MG: 100 CAPSULE ORAL at 08:05

## 2024-05-01 NOTE — PROGRESS NOTES
Inpatient Nutrition Evaluation    Admit Date: 4/25/2024   Total duration of encounter: 6 days    Nutrition Recommendation/Prescription     - continue oral diet as tolerated; Diet Heart Healthy     Nutrition Assessment     Chart Review    Reason Seen: length of stay    Malnutrition Screening Tool Results   Have you recently lost weight without trying?: No  Have you been eating poorly because of a decreased appetite?: No   MST Score: 0     Diagnosis:  Community-acquired pneumonia     Relevant Medical History: hypertension, hyperlipidemia, COPD not on home oxygen and AML in remission for 15 years     Nutrition-Related Medications: Scheduled Medications:  albuterol-ipratropium, 3 mL, Q6H  benzonatate, 100 mg, BID  doxycycline, 100 mg, Q12H  enoxparin, 40 mg, Daily  fluticasone furoate-vilanteroL, 1 puff, Daily  gabapentin, 800 mg, BID  guaiFENesin, 600 mg, BID  hydrOXYzine pamoate, 25 mg, Daily  hydrOXYzine pamoate, 50 mg, QHS  lisinopriL, 10 mg, Daily  metoprolol succinate, 25 mg, Daily  pantoprazole, 40 mg, Daily  paroxetine, 40 mg, Daily  predniSONE, 60 mg, Daily    Continuous Infusions:   PRN Medications:   Current Facility-Administered Medications:     acetaminophen, 650 mg, Oral, Q6H PRN    albuterol-ipratropium, 3 mL, Nebulization, Q4H PRN    aluminum-magnesium hydroxide-simethicone, 30 mL, Oral, QID PRN    budesonide, 0.25 mg, Nebulization, Daily PRN    hydrALAZINE, 10 mg, Intravenous, Q4H PRN    melatonin, 6 mg, Oral, Nightly PRN    naloxone, 0.02 mg, Intravenous, PRN    ondansetron, 4 mg, Intravenous, Q4H PRN    polyethylene glycol, 17 g, Oral, BID PRN    prochlorperazine, 5 mg, Intravenous, Q6H PRN    simethicone, 1 tablet, Oral, QID PRN     Nutrition-Related Labs:  Recent Labs   Lab 04/25/24  2259 04/26/24  0314 04/26/24  1221 04/28/24  0431 04/28/24  1633 04/29/24  0541    139  --   --  139 139   K 4.1 3.9  --   --  4.6 4.6   CALCIUM 8.9 8.5  --   --  8.7 8.4   PHOS  --  2.2*  --   --   --  3.2   MG   "--  1.60  --   --   --  2.00   CHLORIDE 107 109*  --   --  108* 106   CO2 22* 19*  --   --  15* 26   BUN 17.4 20.1  --   --  26.8* 29.5*   CREATININE 1.65* 1.44*  --  0.94 1.27* 0.98   EGFRNORACEVR 34 40  --  >60 47 >60   GLUCOSE 160* 220*  --   --  218* 128*   BILITOT 0.4 0.3  --   --  0.4  --    ALKPHOS 114 88  --   --  100  --    ALT 41 42  --   --  388*  --    AST 45* 40*  --   --  192*  --    ALBUMIN 3.9 3.4  --   --  3.6  --    HGBA1C  --   --  5.2  --   --   --    WBC 9.43 9.19  --   --  16.66* 6.80   HGB 13.5 11.1*  --   --  12.7 10.4*   HCT 39.7 33.0*  --   --  38.0 30.8*        Diet Order: Diet Heart Healthy  Oral Supplement Order: none  Appetite/Oral Intake: good/% of meals  Factors Affecting Nutritional Intake: none identified  Food/[employer]ar[employer]/Cultural Preferences: none reported  Food Allergies: no known food allergies    Skin Integrity: intact  Wound(s):       Comments    5/1/24: pt reports good appetite, tolerating oral diet, no unintentional weight loss; noted weight loss in EMR since last year, will monitor.    Anthropometrics    Height: 5' 2" (157.5 cm) Height Method: Stated  Last Weight: 59 kg (130 lb) (04/25/24 2227) Weight Method: Stated  BMI (Calculated): 23.8  BMI Classification: normal (BMI 18.5-24.9)     Ideal Body Weight (IBW), Female: 110 lb     % Ideal Body Weight, Female (lb): 118.18 %                             Usual Weight Provided By: EMR weight history and patient denies unintentional weight loss    Wt Readings from Last 5 Encounters:   04/25/24 59 kg (130 lb)   08/10/23 67.6 kg (149 lb)   05/11/23 67.1 kg (148 lb)   05/10/23 67.1 kg (148 lb)   05/07/23 68.9 kg (152 lb)     Weight Change(s) Since Admission:  Admit Weight: 59 kg (130 lb) (04/25/24 2009)      Patient Education    Not applicable.    Monitoring & Evaluation     Dietitian will monitor food and beverage intake and weight change.  Nutrition Risk/Follow-Up: low (follow-up in 5-7 days)  Patients assigned 'low nutrition " risk' status do not qualify for a full nutritional assessment but will be monitored and re-evaluated in a 5-7 day time period. Please consult if re-evaluation needed sooner.

## 2024-05-01 NOTE — PROGRESS NOTES
[facility]  Hospital Medicine Progress Note      Chief Complaint:  Shortness of breath    HPI: (personally reviewed by me and is documented from initial H&P)   64 y.o. White female with a past medical history of hypertension, hyperlipidemia, COPD not on home oxygen and AML in remission for 15 years. The patient presented to [facility] on 4/25/2024 with a primary complaint of shortness of breath and a productive cough with white sputum.  Patient reports cough has been present for the last 2 weeks progressively worsening resulting in shortness of breath.  She was had 1-2 episodes of diarrhea since presentation to hospital.  She denies complaints of fever, chills, abdominal pain, nausea, vomiting and diarrhea.     Upon presentation to the ED, temperature 97.9° F, heart rate 85, blood pressure 136/93, respiratory rate 33 and SpO2 99% on 15 L non-rebreather.  Labs with WBC 9.34, BUN/creatinine 17.4/1.65 (19.7/1.00 in November 2022), glucose 160, , troponin 0.010, lactic acid 2.5.  Influenza a/B and SARS-COV-2 PCR negative.  EKG normal sinus rhythm with nonspecific ST abnormalities.  Chest x-ray with patchy right lung base opacity concerning for possible pneumonia.  In ED patient received DuoNebs, azithromycin, Solu-Medrol, ipratropium and IV fluid hydration.  Patient was oxygen has been weaned down to 2 L nasal cannula.  She is admitted to hospital medicine services for further medical management.    Interval Hx:   Anticipated discharge home today as patient has been   Discharge today however patient went into another coughing spell and subsequently became hypoxic hypertensive and tachycardic.    Patient was given a breathing treatment which seemed to help to some extent however at one point it worsened her coughing spell.    __________________________________________________________________________________________________________________________________    Objective/physical exam:  Vital  signs have been personally reviewed by me   General: Appears comfortable, no acute distress.  Neuro:  Awake alert oriented, answers questions appropriately  Increased work of breathing, decreased air flow inspiratory expiratory wheezing appreciated but slightly improved post breathing treatment.    Integumentary: Warm, dry, intact.  Musculoskeletal: Purposeful movement noted.     Increased work of breathing   Cardiovascular: Regular rate.       VITAL SIGNS: 24 HRS MIN & MAX LAST   Temp  Min: 97.7 °F (36.5 °C)  Max: 98.7 °F (37.1 °C) 97.7 °F (36.5 °C)   BP  Min: 153/84  Max: 210/80 (!) 210/80   Pulse  Min: 58  Max: 79  69   Resp  Min: 14  Max: 27 (!) 27   SpO2  Min: 92 %  Max: 99 % 99 %     X-Ray Chest 1 View  Narrative: EXAMINATION:  XR CHEST 1 VIEW    CLINICAL HISTORY:  dyspnea;    TECHNIQUE:  Single view of the chest    COMPARISON:  04/25/2024    FINDINGS:  Prominent interstitial markings with no focal opacification.  No pleural effusion or pneumothorax.    The cardiomediastinal silhouette is within normal limits.    No acute osseous abnormality.  Impression: No acute cardiopulmonary process.  Prominent interstitial markings throughout increased in the interval.    Electronically signed by: Caleb aMrtinez  Date:    04/28/2024  Time:    16:41    Recent Labs   Lab 04/26/24 0314 04/28/24  1633 04/29/24  0541   WBC 9.19 16.66* 6.80   RBC 3.35* 3.77* 3.13*   HGB 11.1* 12.7 10.4*   HCT 33.0* 38.0 30.8*   MCV 98.5* 100.8* 98.4*   MCH 33.1* 33.7* 33.2*   MCHC 33.6 33.4 33.8   RDW 13.1 13.8 13.3    303 184   MPV 9.4 10.3 9.9       Recent Labs   Lab 04/25/24 2259 04/26/24  0314 04/28/24  0431 04/28/24  1627 04/28/24  1633 04/28/24  1835 04/29/24  0541    139  --   --  139  --  139   K 4.1 3.9  --   --  4.6  --  4.6   CO2 22* 19*  --   --  15*  --  26   BUN 17.4 20.1  --   --  26.8*  --  29.5*   CREATININE 1.65* 1.44* 0.94  --  1.27*  --  0.98   CALCIUM 8.9 8.5  --   --  8.7  --  8.4   PH  --   --   --  7.090*   --  7.450  --    MG  --  1.60  --   --   --   --  2.00   ALBUMIN 3.9 3.4  --   --  3.6  --   --    ALKPHOS 114 88  --   --  100  --   --    ALT 41 42  --   --  388*  --   --    AST 45* 40*  --   --  192*  --   --    BILITOT 0.4 0.3  --   --  0.4  --   --      Microbiology Results (last 7 days)       Procedure Component Value Units Date/Time    Blood culture #1 **CANNOT BE ORDERED STAT** [[id number]]  (Normal) Collected: 04/26/24 0106    Order Status: Completed Specimen: Blood Updated: 05/01/24 0418     CULTURE, BLOOD ([facility]) No Growth at 5 days    Blood Culture [[id number]]     Order Status: Sent Specimen: Arterial Blood     Blood Culture [[id number]]     Order Status: Sent Specimen: Arterial Blood     Blood culture #2 **CANNOT BE ORDERED STAT** [[id number]]  (Abnormal)  (Susceptibility) Collected: 04/26/24 0107    Order Status: Completed Specimen: Blood Updated: 04/29/24 0637     CULTURE, BLOOD ([facility]) Staphylococcus capitis     GRAM STAIN Gram Positive Cocci, probable Staphylococcus      Seen in gram stain of broth only      1 of 2 Aerobic bottles positive    BCID2 Panel [[id number]]  (Abnormal) Collected: 04/26/24 0107    Order Status: Completed Specimen: Blood Updated: 04/27/24 1110     CTX-M (ESBL ) N/A     IMP (Cabapenemase ) N/A     KPC resistance gene (Carbapenemase ) N/A     mcr-1 N/A     mecA ID N/A     Comment: Note: Antimicrobial resistance can occur via multiple mechanisms. A Not Detected result for antimicrobial resistance gene(s) does not indicate antimicrobial susceptibility. Subculturing is required for species identification and susceptibility testing of   isolates.        mecA/C and MREJ (MRSA) gene N/A     NDM (Carbapenemase ) N/A     OXA-48-like (Carbapenemase ) N/A     Marlen/B (VRE gene) N/A     VIM (Carbapenemase ) N/A     Enterococcus faecalis Not Detected     Enterococcus faecium Not Detected     Listeria monocytogenes Not Detected      Staphylococcus spp. Detected     Staphylococcus aureus Not Detected     Staphylococcus epidermidis Not Detected     Staphylococcus lugdunensis Not Detected     Streptococcus spp. Not Detected     Streptococcus agalactiae (Group B) Not Detected     Streptococcus pneumoniae Not Detected     Streptococcus pyogenes (Group A) Not Detected     Acinetobacter calcoaceticus/baumannii complex Not Detected     Bacteroides fragilis Not Detected     Enterobacterales Not Detected     Enterobacter cloacae complex Not Detected     Escherichia coli Not Detected     Klebsiella aerogenes Not Detected     Klebsiella oxytoca Not Detected     Klebsiella pneumoniae group Not Detected     Proteus spp. Not Detected     Salmonella spp. Not Detected     Serratia marcescens Not Detected     Haemophilus influenzae Not Detected     Neisseria meningitidis Not Detected     Pseudomonas aeruginosa Not Detected     Stenotrophomonas maltophilia Not Detected     Candida albicans Not Detected     Candida auris Not Detected     Candida glabrata Not Detected     Candida krusei Not Detected     Candida parapsilosis Not Detected     Candida tropicalis Not Detected     Cryptococcus neoformans/gattii Not Detected    Narrative:      The [employer] BCID2 Panel is a multiplexed nucleic acid test intended for the use with [employer]® 2.0 or [employer]Voices® [employer] Systems for the simultaneous qualitative detection and identification of multiple bacterial and yeast nucleic acids and select genetic determinants associated with antimicrobial resistance.  The [employer] BCID2 Panel test is performed directly on blood culture samples identified as positive by a continuous monitoring blood culture system.  Results are intended to be interpreted in conjunction with Gram stain results.             Scheduled Med:  Current Facility-Administered Medications   Medication Dose Route Frequency    albuterol-ipratropium  3 mL Nebulization Q6H    benzonatate  100 mg Oral BID     doxycycline  100 mg Oral Q12H    enoxparin  40 mg Subcutaneous Daily    gabapentin  800 mg Oral BID    guaiFENesin  600 mg Oral BID    hydrOXYzine pamoate  25 mg Oral Daily    hydrOXYzine pamoate  50 mg Oral QHS    lisinopriL  10 mg Oral Daily    metoprolol succinate  25 mg Oral Daily    pantoprazole  40 mg Oral Daily    paroxetine  40 mg Oral Daily    predniSONE  60 mg Oral Daily      Continuous Infusions:  Current Facility-Administered Medications   Medication Dose Route Frequency Last Rate Last Admin        PRN Meds:    Current Facility-Administered Medications:     acetaminophen, 650 mg, Oral, Q6H PRN    albuterol-ipratropium, 3 mL, Nebulization, Q4H PRN    aluminum-magnesium hydroxide-simethicone, 30 mL, Oral, QID PRN    budesonide, 0.25 mg, Nebulization, Daily PRN    hydrALAZINE, 10 mg, Intravenous, Q4H PRN    melatonin, 6 mg, Oral, Nightly PRN    naloxone, 0.02 mg, Intravenous, PRN    ondansetron, 4 mg, Intravenous, Q4H PRN    polyethylene glycol, 17 g, Oral, BID PRN    prochlorperazine, 5 mg, Intravenous, Q6H PRN    simethicone, 1 tablet, Oral, QID PRN   __________________________________________________________________________________________________________________________________  Assessment/Plan:  Community-acquired pneumonia  AML (acute myeloid leukemia) in remission  COPD (chronic obstructive pulmonary disease)  Hyperlipidemia  Hypertension  Neuropathy      Above present on admission     Anticipated discharge and Disposition when medically stable:    _______________________________________________________________________________________________________________________________  Patient presents for shortness of breath currently being treated for pneumonia and possible COPD exacerbation.    Coughing spell precipitating hypoxia improved with breathing treatment and IV Benadryl.    Concern there may be vocal cord dysfunction that may be contributing, will obtain spirometry/flow volume loops studies.   Add Breo. Consult pulmonologist    Continue Tessalon Perles and Mucinex.  Continue doxycycline.    Transition from IV Solu-Medrol to prednisone 60 mg for 3 days.      Follow-up with repeat blood culture results.    1/2 fluid cultures positive for Staph capitis, repeat blood cultures negative thus far.  Continue breathing treatment.  That is the leg continue sodium bicarbonate supplementation.    Repeat blood chemistry in the a.m..  Continue supportive care  Continue checking vital signs q4hrs.  Discussed tobacco cessation with patient and compliance with inhaler medications.      Reviewed and restarted appropriate home medications.   Nurse notified to page me if any changes occur     DVT prophylaxis initiated   Nutrition Status:  Heart healthy diet  _______________________________________________________________________________________________________________________________    This is a critical care document  Critical Care Diagnosis:  Hypoxic respiratory failure requiring supplemental oxygen therapy  Critical care interventions: hands on evaluation, review of labs/radiographs/records and discussions; assessing and managing the high probability of imminent or life-threatening deterioration of cardiorespiratory status.  Critical care time spent 75 minutes.        time spent includes face to face time and non-face to face time preparing to see the patient (eg, review of tests), independently reviewing and interpreting medical records, both past and current; documenting clinical information in the electronic or other health record, and communicating results to the patient/family/caregiver and care coordinator and nursing team.      All diagnosis and differential diagnosis have been reviewed,  interpreted and communicated appropriately to care team. assessment and plan has been documented; I have personally reviewed the labs and test results that are presently available and pertinent to this hospital course; I have  reviewed medical records based upon their availability.    All of the patient's questions have been  addressed and answered. Patient's is agreeable to the above stated plan.   I will continue to monitor closely and make adjustments to medical management as needed.    Noris Horton,    05/01/2024     This note was created with the assistance of Dragon voice recognition software. There may be transcription errors as a result of using this technology however minimal. Effort has been made to assure accuracy of transcription but any obvious errors or omissions should be clarified with the author of the document.

## 2024-05-02 PROCEDURE — 63600175 PHARM REV CODE 636 W HCPCS: Performed by: STUDENT IN AN ORGANIZED HEALTH CARE EDUCATION/TRAINING PROGRAM

## 2024-05-02 PROCEDURE — 25000003 PHARM REV CODE 250: Performed by: INTERNAL MEDICINE

## 2024-05-02 PROCEDURE — 25000242 PHARM REV CODE 250 ALT 637 W/ HCPCS: Performed by: STUDENT IN AN ORGANIZED HEALTH CARE EDUCATION/TRAINING PROGRAM

## 2024-05-02 PROCEDURE — 99900035 HC TECH TIME PER 15 MIN (STAT)

## 2024-05-02 PROCEDURE — 94760 N-INVAS EAR/PLS OXIMETRY 1: CPT

## 2024-05-02 PROCEDURE — 94761 N-INVAS EAR/PLS OXIMETRY MLT: CPT

## 2024-05-02 PROCEDURE — 25000242 PHARM REV CODE 250 ALT 637 W/ HCPCS: Performed by: INTERNAL MEDICINE

## 2024-05-02 PROCEDURE — 99900031 HC PATIENT EDUCATION (STAT)

## 2024-05-02 PROCEDURE — 94640 AIRWAY INHALATION TREATMENT: CPT

## 2024-05-02 PROCEDURE — 21400001 HC TELEMETRY ROOM

## 2024-05-02 PROCEDURE — 25000003 PHARM REV CODE 250: Performed by: NURSE PRACTITIONER

## 2024-05-02 PROCEDURE — 25000003 PHARM REV CODE 250: Performed by: STUDENT IN AN ORGANIZED HEALTH CARE EDUCATION/TRAINING PROGRAM

## 2024-05-02 PROCEDURE — 27000221 HC OXYGEN, UP TO 24 HOURS

## 2024-05-02 PROCEDURE — 25000242 PHARM REV CODE 250 ALT 637 W/ HCPCS: Performed by: NURSE PRACTITIONER

## 2024-05-02 RX ORDER — LISINOPRIL 20 MG/1
20 TABLET ORAL DAILY
Status: DISCONTINUED | OUTPATIENT
Start: 2024-05-02 | End: 2024-05-03 | Stop reason: HOSPADM

## 2024-05-02 RX ADMIN — BUDESONIDE 0.25 MG: 0.5 INHALANT RESPIRATORY (INHALATION) at 08:05

## 2024-05-02 RX ADMIN — PANTOPRAZOLE SODIUM 40 MG: 40 TABLET, DELAYED RELEASE ORAL at 09:05

## 2024-05-02 RX ADMIN — PAROXETINE HYDROCHLORIDE 40 MG: 20 TABLET, FILM COATED ORAL at 09:05

## 2024-05-02 RX ADMIN — TIOTROPIUM BROMIDE INHALATION SPRAY 2 PUFF: 3.12 SPRAY, METERED RESPIRATORY (INHALATION) at 12:05

## 2024-05-02 RX ADMIN — DOXYCYCLINE HYCLATE 100 MG: 100 TABLET, COATED ORAL at 09:05

## 2024-05-02 RX ADMIN — GUAIFENESIN 600 MG: 600 TABLET, EXTENDED RELEASE ORAL at 09:05

## 2024-05-02 RX ADMIN — IPRATROPIUM BROMIDE AND ALBUTEROL SULFATE 3 ML: 2.5; .5 SOLUTION RESPIRATORY (INHALATION) at 01:05

## 2024-05-02 RX ADMIN — FLUTICASONE FUROATE AND VILANTEROL TRIFENATATE 1 PUFF: 100; 25 POWDER RESPIRATORY (INHALATION) at 09:05

## 2024-05-02 RX ADMIN — BENZONATATE 100 MG: 100 CAPSULE ORAL at 09:05

## 2024-05-02 RX ADMIN — GABAPENTIN 800 MG: 400 CAPSULE ORAL at 09:05

## 2024-05-02 RX ADMIN — ENOXAPARIN SODIUM 40 MG: 40 INJECTION SUBCUTANEOUS at 09:05

## 2024-05-02 RX ADMIN — HYDROXYZINE PAMOATE 25 MG: 25 CAPSULE ORAL at 09:05

## 2024-05-02 RX ADMIN — IPRATROPIUM BROMIDE AND ALBUTEROL SULFATE 3 ML: 2.5; .5 SOLUTION RESPIRATORY (INHALATION) at 08:05

## 2024-05-02 RX ADMIN — IPRATROPIUM BROMIDE AND ALBUTEROL SULFATE 3 ML: .5; 3 SOLUTION RESPIRATORY (INHALATION) at 01:05

## 2024-05-02 RX ADMIN — PREDNISONE 60 MG: 10 TABLET ORAL at 09:05

## 2024-05-02 RX ADMIN — METOPROLOL SUCCINATE 25 MG: 25 TABLET, EXTENDED RELEASE ORAL at 09:05

## 2024-05-02 RX ADMIN — LISINOPRIL 20 MG: 20 TABLET ORAL at 09:05

## 2024-05-02 RX ADMIN — HYDROXYZINE PAMOATE 50 MG: 50 CAPSULE ORAL at 09:05

## 2024-05-02 NOTE — PLAN OF CARE
Problem: Adult Inpatient Plan of Care  Goal: Plan of Care Review  Outcome: Progressing  Goal: Patient-Specific Goal (Individualized)  Outcome: Progressing  Goal: Absence of Hospital-Acquired Illness or Injury  Outcome: Progressing  Goal: Optimal Comfort and Wellbeing  Outcome: Progressing  Goal: Readiness for Transition of Care  Outcome: Progressing     Problem: Pneumonia  Goal: Fluid Balance  Outcome: Progressing  Goal: Resolution of Infection Signs and Symptoms  Outcome: Progressing  Goal: Effective Oxygenation and Ventilation  Outcome: Progressing

## 2024-05-02 NOTE — PLAN OF CARE
Home O2 Qualification Test:     O2 sat on O2 at 2L/NC: 94%     O2 sat on room air at rest: 90%    O2 sat on room air with ambulation: 87%    Replaced O2 at 2L/NC. O2 sat with O2 recovered to: 94% with ambulation.     Note: The patient does qualify for home O2.

## 2024-05-03 VITALS
HEIGHT: 62 IN | BODY MASS INDEX: 23.92 KG/M2 | DIASTOLIC BLOOD PRESSURE: 66 MMHG | SYSTOLIC BLOOD PRESSURE: 156 MMHG | OXYGEN SATURATION: 97 % | WEIGHT: 130 LBS | TEMPERATURE: 98 F | HEART RATE: 65 BPM | RESPIRATION RATE: 18 BRPM

## 2024-05-03 LAB — POCT GLUCOSE: 93 MG/DL (ref 70–110)

## 2024-05-03 PROCEDURE — 27000221 HC OXYGEN, UP TO 24 HOURS

## 2024-05-03 PROCEDURE — 25000242 PHARM REV CODE 250 ALT 637 W/ HCPCS: Performed by: STUDENT IN AN ORGANIZED HEALTH CARE EDUCATION/TRAINING PROGRAM

## 2024-05-03 PROCEDURE — 25000003 PHARM REV CODE 250: Performed by: INTERNAL MEDICINE

## 2024-05-03 PROCEDURE — 99900035 HC TECH TIME PER 15 MIN (STAT)

## 2024-05-03 PROCEDURE — 25000003 PHARM REV CODE 250: Performed by: STUDENT IN AN ORGANIZED HEALTH CARE EDUCATION/TRAINING PROGRAM

## 2024-05-03 RX ORDER — FLUTICASONE FUROATE AND VILANTEROL 100; 25 UG/1; UG/1
1 POWDER RESPIRATORY (INHALATION) DAILY
Qty: 60 EACH | Refills: 0 | Status: SHIPPED | OUTPATIENT
Start: 2024-05-03

## 2024-05-03 RX ADMIN — DOXYCYCLINE HYCLATE 100 MG: 100 TABLET, COATED ORAL at 10:05

## 2024-05-03 RX ADMIN — PANTOPRAZOLE SODIUM 40 MG: 40 TABLET, DELAYED RELEASE ORAL at 10:05

## 2024-05-03 RX ADMIN — METOPROLOL SUCCINATE 25 MG: 25 TABLET, EXTENDED RELEASE ORAL at 10:05

## 2024-05-03 RX ADMIN — PAROXETINE HYDROCHLORIDE 40 MG: 20 TABLET, FILM COATED ORAL at 10:05

## 2024-05-03 RX ADMIN — GABAPENTIN 800 MG: 400 CAPSULE ORAL at 10:05

## 2024-05-03 RX ADMIN — GUAIFENESIN 600 MG: 600 TABLET, EXTENDED RELEASE ORAL at 10:05

## 2024-05-03 RX ADMIN — LISINOPRIL 20 MG: 20 TABLET ORAL at 10:05

## 2024-05-03 RX ADMIN — HYDROXYZINE PAMOATE 25 MG: 25 CAPSULE ORAL at 10:05

## 2024-05-03 RX ADMIN — FLUTICASONE FUROATE AND VILANTEROL TRIFENATATE 1 PUFF: 100; 25 POWDER RESPIRATORY (INHALATION) at 10:05

## 2024-05-03 RX ADMIN — BENZONATATE 100 MG: 100 CAPSULE ORAL at 10:05

## 2024-05-03 NOTE — NURSING
Pt AAOx4, VSS. Educated on discharge instructions, new medications, follow up appointments, and signs and symptoms to return to the ED with. All questions answered. Pt verbalize understanding. IV discontinued and guaze/tape applied to site with no signs of bleeding or swelling noted. Telemetry monitor discontinued and returned to box. Pt wheeled down via wheelchair.

## 2024-05-03 NOTE — PROGRESS NOTES
[facility]  Hospital Medicine Progress Note        Chief Complaint: Inpatient Follow-up for SOB    HPI:   64 y.o. White female with a past medical history of hypertension, hyperlipidemia, COPD not on home oxygen and AML in remission for 15 years. The patient presented to [facility] on 4/25/2024 with a primary complaint of shortness of breath and a productive cough with white sputum.  Patient reports cough has been present for the last 2 weeks progressively worsening resulting in shortness of breath.  She was had 1-2 episodes of diarrhea since presentation to hospital.  She denies complaints of fever, chills, abdominal pain, nausea, vomiting and diarrhea.     Upon presentation to the ED, temperature 97.9° F, heart rate 85, blood pressure 136/93, respiratory rate 33 and SpO2 99% on 15 L non-rebreather.  Labs with WBC 9.34, BUN/creatinine 17.4/1.65 (19.7/1.00 in November 2022), glucose 160, , troponin 0.010, lactic acid 2.5.  Influenza a/B and SARS-COV-2 PCR negative.  EKG normal sinus rhythm with nonspecific ST abnormalities.  Chest x-ray with patchy right lung base opacity concerning for possible pneumonia.  In ED patient received DuoNebs, azithromycin, Solu-Medrol, ipratropium and IV fluid hydration.  Patient was oxygen has been weaned down to 2 L nasal cannula.  She is admitted to hospital medicine services for further medical management.    Interval Hx:   Remains on O2 at 3L/min   Pt reports feeling better today . Stil has cough but better.   Plan to wean FiO2 and home O2 eval.    Case was discussed with patient's nurse and  on the floor.    Objective/physical exam:  General: In no acute distress, afebrile  Chest: Clear to auscultation bilaterally, no wheezes appreciated   Heart: RRR, +S1, S2, no appreciable murmur  Abdomen: Soft, nontender, BS +  MSK: Warm, no lower extremity edema, no clubbing or cyanosis  Neurologic: Alert and oriented x4, Cranial nerve II-XII intact,  Strength 5/5 in all 4 extremities    VITAL SIGNS: 24 HRS MIN & MAX LAST   Temp  Min: 97.5 °F (36.4 °C)  Max: 98.2 °F (36.8 °C) 97.5 °F (36.4 °C)   BP  Min: 134/65  Max: 166/79 (!) 157/84   Pulse  Min: 52  Max: 64  (!) 56   Resp  Min: 18  Max: 22 18   SpO2  Min: 95 %  Max: 100 % 97 %     I have reviewed the following labs:  Recent Labs   Lab 04/28/24  1633 04/29/24  0541   WBC 16.66* 6.80   RBC 3.77* 3.13*   HGB 12.7 10.4*   HCT 38.0 30.8*   .8* 98.4*   MCH 33.7* 33.2*   MCHC 33.4 33.8   RDW 13.8 13.3    184   MPV 10.3 9.9     Recent Labs   Lab 04/28/24  0431 04/28/24  1627 04/28/24  1633 04/28/24  1835 04/29/24  0541   NA  --   --  139  --  139   K  --   --  4.6  --  4.6   CO2  --   --  15*  --  26   BUN  --   --  26.8*  --  29.5*   CREATININE 0.94  --  1.27*  --  0.98   CALCIUM  --   --  8.7  --  8.4   PH  --  7.090*  --  7.450  --    MG  --   --   --   --  2.00   ALBUMIN  --   --  3.6  --   --    ALKPHOS  --   --  100  --   --    ALT  --   --  388*  --   --    AST  --   --  192*  --   --    BILITOT  --   --  0.4  --   --      Microbiology Results (last 7 days)       Procedure Component Value Units Date/Time    Blood culture #1 **CANNOT BE ORDERED STAT** [[id number]]  (Normal) Collected: 04/26/24 0106    Order Status: Completed Specimen: Blood Updated: 05/01/24 0418     CULTURE, BLOOD ([facility]) No Growth at 5 days    Blood Culture [[id number]]     Order Status: Sent Specimen: Arterial Blood     Blood Culture [[id number]]     Order Status: Sent Specimen: Arterial Blood     Blood culture #2 **CANNOT BE ORDERED STAT** [[id number]]  (Abnormal)  (Susceptibility) Collected: 04/26/24 0107    Order Status: Completed Specimen: Blood Updated: 04/29/24 0637     CULTURE, BLOOD ([facility]) Staphylococcus capitis     GRAM STAIN Gram Positive Cocci, probable Staphylococcus      Seen in gram stain of broth only      1 of 2 Aerobic bottles positive    BCID2 Panel [[id number]]  (Abnormal) Collected: 04/26/24 0107    Order  Status: Completed Specimen: Blood Updated: 04/27/24 1110     CTX-M (ESBL ) N/A     IMP (Cabapenemase ) N/A     KPC resistance gene (Carbapenemase ) N/A     mcr-1 N/A     mecA ID N/A     Comment: Note: Antimicrobial resistance can occur via multiple mechanisms. A Not Detected result for antimicrobial resistance gene(s) does not indicate antimicrobial susceptibility. Subculturing is required for species identification and susceptibility testing of   isolates.        mecA/C and MREJ (MRSA) gene N/A     NDM (Carbapenemase ) N/A     OXA-48-like (Carbapenemase ) N/A     Marlen/B (VRE gene) N/A     VIM (Carbapenemase ) N/A     Enterococcus faecalis Not Detected     Enterococcus faecium Not Detected     Listeria monocytogenes Not Detected     Staphylococcus spp. Detected     Staphylococcus aureus Not Detected     Staphylococcus epidermidis Not Detected     Staphylococcus lugdunensis Not Detected     Streptococcus spp. Not Detected     Streptococcus agalactiae (Group B) Not Detected     Streptococcus pneumoniae Not Detected     Streptococcus pyogenes (Group A) Not Detected     Acinetobacter calcoaceticus/baumannii complex Not Detected     Bacteroides fragilis Not Detected     Enterobacterales Not Detected     Enterobacter cloacae complex Not Detected     Escherichia coli Not Detected     Klebsiella aerogenes Not Detected     Klebsiella oxytoca Not Detected     Klebsiella pneumoniae group Not Detected     Proteus spp. Not Detected     Salmonella spp. Not Detected     Serratia marcescens Not Detected     Haemophilus influenzae Not Detected     Neisseria meningitidis Not Detected     Pseudomonas aeruginosa Not Detected     Stenotrophomonas maltophilia Not Detected     Candida albicans Not Detected     Candida auris Not Detected     Candida glabrata Not Detected     Candida krusei Not Detected     Candida parapsilosis Not Detected     Candida tropicalis Not Detected     Cryptococcus  neoformans/gattii Not Detected    Narrative:      The [employer] BCID2 Panel is a multiplexed nucleic acid test intended for the use with [employer]® 2.0 or [employer]® [employer] for the simultaneous qualitative detection and identification of multiple bacterial and yeast nucleic acids and select genetic determinants associated with antimicrobial resistance.  The [employer] BCID2 Panel test is performed directly on blood culture samples identified as positive by a continuous monitoring blood culture system.  Results are intended to be interpreted in conjunction with Gram stain results.             See below for Radiology    Scheduled Med:  Current Facility-Administered Medications   Medication Dose Route Frequency    benzonatate  100 mg Oral BID    doxycycline  100 mg Oral Q12H    enoxparin  40 mg Subcutaneous Daily    fluticasone furoate-vilanteroL  1 puff Inhalation Daily    gabapentin  800 mg Oral BID    guaiFENesin  600 mg Oral BID    hydrOXYzine pamoate  25 mg Oral Daily    hydrOXYzine pamoate  50 mg Oral QHS    lisinopriL  20 mg Oral Daily    metoprolol succinate  25 mg Oral Daily    pantoprazole  40 mg Oral Daily    paroxetine  40 mg Oral Daily    tiotropium bromide  2 puff Inhalation Daily      Continuous Infusions:  Current Facility-Administered Medications   Medication Dose Route Frequency Last Rate Last Admin      PRN Meds:    Current Facility-Administered Medications:     acetaminophen, 650 mg, Oral, Q6H PRN    albuterol-ipratropium, 3 mL, Nebulization, Q4H PRN    aluminum-magnesium hydroxide-simethicone, 30 mL, Oral, QID PRN    budesonide, 0.25 mg, Nebulization, Daily PRN    hydrALAZINE, 10 mg, Intravenous, Q4H PRN    melatonin, 6 mg, Oral, Nightly PRN    naloxone, 0.02 mg, Intravenous, PRN    ondansetron, 4 mg, Intravenous, Q4H PRN    polyethylene glycol, 17 g, Oral, BID PRN    prochlorperazine, 5 mg, Intravenous, Q6H PRN    simethicone, 1 tablet, Oral, QID PRN      Assessment/Plan:  Community Acquired pneumonia involving Rt lower lobe due to unspecified organism   COPD with acute exacerbation   Acute hypoxic respiratory failure due to above   Acute kidney injury , POA- resolved   Transaminitis   Elevated BNP in the setting of COPD exacerbation without evidence of cardiac decompensation   Essential HTN - poor control   Hyperlipidemia   Hx- AML (acute myeloid leukemia) in remission , Neuropathy     Plan-  Continue inhaled bronchodilators   Add Spiriva  Continue oral Prednisone taper.  Continue oral antibiotic targeting lower resp infection /CAP  Wean fiO2 as tolerated to keep spO2 >90%  Home O2 eval  DC home once home oxygen setup completed once qualified     Critical care note:  Critical care diagnosis: Hypoxic resp failure requiring supp oxygen   Critical care interventions: Hands-on evaluation, review of labs/radiographs/records and discussion with patient and family if present  Critical care time spent: 35 minutes        VTE prophylaxis: Lovenox    Patient condition:  Fair     Anticipated discharge and Disposition:     Home with family     All diagnosis and differential diagnosis have been reviewed; assessment and plan has been documented; I have personally reviewed the labs and test results that are presently available; I have reviewed the patients medication list; I have reviewed the consulting providers response and recommendations. I have reviewed or attempted to review medical records based upon their availability    All of the patient's questions have been  addressed and answered. Patient's is agreeable to the above stated plan. I will continue to monitor closely and make adjustments to medical management as needed.  ______________________    Martín Jha MD  Department of Hospital Medicine   [facility]   05/03/2024

## 2024-05-03 NOTE — PLAN OF CARE
05/03/24 1146   Final Note   Assessment Type Final Discharge Note   Anticipated Discharge Disposition Home  (FOC: the patient will return home with home O2 (Equipment/Supplies) through [employer]. The O2 was delivered on yesterday (5/2/2024) by [employer] ([employer]: ).)   Hospital Resources/Appts/Education Provided Appointment suggestion unavailable   Post-Acute Status   Coverage Payor: HU[employer]NA MANAGED MEDICARE - HU[employer] MEDICARE HMO -   Patient choice form signed by patient/caregiver List with quality metrics by geographic area provided   Discharge Delays None known at this time     The patient will be discharged from [facility] to her private residence. She did received home O2 which was delivered to her room by [employer] ([employer]: ). He was informed of the above and delivered the O2 equipment/supplies on: 5/2/2024. The patient was informed of the above discharge plans and she is in agreement. Transportation will be arranged by her family member's personal vehicle.

## 2024-05-06 ENCOUNTER — PATIENT OUTREACH (OUTPATIENT)
Dept: ADMINISTRATIVE | Facility: CLINIC | Age: 64
End: 2024-05-06
Payer: MEDICARE

## 2024-05-06 ENCOUNTER — TELEPHONE (OUTPATIENT)
Dept: FAMILY MEDICINE | Facility: CLINIC | Age: 64
End: 2024-05-06
Payer: MEDICARE

## 2024-05-06 DIAGNOSIS — I10 HYPERTENSION, UNSPECIFIED TYPE: ICD-10-CM

## 2024-05-06 NOTE — PROGRESS NOTES
C3 nurse spoke with Allyssa Sood  for a [facility] post hospital discharge follow up call. The patient has a scheduled \[facility]\"" appointment with Katie Mcdaniel MD  on 05/14/2024 @ 940 am.

## 2024-05-06 NOTE — TELEPHONE ENCOUNTER
Hospital F/U Appointment Date: 05/14/2024 @ 9:40    Discharge Date:05/03/24    Discharge Facility: [facility]    If External Facility, is Discharge paperwork available: yes    Discharge Diagnosis: Pneumonia    Specialty Referrals / Appointments? No     Home Health Services or DME? No    Discharged Medication Reviewed?  Yes    Questions regarding medications? None    Advised to bring ALL medications for visit: Yes    Is the patient experiencing any symptoms today? none    Clinic Phone number given to patient?  Yes

## 2024-05-07 RX ORDER — LISINOPRIL 10 MG/1
10 TABLET ORAL
Qty: 90 TABLET | Refills: 1 | OUTPATIENT
Start: 2024-05-07

## 2024-05-12 NOTE — DISCHARGE SUMMARY
[facility] Hospital Medicine Discharge Summary    Admit Date: 4/25/2024  Discharge Date and Time: 5/03/2024, 02:43 pm  Admitting Physician:  Team  Discharging Physician: Martín Jha MD.  Primary Care Physician: Katie Mcdaniel MD  Consults: None    Discharge Diagnoses:  Community Acquired pneumonia involving Rt lower lobe due to unspecified organism   COPD with acute exacerbation   Acute hypoxic respiratory failure due to above   Acute kidney injury , POA- resolved   Transaminitis   Elevated BNP in the setting of COPD exacerbation without evidence of cardiac decompensation   Essential HTN - poor control   Hyperlipidemia   Hx- AML (acute myeloid leukemia) in remission , Neuropathy       Hospital Course:     64 y.o. White female with a past medical history of hypertension, hyperlipidemia, COPD not on home oxygen and AML in remission for 15 years. The patient presented to [facility] on 4/25/2024 with a primary complaint of shortness of breath and a productive cough with white sputum.  Patient reports cough has been present for the last 2 weeks progressively worsening resulting in shortness of breath.  She also had 1-2 episodes of diarrhea since presentation to hospital.  She denies complaints of fever, chills, abdominal pain, nausea, vomiting and diarrhea.     Upon presentation to the ED, temperature 97.9° F, heart rate 85, blood pressure 136/93, respiratory rate 33 and SpO2 99% on 15 L non-rebreather.  Labs with WBC 9.34, BUN/creatinine 17.4/1.65 (19.7/1.00 in November 2022), glucose 160, , troponin 0.010, lactic acid 2.5.  Influenza a/B and SARS-COV-2 PCR negative.  EKG normal sinus rhythm with nonspecific ST abnormalities.  Chest x-ray with patchy right lung base opacity concerning for possible pneumonia.  In ED patient received DuoNebs, azithromycin, Solu-Medrol, ipratropium and IV fluid hydration.  Patient's oxygen has been weaned down to 2 L nasal cannula.  She is  "admitted to hospital medicine services for further medical management.    Pt was continued on antibiotic targeting CAP, supplemental oxygen with goal SpO2 >90%, aggressive nebulized bronchodilators , ICS and systemic steroid for COPD exacerbation. Cough continues to be distressful required extra days of hospital stay. Pt was evaluated for home o2 and was qualified. CM consulted to assist with DC needs including Home O2. On 5/3/24 , pt deemed stable for discharge to home with Home O2.     Pt was seen and examined on the day of discharge  Vitals:  VITAL SIGNS: 24 HRS MIN & MAX LAST   No data recorded 97.6 °F (36.4 °C)   No data recorded (!) 156/66   No data recorded  65   No data recorded 18   No data recorded 97 %       Physical Exam:  General: In no acute distress, afebrile  Chest: Clear to auscultation bilaterally, no wheezes appreciated   Heart: RRR, +S1, S2, no appreciable murmur  Abdomen: Soft, nontender, BS +  MSK: Warm, no lower extremity edema, no clubbing or cyanosis  Neurologic: Alert and oriented x4, Cranial nerve II-XII intact, Strength 5/5 in all 4 extremities    Procedures Performed: No admission procedures for hospital encounter.     Significant Diagnostic Studies: See Full reports for all details    No results for input(s): "WBC", "RBC", "HGB", "HCT", "MCV", "MCH", "MCHC", "RDW", "PLT", "MPV", "GRAN", "LYMPH", "MONO", "BASO", "NRBC" in the last 168 hours.    No results for input(s): "NA", "K", "CL", "CO2", "ANIONGAP", "BUN", "CREATININE", "GLU", "CALCIUM", "PH", "MG", "ALBUMIN", "PROT", "ALKPHOS", "ALT", "AST", "BILITOT" in the last 168 hours.     Microbiology Results (last 7 days)       Procedure Component Value Units Date/Time    Blood culture #1 **CANNOT BE ORDERED STAT** [[id number]]  (Normal) Collected: 04/26/24 0106    Order Status: Completed Specimen: Blood Updated: 05/01/24 0418     Blood Culture No Growth at 5 days    Blood Culture [[id number]]     Order Status: Canceled Specimen: Arterial " Blood     Blood Culture [[id number]]     Order Status: Canceled Specimen: Arterial Blood     Blood culture #2 **CANNOT BE ORDERED STAT** [[id number]]  (Abnormal)  (Susceptibility) Collected: 04/26/24 0107    Order Status: Completed Specimen: Blood Updated: 04/29/24 0637     Blood Culture Staphylococcus capitis     GRAM STAIN Gram Positive Cocci, probable Staphylococcus      Seen in gram stain of broth only      1 of 2 Aerobic bottles positive    BCID2 Panel [[id number]]  (Abnormal) Collected: 04/26/24 0107    Order Status: Completed Specimen: Blood Updated: 04/27/24 1110     CTX-M (ESBL ) N/A     IMP (Cabapenemase ) N/A     KPC resistance gene (Carbapenemase ) N/A     mcr-1 N/A     mecA ID N/A     Comment: Note: Antimicrobial resistance can occur via multiple mechanisms. A Not Detected result for antimicrobial resistance gene(s) does not indicate antimicrobial susceptibility. Subculturing is required for species identification and susceptibility testing of   isolates.        mecA/C and MREJ (MRSA) gene N/A     NDM (Carbapenemase ) N/A     OXA-48-like (Carbapenemase ) N/A     Marlen/B (VRE gene) N/A     VIM (Carbapenemase ) N/A     Enterococcus faecalis Not Detected     Enterococcus faecium Not Detected     Listeria monocytogenes Not Detected     Staphylococcus spp. Detected     Staphylococcus aureus Not Detected     Staphylococcus epidermidis Not Detected     Staphylococcus lugdunensis Not Detected     Streptococcus spp. Not Detected     Streptococcus agalactiae (Group B) Not Detected     Streptococcus pneumoniae Not Detected     Streptococcus pyogenes (Group A) Not Detected     Acinetobacter calcoaceticus/baumannii complex Not Detected     Bacteroides fragilis Not Detected     Enterobacterales Not Detected     Enterobacter cloacae complex Not Detected     Escherichia coli Not Detected     Klebsiella aerogenes Not Detected     Klebsiella oxytoca Not Detected      Klebsiella pneumoniae group Not Detected     Proteus spp. Not Detected     Salmonella spp. Not Detected     Serratia marcescens Not Detected     Haemophilus influenzae Not Detected     Neisseria meningitidis Not Detected     Pseudomonas aeruginosa Not Detected     Stenotrophomonas maltophilia Not Detected     Candida albicans Not Detected     Candida auris Not Detected     Candida glabrata Not Detected     Candida krusei Not Detected     Candida parapsilosis Not Detected     Candida tropicalis Not Detected     Cryptococcus neoformans/gattii Not Detected    Narrative:      [employer] BCID2 Panel is a multiplexed nucleic acid test intended for the use with [employer] or [employer] for the simultaneous qualitative detection and identification of multiple bacterial and yeast nucleic acids and select genetic determinants associated with antimicrobial resistance.  [employer] BCID2 Panel test is performed directly on blood culture samples identified as positive by a continuous monitoring blood culture system.  Results are intended to be interpreted in conjunction with Gram stain results.             X-Ray Chest 1 View  Narrative: EXAMINATION:  XR CHEST 1 VIEW    CLINICAL HISTORY:  dyspnea;    TECHNIQUE:  Single view of the chest    COMPARISON:  04/25/2024    FINDINGS:  Prominent interstitial markings with no focal opacification.  No pleural effusion or pneumothorax.    The cardiomediastinal silhouette is within normal limits.    No acute osseous abnormality.  Impression: No acute cardiopulmonary process.  Prominent interstitial markings throughout increased in the interval.    Electronically signed by: Caleb Martinez  Date:    04/28/2024  Time:    16:41         Medication List        START taking these medications      benzonatate 100 MG capsule  Commonly known as: TESSALON  Take 1 capsule (100 mg total) by mouth 2 (two) times daily. for 10 days     fluticasone furoate-vilanteroL  100-25 mcg/dose diskus inhaler  Commonly known as: BREO  Inhale 1 puff into the lungs once daily. Controller     tiotropium bromide 2.5 mcg/actuation inhaler  Commonly known as: SPIRIVA RESPIMAT  Inhale 2 puffs into the lungs once daily. Controller            CHANGE how you take these medications      gabapentin 800 MG tablet  Commonly known as: NEURONTIN  TAKE ONE TABLET BY MOUTH THREE TIMES DAILY  What changed: Another medication with the same name was removed. Continue taking this medication, and follow the directions you see here.            CONTINUE taking these medications      albuterol 90 mcg/actuation inhaler  Commonly known as: PROVENTIL/VENTOLIN HFA     * albuterol-ipratropium 2.5 mg-0.5 mg/3 mL nebulizer solution  Commonly known as: DUO-NEB     * albuterol-ipratropium 2.5 mg-0.5 mg/3 mL nebulizer solution  Commonly known as: DUO-NEB  Take 3 mLs by nebulization every 6 (six) hours as needed for Wheezing. Rescue     budesonide 0.25 mg/2 mL nebulizer solution  Commonly known as: PULMICORT  Take 2 mLs (0.25 mg total) by nebulization daily as needed (Wheezing or shortness of breath). Controller     ezetimibe 10 mg tablet  Commonly known as: ZETIA  TAKE ONE TABLET BY MOUTH EVERY EVENING     * VistariL 50 MG Cap  Generic drug: hydrOXYzine pamoate     * hydrOXYzine pamoate 25 MG Cap  Commonly known as: VISTARIL  Take 1 capsule (25 mg total) by mouth 3 (three) times daily.     lisinopriL 10 MG tablet  TAKE ONE TABLET BY MOUTH EVERY DAY     metoprolol succinate 25 MG 24 hr tablet  Commonly known as: TOPROL-XL  TAKE ONE TABLET BY MOUTH EVERY DAY     pantoprazole 40 MG tablet  Commonly known as: PROTONIX  TAKE 1 TABLET BY MOUTH EVERY DAY     paroxetine 40 MG tablet  Commonly known as: PAXIL  Take 1 tablet (40 mg total) by mouth once daily.           * This list has 4 medication(s) that are the same as other medications prescribed for you. Read the directions carefully, and ask your doctor or other care provider to  review them with you.                ASK your doctor about these medications      doxycycline 100 MG tablet  Commonly known as: VIBRA-TABS  Take 1 tablet (100 mg total) by mouth every 12 (twelve) hours. for 5 days  Ask about: Should I take this medication?     predniSONE 20 MG tablet  Commonly known as: DELTASONE  Take 3 tablets (60 mg total) by mouth once daily. for 3 days  Ask about: Should I take this medication?               Where to Get Your Medications        These medications were sent to [facility] - [facility] [address]  [address] [address]      Phone: [phone]   benzonatate 100 MG capsule  doxycycline 100 MG tablet  fluticasone furoate-vilanteroL 100-25 mcg/dose diskus inhaler  predniSONE 20 MG tablet  tiotropium bromide 2.5 mcg/actuation inhaler          Explained in detail to the patient about the discharge plan, medications, and follow-up visits. Pt understands and agrees with the treatment plan  Discharge Disposition: Home or Self Care   Discharged Condition: stable  Diet-    Medications Per DC med rec  Activities as tolerated   Contact information for follow-up providers       Katie Mcdaniel MD Follow up on 5/14/2024.    Specialty: Family Medicine  Why: 9:40am  Contact information:  [address] [facility] [address]  [phone]                       Contact information for after-discharge care       Durable Medical Equipment       [employer] .    Service: Durable Medical Equipment  Contact information:  [email or web address]  [facility] [id number] [phone]                                 For further questions contact hospitalist office    Discharge time 33 minutes    For worsening symptoms, chest pain, shortness of breath, increased abdominal pain, high grade fever, stroke or stroke like symptoms, immediately go to the nearest Emergency Room or call 911 as soon as possible.      Martín Prescott M.D,  on 5/03/2024, 02:43 pm

## 2024-05-14 ENCOUNTER — OFFICE VISIT (OUTPATIENT)
Dept: FAMILY MEDICINE | Facility: CLINIC | Age: 64
End: 2024-05-14
Payer: MEDICARE

## 2024-05-14 VITALS
DIASTOLIC BLOOD PRESSURE: 68 MMHG | HEIGHT: 62 IN | SYSTOLIC BLOOD PRESSURE: 121 MMHG | BODY MASS INDEX: 28.52 KG/M2 | HEART RATE: 53 BPM | TEMPERATURE: 98 F | WEIGHT: 155 LBS | OXYGEN SATURATION: 98 % | RESPIRATION RATE: 18 BRPM

## 2024-05-14 DIAGNOSIS — F32.A ANXIETY AND DEPRESSION: ICD-10-CM

## 2024-05-14 DIAGNOSIS — F41.9 ANXIETY AND DEPRESSION: ICD-10-CM

## 2024-05-14 DIAGNOSIS — Z78.0 POSTMENOPAUSAL: ICD-10-CM

## 2024-05-14 DIAGNOSIS — Z23 IMMUNIZATION DUE: ICD-10-CM

## 2024-05-14 DIAGNOSIS — R91.8 PULMONARY INFILTRATES: ICD-10-CM

## 2024-05-14 DIAGNOSIS — I10 HYPERTENSION, UNSPECIFIED TYPE: ICD-10-CM

## 2024-05-14 DIAGNOSIS — K21.9 GASTROESOPHAGEAL REFLUX DISEASE, UNSPECIFIED WHETHER ESOPHAGITIS PRESENT: ICD-10-CM

## 2024-05-14 DIAGNOSIS — J44.9 CHRONIC OBSTRUCTIVE PULMONARY DISEASE, UNSPECIFIED COPD TYPE: ICD-10-CM

## 2024-05-14 DIAGNOSIS — D64.9 ANEMIA, UNSPECIFIED TYPE: ICD-10-CM

## 2024-05-14 DIAGNOSIS — Z09 HOSPITAL DISCHARGE FOLLOW-UP: Primary | ICD-10-CM

## 2024-05-14 DIAGNOSIS — Z01.818 PREOPERATIVE CLEARANCE: ICD-10-CM

## 2024-05-14 DIAGNOSIS — I07.1 TRICUSPID VALVE INSUFFICIENCY, UNSPECIFIED ETIOLOGY: ICD-10-CM

## 2024-05-14 DIAGNOSIS — I35.1 AORTIC VALVE INSUFFICIENCY, ETIOLOGY OF CARDIAC VALVE DISEASE UNSPECIFIED: ICD-10-CM

## 2024-05-14 DIAGNOSIS — R74.8 ELEVATED LIVER ENZYMES: ICD-10-CM

## 2024-05-14 DIAGNOSIS — Z12.31 ENCOUNTER FOR SCREENING MAMMOGRAM FOR MALIGNANT NEOPLASM OF BREAST: ICD-10-CM

## 2024-05-14 LAB
ALBUMIN SERPL-MCNC: 3.8 G/DL (ref 3.4–4.8)
ALBUMIN/GLOB SERPL: 1.2 RATIO (ref 1.1–2)
ALP SERPL-CCNC: 92 UNIT/L (ref 40–150)
ALT SERPL-CCNC: 32 UNIT/L (ref 0–55)
AST SERPL-CCNC: 20 UNIT/L (ref 5–34)
BASOPHILS # BLD AUTO: 0.01 X10(3)/MCL
BASOPHILS NFR BLD AUTO: 0.2 %
BILIRUB SERPL-MCNC: 0.4 MG/DL
BNP BLD-MCNC: 329.2 PG/ML
BUN SERPL-MCNC: 28.1 MG/DL (ref 9.8–20.1)
CALCIUM SERPL-MCNC: 9.6 MG/DL (ref 8.4–10.2)
CHLORIDE SERPL-SCNC: 108 MMOL/L (ref 98–107)
CO2 SERPL-SCNC: 23 MMOL/L (ref 23–31)
CREAT SERPL-MCNC: 1.05 MG/DL (ref 0.55–1.02)
EOSINOPHIL # BLD AUTO: 0.12 X10(3)/MCL (ref 0–0.9)
EOSINOPHIL NFR BLD AUTO: 2.7 %
ERYTHROCYTE [DISTWIDTH] IN BLOOD BY AUTOMATED COUNT: 13.6 % (ref 11.5–17)
GFR SERPLBLD CREATININE-BSD FMLA CKD-EPI: 59 ML/MIN/1.73/M2
GLOBULIN SER-MCNC: 3.1 GM/DL (ref 2.4–3.5)
GLUCOSE SERPL-MCNC: 74 MG/DL (ref 82–115)
HCT VFR BLD AUTO: 35.1 % (ref 37–47)
HGB BLD-MCNC: 12.3 G/DL (ref 12–16)
IMM GRANULOCYTES # BLD AUTO: 0.01 X10(3)/MCL (ref 0–0.04)
IMM GRANULOCYTES NFR BLD AUTO: 0.2 %
LYMPHOCYTES # BLD AUTO: 1.57 X10(3)/MCL (ref 0.6–4.6)
LYMPHOCYTES NFR BLD AUTO: 35.7 %
MCH RBC QN AUTO: 34.6 PG (ref 27–31)
MCHC RBC AUTO-ENTMCNC: 35 G/DL (ref 33–36)
MCV RBC AUTO: 98.9 FL (ref 80–94)
MONOCYTES # BLD AUTO: 0.51 X10(3)/MCL (ref 0.1–1.3)
MONOCYTES NFR BLD AUTO: 11.6 %
NEUTROPHILS # BLD AUTO: 2.18 X10(3)/MCL (ref 2.1–9.2)
NEUTROPHILS NFR BLD AUTO: 49.6 %
NRBC BLD AUTO-RTO: 0 %
PLATELET # BLD AUTO: 204 X10(3)/MCL (ref 130–400)
PMV BLD AUTO: 10.5 FL (ref 7.4–10.4)
POTASSIUM SERPL-SCNC: 4.3 MMOL/L (ref 3.5–5.1)
PROT SERPL-MCNC: 6.9 GM/DL (ref 5.8–7.6)
RBC # BLD AUTO: 3.55 X10(6)/MCL (ref 4.2–5.4)
SODIUM SERPL-SCNC: 140 MMOL/L (ref 136–145)
WBC # SPEC AUTO: 4.4 X10(3)/MCL (ref 4.5–11.5)

## 2024-05-14 PROCEDURE — 99495 TRANSJ CARE MGMT MOD F2F 14D: CPT | Mod: S$PBB,,, | Performed by: FAMILY MEDICINE

## 2024-05-14 PROCEDURE — 3074F SYST BP LT 130 MM HG: CPT | Mod: CPTII,,, | Performed by: FAMILY MEDICINE

## 2024-05-14 PROCEDURE — 1160F RVW MEDS BY RX/DR IN RCRD: CPT | Mod: CPTII,,, | Performed by: FAMILY MEDICINE

## 2024-05-14 PROCEDURE — 3078F DIAST BP <80 MM HG: CPT | Mod: CPTII,,, | Performed by: FAMILY MEDICINE

## 2024-05-14 PROCEDURE — 3288F FALL RISK ASSESSMENT DOCD: CPT | Mod: CPTII,,, | Performed by: FAMILY MEDICINE

## 2024-05-14 PROCEDURE — 36415 COLL VENOUS BLD VENIPUNCTURE: CPT | Performed by: FAMILY MEDICINE

## 2024-05-14 PROCEDURE — 1111F DSCHRG MED/CURRENT MED MERGE: CPT | Mod: CPTII,,, | Performed by: FAMILY MEDICINE

## 2024-05-14 PROCEDURE — 3044F HG A1C LEVEL LT 7.0%: CPT | Mod: CPTII,,, | Performed by: FAMILY MEDICINE

## 2024-05-14 PROCEDURE — 83880 ASSAY OF NATRIURETIC PEPTIDE: CPT | Performed by: FAMILY MEDICINE

## 2024-05-14 PROCEDURE — 80053 COMPREHEN METABOLIC PANEL: CPT | Performed by: FAMILY MEDICINE

## 2024-05-14 PROCEDURE — 4010F ACE/ARB THERAPY RXD/TAKEN: CPT | Mod: CPTII,,, | Performed by: FAMILY MEDICINE

## 2024-05-14 PROCEDURE — 85025 COMPLETE CBC W/AUTO DIFF WBC: CPT | Performed by: FAMILY MEDICINE

## 2024-05-14 PROCEDURE — 1101F PT FALLS ASSESS-DOCD LE1/YR: CPT | Mod: CPTII,,, | Performed by: FAMILY MEDICINE

## 2024-05-14 PROCEDURE — 1159F MED LIST DOCD IN RCRD: CPT | Mod: CPTII,,, | Performed by: FAMILY MEDICINE

## 2024-05-14 PROCEDURE — 99215 OFFICE O/P EST HI 40 MIN: CPT | Mod: PBBFAC | Performed by: FAMILY MEDICINE

## 2024-05-14 RX ORDER — FAMOTIDINE 20 MG/1
20 TABLET, FILM COATED ORAL 2 TIMES DAILY
Qty: 60 TABLET | Refills: 2 | Status: SHIPPED | OUTPATIENT
Start: 2024-05-14

## 2024-05-14 RX ORDER — GUAIFENESIN 600 MG/1
1200 TABLET, EXTENDED RELEASE ORAL 2 TIMES DAILY
Qty: 60 TABLET | Refills: 0 | Status: SHIPPED | OUTPATIENT
Start: 2024-05-14

## 2024-05-14 RX ORDER — METOPROLOL SUCCINATE 25 MG/1
12.5 TABLET, EXTENDED RELEASE ORAL DAILY
Qty: 45 TABLET | Refills: 0 | Status: SHIPPED | OUTPATIENT
Start: 2024-05-14

## 2024-05-14 RX ORDER — PAROXETINE HYDROCHLORIDE 40 MG/1
40 TABLET, FILM COATED ORAL DAILY
Qty: 90 TABLET | Refills: 0 | Status: SHIPPED | OUTPATIENT
Start: 2024-05-14

## 2024-05-14 RX ORDER — PANTOPRAZOLE SODIUM 40 MG/1
40 TABLET, DELAYED RELEASE ORAL DAILY
Qty: 90 TABLET | Refills: 0 | Status: SHIPPED | OUTPATIENT
Start: 2024-05-14

## 2024-05-14 NOTE — PROGRESS NOTES
Patient Name: Allyssa Sood   : 1960  MRN: [id number]     SUBJECTIVE:  Allyssa Sood is a 64 y.o. female here for Hospital Follow Up (The patient states that she still has a lot of mucus and cold. )  .    HPI  Here for hospital follow up.  Admitted on  and discharged on 5/3  Presented there for cough and shortness a breath.  Was found to have pneumonia, COPD exacerbation.  Initially she was placed on 15 L non-rebreather , then transitioned to nasal cannula oxygen, weaned down to 2 L and discharged on home oxygen, Breo and Spiriva.  In the hospital she received DuoNebs, Zithromax, Solu-Medrol, ipratropium and IV fluid hydration. Doxy for 5 days   Feels better, cough was improving until today, runny nose. No fever or chills. Cough sometimes with pinkish or whitish mucus. Not wearing oxygen in the office, sometimes at home. Shortness of breath has greatly improved. No leg swelling  Chart reviewing, last CBC showed hemoglobin of 10.4.  MCV 98.  She also had an PEDIRTO with creatinine of 1.65, eventually improved but last 1 was 1.27 and elevated liver enzymes  Quit smoking more than 5 years ago. Former smoker.   Rarely use of alcohol    Had a cardiac perfusion imaging done in  which was normal.  Echocardiogram in 2022 showed EF 55%, moderate tricuspid regurgitation with RSVP 60 mmHg,  Mild to moderate aortic regurgitation  Used to follow with cardiology. Questionable hx of CHF . Was hospitalized years ago for questionable   Never had PFT done, was told many years ago she had COPD.   Father  of heart attack at 32 years old.  Mentioned pre op clearance at the end of visit for an eye surgery but understand she needs to see cardiology for this. Has not seen them in a while and has questionable hx of CHF    On Paxil 40 mg daily which was increased from 30 mg.  Feels better.    Has had EGD and colonoscopy 7-8 years ago. Was told normal. No colon cancer in family  Mammogram- agreeable to have one  "done.  Tetanus not covered.          ALLERGIES:   Review of patient's allergies indicates:   Allergen Reactions    Lovastatin Dermatitis    Penicillins Shortness Of Breath    Sulfa (sulfonamide antibiotics) Rash         ROS:  Review of Systems   Constitutional:  Negative for chills and fever.   HENT:  Positive for congestion.    Eyes:  Negative for blurred vision.   Respiratory:  Positive for cough and sputum production. Negative for shortness of breath.    Cardiovascular:  Negative for chest pain, palpitations and leg swelling.   Gastrointestinal:  Negative for abdominal pain, blood in stool, diarrhea, nausea and vomiting.   Genitourinary:  Negative for dysuria and hematuria.   Neurological:  Negative for dizziness and headaches.   Psychiatric/Behavioral:  Negative for depression. The patient is not nervous/anxious.          OBJECTIVE:  Vital signs  Vitals:    05/14/24 0945   BP: 121/68   Pulse: (!) 53   Resp: 18   Temp: 98.2 °F (36.8 °C)   TempSrc: Oral   SpO2: 98%   Weight: 70.3 kg (155 lb)   Height: 5' 2" (1.575 m)      Body mass index is 28.35 kg/m².    PHYSICAL EXAM:   Physical Exam  Vitals reviewed.   Constitutional:       General: She is not in acute distress.     Appearance: Normal appearance. She is not ill-appearing.   HENT:      Head: Normocephalic and atraumatic.      Right Ear: External ear normal.      Left Ear: External ear normal.      Nose: Nose normal. No rhinorrhea.      Mouth/Throat:      Mouth: Mucous membranes are moist.   Eyes:      General: No scleral icterus.        Right eye: No discharge.         Left eye: No discharge.      Conjunctiva/sclera: Conjunctivae normal.      Pupils: Pupils are equal, round, and reactive to light.   Cardiovascular:      Rate and Rhythm: Regular rhythm. Bradycardia present.   Pulmonary:      Effort: Pulmonary effort is normal. No respiratory distress.      Breath sounds: No wheezing, rhonchi or rales.   Abdominal:      General: Bowel sounds are normal. There is " no distension.      Palpations: Abdomen is soft.      Tenderness: There is no abdominal tenderness.   Musculoskeletal:      Cervical back: Normal range of motion and neck supple. No rigidity or tenderness.      Right lower leg: No edema.      Left lower leg: No edema.   Skin:     General: Skin is warm.      Findings: No rash.   Neurological:      General: No focal deficit present.      Mental Status: She is alert and oriented to person, place, and time.   Psychiatric:         Mood and Affect: Mood normal.         Behavior: Behavior normal.          ASSESSMENT/PLAN:  1. Hospital discharge follow-up  Symptomatically doing much better with only lingering cough some congestion.  Stable vital signs.  Continue with Mucinex and will do CT chest to evaluate for pulmonary infiltrates and recheck BNP given history of questionable CHF.    2. Chronic obstructive pulmonary disease, unspecified COPD type  Never diagnosed with COPD, check pulmonary function test.  States never had issues with breathing.    -     guaiFENesin (MUCINEX) 600 mg 12 hr tablet; Take 2 tablets (1,200 mg total) by mouth 2 (two) times daily.  Dispense: 60 tablet; Refill: 0  -     Complete PFT w/ bronchodilator; Future    3. Pulmonary infiltrates  -     CT Chest Without Contrast; Future; Expected date: 05/14/2024  -     BNP    4. Anxiety and depression  Well-controlled.  Continue with Paxil 40 mg daily.  -     paroxetine (PAXIL) 40 MG tablet; Take 1 tablet (40 mg total) by mouth once daily.  Dispense: 90 tablet; Refill: 0    5. Hypertension, unspecified type  Well-controlled but  bradycardic so will decrease metoprolol to 12.5 mg daily  -     metoprolol succinate (TOPROL-XL) 25 MG 24 hr tablet; Take 0.5 tablets (12.5 mg total) by mouth once daily.  Dispense: 45 tablet; Refill: 0    6. Anemia, unspecified type  Recheck CBC.  No known etiology of the anemia, so will refer to GI for further evaluation given also elevated liver enzymes.  -     CBC Auto  Differential  -     Ambulatory referral/consult to Gastroenterology; Future; Expected date: 05/21/2024    7. Elevated liver enzymes  -     Comprehensive Metabolic Panel  -     Ambulatory referral/consult to Gastroenterology; Future; Expected date: 05/21/2024    8. Preoperative clearance  Needs to be evaluated from Cardiology.  Has questionable CHF and has not had any workup lately.  -     Ambulatory referral/consult to Cardiology; Future; Expected date: 05/21/2024    9. Aortic valve insufficiency, etiology of cardiac valve disease unspecified  -     Ambulatory referral/consult to Cardiology; Future; Expected date: 05/21/2024    10. Tricuspid valve insufficiency, unspecified etiology  -     Ambulatory referral/consult to Cardiology; Future; Expected date: 05/21/2024    11. Gastroesophageal reflux disease, unspecified whether esophagitis present  -     pantoprazole (PROTONIX) 40 MG tablet; Take 1 tablet (40 mg total) by mouth once daily.  Dispense: 90 tablet; Refill: 0  -     Ambulatory referral/consult to Gastroenterology; Future; Expected date: 05/21/2024  -     famotidine (PEPCID) 20 MG tablet; Take 1 tablet (20 mg total) by mouth 2 (two) times daily.  Dispense: 60 tablet; Refill: 2    12. Encounter for screening mammogram for malignant neoplasm of breast  -     Mammo Digital Screening Bilat w/ Sandor; Future; Expected date: 05/14/2024    13. Postmenopausal  -     DXA Bone Density Axial Skeleton 1 or more sites; Future; Expected date: 05/14/2024    14. Immunization due  Tetanus not covered.  Will have it at health unit           Previous medical history/lab work/radiology reviewed and considered during medical management decisions.   Medication list reviewed and medication reconciliation performed.  Patient was provided  and care about his/her current diagnosis (es) and medications including risk/benefit and side effects/adverse events, over the counter medication uses/doses, home self-care and contact  precautions,  and red flags and indications for when to seek immediate medical attention.   Patient was advised to continue compliance with current medication list and medical recommendations.  Recommended/ Advised continued compliance with recommended eating habits/ diets for medical conditions and exercise 150 minutes/ week (if possible) for medical condition (s).        RESULTS:  Recent Results (from the past 1008 hour(s))   Vancomycin, Random    Collection Time: 04/28/24  4:31 AM   Result Value Ref Range    Vancomycin Random 10.1 (L) 15.0 - 20.0 ug/ml   Creatinine, Serum    Collection Time: 04/28/24  4:31 AM   Result Value Ref Range    Creatinine 0.94 0.55 - 1.02 mg/dL    eGFR >60 mls/min/1.73/m2   POCT glucose    Collection Time: 04/28/24  4:10 PM   Result Value Ref Range    POCT Glucose 166 (H) 70 - 110 mg/dL   EKG 12-lead    Collection Time: 04/28/24  4:11 PM   Result Value Ref Range    QRS Duration 108 ms    [facility] QTC Calculation 495 ms   RT Blood Gas    Collection Time: 04/28/24  4:27 PM   Result Value Ref Range    Sample Type Arterial Blood     Sample site Right Radial Artery     Drawn by W[place]KA Branch, RRT     pH, Blood gas 7.090 (LL) 7.350 - 7.450    pCO2, Blood gas 64.0 (HH) 35.0 - 45.0 mmHg    pO2, Blood gas 92.0 80.0 - 100.0 mmHg    Sodium, Blood Gas 136 (L) 137 - 145 mmol/L    Potassium, Blood Gas 4.1 3.5 - 5.0 mmol/L    Calcium Level Ionized 1.24 (H) 1.12 - 1.23 mmol/L    TOC2, Blood gas 21.4 mmol/L    Base Excess, Blood gas -11.00 (L) -2.00 - 2.00 mmol/L    sO2, Blood gas 93.3 %    HCO3, Blood gas 19.4 (L) 22.0 - 26.0 mmol/L    THb, Blood gas 12.7 12 - 16 g/dL    O2 Hb, Blood Gas 93.9 (L) 94.0 - 97.0 %    CO Hgb 2.1 (H) 0.5 - 1.5 %    Met Hgb 0.7 0.4 - 1.5 %    Oxygen Device, Blood gas Aerosol Mask     FIO2, Blood gas 40 %   Comprehensive Metabolic Panel    Collection Time: 04/28/24  4:33 PM   Result Value Ref Range    Sodium 139 136 - 145 mmol/L    Potassium 4.6 3.5 - 5.1 mmol/L    Chloride 108 (H) 98 -  107 mmol/L    CO2 15 (L) 23 - 31 mmol/L    Glucose 218 (H) 82 - 115 mg/dL    Blood Urea Nitrogen 26.8 (H) 9.8 - 20.1 mg/dL    Creatinine 1.27 (H) 0.55 - 1.02 mg/dL    Calcium 8.7 8.4 - 10.2 mg/dL    Protein Total 7.2 5.8 - 7.6 gm/dL    Albumin 3.6 3.4 - 4.8 g/dL    Globulin 3.6 (H) 2.4 - 3.5 gm/dL    Albumin/Globulin Ratio 1.0 (L) 1.1 - 2.0 ratio    Bilirubin Total 0.4 <=1.5 mg/dL     40 - 150 unit/L     (H) 0 - 55 unit/L     (H) 5 - 34 unit/L    eGFR 47 mls/min/1.73/m2   CBC with Differential    Collection Time: 04/28/24  4:33 PM   Result Value Ref Range    WBC 16.66 (H) 4.50 - 11.50 x10(3)/mcL    RBC 3.77 (L) 4.20 - 5.40 x10(6)/mcL    Hgb 12.7 12.0 - 16.0 g/dL    Hct 38.0 37.0 - 47.0 %    .8 (H) 80.0 - 94.0 fL    MCH 33.7 (H) 27.0 - 31.0 pg    MCHC 33.4 33.0 - 36.0 g/dL    RDW 13.8 11.5 - 17.0 %    Platelet 303 130 - 400 x10(3)/mcL    MPV 10.3 7.4 - 10.4 fL    Neut % 75.9 %    Lymph % 18.3 %    Mono % 4.7 %    Eos % 0.1 %    Basophil % 0.2 %    Lymph # 3.05 0.6 - 4.6 x10(3)/mcL    Neut # 12.66 (H) 2.1 - 9.2 x10(3)/mcL    Mono # 0.78 0.1 - 1.3 x10(3)/mcL    Eos # 0.01 0 - 0.9 x10(3)/mcL    Baso # 0.03 <=0.2 x10(3)/mcL    IG# 0.13 (H) 0 - 0.04 x10(3)/mcL    IG% 0.8 %    NRBC% 0.0 %   Troponin I    Collection Time: 04/28/24  4:33 PM   Result Value Ref Range    Troponin-I <0.010 0.000 - 0.045 ng/mL   RT Blood Gas    Collection Time: 04/28/24  6:35 PM   Result Value Ref Range    Sample Type Arterial Blood     Sample site Right Radial Artery     Drawn by mm,rrt     pH, Blood gas 7.450 7.350 - 7.450    pCO2, Blood gas 42.0 35.0 - 45.0 mmHg    pO2, Blood gas 82.0 80.0 - 100.0 mmHg    Sodium, Blood Gas 136 (L) 137 - 145 mmol/L    Potassium, Blood Gas 4.1 3.5 - 5.0 mmol/L    Calcium Level Ionized 1.14 1.12 - 1.23 mmol/L    TOC2, Blood gas 30.5 mmol/L    Base Excess, Blood gas 4.70 (H) -2.00 - 2.00 mmol/L    sO2, Blood gas 96.5 %    HCO3, Blood gas 29.2 (H) 22.0 - 26.0 mmol/L    THb, Blood gas  11.2 (L) 12 - 16 g/dL    O2 Hb, Blood Gas 95.6 94.0 - 97.0 %    CO Hgb 1.8 (H) 0.5 - 1.5 %    Met Hgb 1.0 0.4 - 1.5 %    Allens Test N/A     MODE BiPAP     FIO2, Blood gas 30 %    [facility] RR 14 b/min    BiPAP (I) 12 cm H2O    BiPAP (E) 6 cm H2O   Basic Metabolic Panel    Collection Time: 04/29/24  5:41 AM   Result Value Ref Range    Sodium 139 136 - 145 mmol/L    Potassium 4.6 3.5 - 5.1 mmol/L    Chloride 106 98 - 107 mmol/L    CO2 26 23 - 31 mmol/L    Glucose 128 (H) 82 - 115 mg/dL    Blood Urea Nitrogen 29.5 (H) 9.8 - 20.1 mg/dL    Creatinine 0.98 0.55 - 1.02 mg/dL    BUN/Creatinine Ratio 30     Calcium 8.4 8.4 - 10.2 mg/dL    Anion Gap 7.0 mEq/L    eGFR >60 mls/min/1.73/m2   Magnesium    Collection Time: 04/29/24  5:41 AM   Result Value Ref Range    Magnesium Level 2.00 1.60 - 2.60 mg/dL   Phosphorus    Collection Time: 04/29/24  5:41 AM   Result Value Ref Range    Phosphorus Level 3.2 2.3 - 4.7 mg/dL   CBC with Differential    Collection Time: 04/29/24  5:41 AM   Result Value Ref Range    WBC 6.80 4.50 - 11.50 x10(3)/mcL    RBC 3.13 (L) 4.20 - 5.40 x10(6)/mcL    Hgb 10.4 (L) 12.0 - 16.0 g/dL    Hct 30.8 (L) 37.0 - 47.0 %    MCV 98.4 (H) 80.0 - 94.0 fL    MCH 33.2 (H) 27.0 - 31.0 pg    MCHC 33.8 33.0 - 36.0 g/dL    RDW 13.3 11.5 - 17.0 %    Platelet 184 130 - 400 x10(3)/mcL    MPV 9.9 7.4 - 10.4 fL    Neut % 82.4 %    Lymph % 12.6 %    Mono % 4.6 %    Eos % 0.0 %    Basophil % 0.0 %    Lymph # 0.86 0.6 - 4.6 x10(3)/mcL    Neut # 5.60 2.1 - 9.2 x10(3)/mcL    Mono # 0.31 0.1 - 1.3 x10(3)/mcL    Eos # 0.00 0 - 0.9 x10(3)/mcL    Baso # 0.00 <=0.2 x10(3)/mcL    IG# 0.03 0 - 0.04 x10(3)/mcL    IG% 0.4 %    NRBC% 0.0 %   POCT glucose    Collection Time: 05/03/24 11:43 AM   Result Value Ref Range    POCT Glucose 93 70 - 110 mg/dL   Comprehensive Metabolic Panel    Collection Time: 05/14/24 10:51 AM   Result Value Ref Range    Sodium 140 136 - 145 mmol/L    Potassium 4.3 3.5 - 5.1 mmol/L    Chloride 108 (H) 98 - 107 mmol/L     CO2 23 23 - 31 mmol/L    Glucose 74 (L) 82 - 115 mg/dL    Blood Urea Nitrogen 28.1 (H) 9.8 - 20.1 mg/dL    Creatinine 1.05 (H) 0.55 - 1.02 mg/dL    Calcium 9.6 8.4 - 10.2 mg/dL    Protein Total 6.9 5.8 - 7.6 gm/dL    Albumin 3.8 3.4 - 4.8 g/dL    Globulin 3.1 2.4 - 3.5 gm/dL    Albumin/Globulin Ratio 1.2 1.1 - 2.0 ratio    Bilirubin Total 0.4 <=1.5 mg/dL    ALP 92 40 - 150 unit/L    ALT 32 0 - 55 unit/L    AST 20 5 - 34 unit/L    eGFR 59 mL/min/1.73/m2   BNP    Collection Time: 05/14/24 10:51 AM   Result Value Ref Range    Natriuretic Peptide 329.2 (H) <=100.0 pg/mL   CBC with Differential    Collection Time: 05/14/24 10:51 AM   Result Value Ref Range    WBC 4.40 (L) 4.50 - 11.50 x10(3)/mcL    RBC 3.55 (L) 4.20 - 5.40 x10(6)/mcL    Hgb 12.3 12.0 - 16.0 g/dL    Hct 35.1 (L) 37.0 - 47.0 %    MCV 98.9 (H) 80.0 - 94.0 fL    MCH 34.6 (H) 27.0 - 31.0 pg    MCHC 35.0 33.0 - 36.0 g/dL    RDW 13.6 11.5 - 17.0 %    Platelet 204 130 - 400 x10(3)/mcL    MPV 10.5 (H) 7.4 - 10.4 fL    Neut % 49.6 %    Lymph % 35.7 %    Mono % 11.6 %    Eos % 2.7 %    Basophil % 0.2 %    Lymph # 1.57 0.6 - 4.6 x10(3)/mcL    Neut # 2.18 2.1 - 9.2 x10(3)/mcL    Mono # 0.51 0.1 - 1.3 x10(3)/mcL    Eos # 0.12 0 - 0.9 x10(3)/mcL    Baso # 0.01 <=0.2 x10(3)/mcL    IG# 0.01 0 - 0.04 x10(3)/mcL    IG% 0.2 %    NRBC% 0.0 %         Follow Up:  Follow up in about 3 months (around 8/14/2024) for copd.         This note was created with the assistance of a voice recognition software or phone dictation. There may be transcription errors as a result of using this technology however minimal. Effort has been made to assure accuracy of transcription but any obvious errors or omissions should be clarified with the author of the document

## 2024-06-05 ENCOUNTER — HOSPITAL ENCOUNTER (OUTPATIENT)
Dept: RADIOLOGY | Facility: HOSPITAL | Age: 64
Discharge: HOME | End: 2024-06-05
Attending: FAMILY MEDICINE
Payer: MEDICARE

## 2024-06-05 DIAGNOSIS — Z78.0 POSTMENOPAUSAL: ICD-10-CM

## 2024-06-05 DIAGNOSIS — Z12.31 ENCOUNTER FOR SCREENING MAMMOGRAM FOR MALIGNANT NEOPLASM OF BREAST: ICD-10-CM

## 2024-06-05 PROCEDURE — 77080 DXA BONE DENSITY AXIAL: CPT | Mod: 26,,, | Performed by: RADIOLOGY

## 2024-06-05 PROCEDURE — 77080 DXA BONE DENSITY AXIAL: CPT | Mod: TC

## 2024-06-05 PROCEDURE — 77067 SCR MAMMO BI INCL CAD: CPT | Mod: TC

## 2024-06-05 PROCEDURE — 77067 SCR MAMMO BI INCL CAD: CPT | Mod: 26,,, | Performed by: RADIOLOGY

## 2024-06-05 PROCEDURE — 77063 BREAST TOMOSYNTHESIS BI: CPT | Mod: 26,,, | Performed by: RADIOLOGY

## 2024-06-08 PROBLEM — J44.9 CHRONIC OBSTRUCTIVE PULMONARY DISEASE: Status: ACTIVE | Noted: 2024-06-08

## 2024-06-08 PROBLEM — F32.A ANXIETY AND DEPRESSION: Status: ACTIVE | Noted: 2024-06-08

## 2024-06-08 PROBLEM — I10 HYPERTENSION: Status: ACTIVE | Noted: 2024-06-08

## 2024-06-08 PROBLEM — I07.1 TRICUSPID VALVE INSUFFICIENCY: Status: ACTIVE | Noted: 2024-06-08

## 2024-06-08 PROBLEM — I35.1 AORTIC VALVE REGURGITATION: Status: ACTIVE | Noted: 2024-06-08

## 2024-06-08 PROBLEM — F41.9 ANXIETY AND DEPRESSION: Status: ACTIVE | Noted: 2024-06-08

## 2024-06-10 ENCOUNTER — TELEPHONE (OUTPATIENT)
Dept: FAMILY MEDICINE | Facility: CLINIC | Age: 64
End: 2024-06-10
Payer: MEDICARE

## 2024-06-10 NOTE — TELEPHONE ENCOUNTER
LVM    ----- Message from Katie Mcdaniel MD sent at 6/8/2024 11:19 PM CDT -----  Please let the patient know that DEXA scan showed osteopenia which is lower than normal bone density but not quite osteoporosis.  Please advise patient to be compliant with vitamin-D 1000 units daily over-the-counter and try aerobic exercises such as walking, dancing.  Regarding labs, liver enzymes had normalized.  Anemia/hemoglobin also normalized.  The marker that would indicate heart failure exacerbation, also improved from the level when hospitalized.  Kidney function slightly abnormal, please advise patient to drink plenty of water and avoid NSAIDs such as ibuprofen/naproxen/Advil, can only take Tylenol as needed.

## 2024-06-12 DIAGNOSIS — F41.9 ANXIETY AND DEPRESSION: ICD-10-CM

## 2024-06-12 DIAGNOSIS — F32.A ANXIETY AND DEPRESSION: ICD-10-CM

## 2024-06-12 DIAGNOSIS — G62.9 NEUROPATHY: ICD-10-CM

## 2024-06-12 RX ORDER — HYDROXYZINE PAMOATE 25 MG/1
25 CAPSULE ORAL 3 TIMES DAILY
Qty: 90 CAPSULE | Refills: 2 | Status: SHIPPED | OUTPATIENT
Start: 2024-06-12

## 2024-06-12 RX ORDER — GABAPENTIN 800 MG/1
800 TABLET ORAL 3 TIMES DAILY
Qty: 180 TABLET | Refills: 0 | Status: SHIPPED | OUTPATIENT
Start: 2024-06-12

## 2024-06-12 NOTE — TELEPHONE ENCOUNTER
Called patient via phone call and patient understands results given. No further questions at this time.     ----- Message from Katie Mcdaniel MD sent at 6/8/2024 11:19 PM CDT -----  Please let the patient know that DEXA scan showed osteopenia which is lower than normal bone density but not quite osteoporosis.  Please advise patient to be compliant with vitamin-D 1000 units daily over-the-counter and try aerobic exercises such as walking, dancing.  Regarding labs, liver enzymes had normalized.  Anemia/hemoglobin also normalized.  The marker that would indicate heart failure exacerbation, also improved from the level when hospitalized.  Kidney function slightly abnormal, please advise patient to drink plenty of water and avoid NSAIDs such as ibuprofen/naproxen/Advil, can only take Tylenol as needed.

## 2024-06-12 NOTE — PROGRESS NOTES
Not likely because osteopenia means low bone density/strength which can be improved with Vit D/calcium and light exercises.  Thanks

## 2024-07-09 DIAGNOSIS — I10 HYPERTENSION, UNSPECIFIED TYPE: ICD-10-CM

## 2024-07-11 DIAGNOSIS — I10 HYPERTENSION, UNSPECIFIED TYPE: ICD-10-CM

## 2024-07-11 RX ORDER — METOPROLOL SUCCINATE 25 MG/1
TABLET, EXTENDED RELEASE ORAL
Qty: 45 TABLET | Refills: 0 | OUTPATIENT
Start: 2024-07-11

## 2024-07-11 RX ORDER — METOPROLOL SUCCINATE 25 MG/1
12.5 TABLET, EXTENDED RELEASE ORAL DAILY
Qty: 45 TABLET | Refills: 0 | Status: SHIPPED | OUTPATIENT
Start: 2024-07-11

## 2024-07-29 ENCOUNTER — OFFICE VISIT (OUTPATIENT)
Dept: URGENT CARE | Facility: CLINIC | Age: 64
End: 2024-07-29
Payer: MEDICARE

## 2024-07-29 VITALS
TEMPERATURE: 99 F | BODY MASS INDEX: 28.52 KG/M2 | RESPIRATION RATE: 20 BRPM | DIASTOLIC BLOOD PRESSURE: 80 MMHG | HEIGHT: 62 IN | HEART RATE: 97 BPM | WEIGHT: 155 LBS | SYSTOLIC BLOOD PRESSURE: 124 MMHG | OXYGEN SATURATION: 96 %

## 2024-07-29 DIAGNOSIS — R05.3 CHRONIC COUGH: ICD-10-CM

## 2024-07-29 DIAGNOSIS — R05.9 COUGH, UNSPECIFIED TYPE: Primary | ICD-10-CM

## 2024-07-29 LAB
CTP QC/QA: YES
SARS-COV-2 AG RESP QL IA.RAPID: NEGATIVE

## 2024-07-29 RX ORDER — BETAMETHASONE SODIUM PHOSPHATE AND BETAMETHASONE ACETATE 3; 3 MG/ML; MG/ML
6 INJECTION, SUSPENSION INTRA-ARTICULAR; INTRALESIONAL; INTRAMUSCULAR; SOFT TISSUE
Status: COMPLETED | OUTPATIENT
Start: 2024-07-29 | End: 2024-07-29

## 2024-07-29 RX ORDER — FLUTICASONE FUROATE AND VILANTEROL 100; 25 UG/1; UG/1
1 POWDER RESPIRATORY (INHALATION) DAILY
Qty: 14 EACH | Refills: 1 | Status: SHIPPED | OUTPATIENT
Start: 2024-07-29

## 2024-07-29 RX ORDER — DOXYCYCLINE HYCLATE 100 MG
100 TABLET ORAL 2 TIMES DAILY
Qty: 20 TABLET | Refills: 0 | Status: SHIPPED | OUTPATIENT
Start: 2024-07-29

## 2024-07-29 RX ORDER — METHYLPREDNISOLONE 4 MG/1
TABLET ORAL
Qty: 21 EACH | Refills: 0 | Status: SHIPPED | OUTPATIENT
Start: 2024-07-29

## 2024-07-29 RX ADMIN — BETAMETHASONE SODIUM PHOSPHATE AND BETAMETHASONE ACETATE 6 MG: 3; 3 INJECTION, SUSPENSION INTRA-ARTICULAR; INTRALESIONAL; INTRAMUSCULAR; SOFT TISSUE at 03:07

## 2024-07-29 NOTE — PROGRESS NOTES
"Subjective:      Patient ID: Allyssa Sood is a 64 y.o. female.    Vitals:  height is 5' 2" (1.575 m) and weight is 70.3 kg (155 lb). Her oral temperature is 98.6 °F (37 °C). Her blood pressure is 124/80 and her pulse is 97. Her respiration is 20 and oxygen saturation is 96%.     Chief Complaint: Cough     Patient is a 64 y.o. female who presents to urgent care with complaints of cough, congestion, headache, chills x 3 days. Alleviating factors include none.  Patient states symptoms improved from pneumonia proximally 3 months ago.  Requests refill of Breo    HENT:  Positive for congestion and sinus pressure.    Respiratory:  Positive for cough.       Objective:     Physical Exam   Constitutional: She is oriented to person, place, and time. She appears well-developed. She is cooperative.  Non-toxic appearance. She does not appear ill. No distress.   HENT:   Head: Normocephalic and atraumatic.   Ears:   Right Ear: Hearing, tympanic membrane, external ear and ear canal normal.   Left Ear: Hearing, tympanic membrane, external ear and ear canal normal.   Nose: Nose normal. No mucosal edema, rhinorrhea or nasal deformity. No epistaxis. Right sinus exhibits no maxillary sinus tenderness and no frontal sinus tenderness. Left sinus exhibits no maxillary sinus tenderness and no frontal sinus tenderness.   Mouth/Throat: Uvula is midline, oropharynx is clear and moist and mucous membranes are normal. No trismus in the jaw. Normal dentition. No uvula swelling. No oropharyngeal exudate, posterior oropharyngeal edema or posterior oropharyngeal erythema.   Eyes: Conjunctivae and lids are normal. No scleral icterus.   Neck: Trachea normal and phonation normal. Neck supple. No edema present. No erythema present. No neck rigidity present.   Cardiovascular: Normal rate, regular rhythm, normal heart sounds and normal pulses.   Pulmonary/Chest: Effort normal and breath sounds normal. No respiratory distress. She has no decreased breath " sounds. She has no rhonchi.   Abdominal: Normal appearance.   Musculoskeletal: Normal range of motion.         General: No deformity. Normal range of motion.   Neurological: She is alert and oriented to person, place, and time. She exhibits normal muscle tone. Coordination normal.   Skin: Skin is warm, dry, intact, not diaphoretic and not pale.   Psychiatric: Her speech is normal and behavior is normal. Judgment and thought content normal.   Nursing note and vitals reviewed.      Assessment:     1. Cough, unspecified type    2. Chronic cough      Office Visit on 07/29/2024   Component Date Value Ref Range Status    SARS Coronavirus 2 Antigen 07/29/2024 Negative  Negative Final     Acceptable 07/29/2024 Yes   Final       Plan:   Coricidin OTC as directed for cough  Start Medrol Dosepak tomorrow as instructed  Continue using your Breo inhaler as directed  Increase oral fluids  Ibuprofen or Tylenol as directed for pain or fever  Please follow up with your primary care provider within 2-5 days if your signs and symptoms have not resolved or worsen.    Will notify of the final radiology x-ray report results  Go to ER for worsening cough, shortness for breath, fever, chest pain or for concerns  Cough, unspecified type  -     SARS Coronavirus 2 Antigen, POCT Manual Read  -     betamethasone acetate-betamethasone sodium phosphate injection 6 mg    Chronic cough  -     XR CHEST PA AND LATERAL; Future; Expected date: 07/29/2024  -     methylPREDNISolone (MEDROL DOSEPACK) 4 mg tablet; use as directed  Dispense: 21 each; Refill: 0  -     fluticasone furoate-vilanteroL (BREO) 100-25 mcg/dose diskus inhaler; Inhale 1 puff into the lungs once daily. Controller  Dispense: 14 each; Refill: 1  -     doxycycline (VIBRA-TABS) 100 MG tablet; Take 1 tablet (100 mg total) by mouth 2 (two) times daily.  Dispense: 20 tablet; Refill: 0

## 2024-07-29 NOTE — PATIENT INSTRUCTIONS
Coricidin OTC as directed for cough  Start Medrol Dosepak tomorrow as instructed  Continue using your Breo inhaler as directed  Increase oral fluids  Ibuprofen or Tylenol as directed for pain or fever  Please follow up with your primary care provider within 2-5 days if your signs and symptoms have not resolved or worsen.    Will notify of the final radiology x-ray report results  Go to ER for worsening cough, shortness for breath, fever, chest pain or for concerns

## 2024-08-05 PROBLEM — J18.9 PNA (PNEUMONIA): Status: RESOLVED | Noted: 2024-05-01 | Resolved: 2024-08-05

## 2024-08-09 DIAGNOSIS — K21.9 GASTROESOPHAGEAL REFLUX DISEASE, UNSPECIFIED WHETHER ESOPHAGITIS PRESENT: ICD-10-CM

## 2024-08-09 RX ORDER — FAMOTIDINE 20 MG/1
20 TABLET, FILM COATED ORAL 2 TIMES DAILY
Qty: 60 TABLET | Refills: 2 | Status: SHIPPED | OUTPATIENT
Start: 2024-08-09

## 2024-08-11 DIAGNOSIS — G62.9 NEUROPATHY: ICD-10-CM

## 2024-08-12 ENCOUNTER — TELEPHONE (OUTPATIENT)
Dept: CARDIOLOGY | Facility: CLINIC | Age: 64
End: 2024-08-12
Payer: MEDICARE

## 2024-08-12 DIAGNOSIS — F32.A ANXIETY AND DEPRESSION: ICD-10-CM

## 2024-08-12 DIAGNOSIS — F41.9 ANXIETY AND DEPRESSION: ICD-10-CM

## 2024-08-12 NOTE — TELEPHONE ENCOUNTER
----- Message from Kaylee Mathur sent at 8/9/2024  1:56 PM CDT -----  Patient would like for you to please call back.    Thank you,  Kaylee

## 2024-08-13 RX ORDER — GABAPENTIN 800 MG/1
800 TABLET ORAL 3 TIMES DAILY
Qty: 180 TABLET | Refills: 0 | Status: SHIPPED | OUTPATIENT
Start: 2024-08-13

## 2024-08-13 RX ORDER — PAROXETINE HYDROCHLORIDE 40 MG/1
40 TABLET, FILM COATED ORAL
Qty: 90 TABLET | Refills: 0 | Status: SHIPPED | OUTPATIENT
Start: 2024-08-13

## 2024-10-11 DIAGNOSIS — G62.9 NEUROPATHY: ICD-10-CM

## 2024-10-26 DIAGNOSIS — K21.9 GASTROESOPHAGEAL REFLUX DISEASE, UNSPECIFIED WHETHER ESOPHAGITIS PRESENT: ICD-10-CM

## 2024-10-29 DIAGNOSIS — F41.9 ANXIETY AND DEPRESSION: ICD-10-CM

## 2024-10-29 DIAGNOSIS — E78.5 HYPERLIPIDEMIA, UNSPECIFIED HYPERLIPIDEMIA TYPE: ICD-10-CM

## 2024-10-29 DIAGNOSIS — I10 HYPERTENSION, UNSPECIFIED TYPE: ICD-10-CM

## 2024-10-29 DIAGNOSIS — K21.9 GASTROESOPHAGEAL REFLUX DISEASE, UNSPECIFIED WHETHER ESOPHAGITIS PRESENT: ICD-10-CM

## 2024-10-29 DIAGNOSIS — F32.A ANXIETY AND DEPRESSION: ICD-10-CM

## 2024-10-29 DIAGNOSIS — G62.9 NEUROPATHY: ICD-10-CM

## 2024-10-29 RX ORDER — PANTOPRAZOLE SODIUM 40 MG/1
40 TABLET, DELAYED RELEASE ORAL
Qty: 90 TABLET | Refills: 0 | OUTPATIENT
Start: 2024-10-29

## 2024-10-29 RX ORDER — GABAPENTIN 800 MG/1
800 TABLET ORAL 3 TIMES DAILY
Qty: 180 TABLET | Refills: 0 | OUTPATIENT
Start: 2024-10-29

## 2024-10-30 RX ORDER — EZETIMIBE 10 MG/1
10 TABLET ORAL NIGHTLY
Qty: 90 TABLET | Refills: 1 | Status: SHIPPED | OUTPATIENT
Start: 2024-10-30

## 2024-10-30 RX ORDER — LISINOPRIL 10 MG/1
10 TABLET ORAL DAILY
Qty: 90 TABLET | Refills: 1 | Status: SHIPPED | OUTPATIENT
Start: 2024-10-30

## 2024-10-30 RX ORDER — HYDROXYZINE PAMOATE 25 MG/1
25 CAPSULE ORAL 3 TIMES DAILY
Qty: 90 CAPSULE | Refills: 2 | Status: SHIPPED | OUTPATIENT
Start: 2024-10-30

## 2024-10-30 RX ORDER — PANTOPRAZOLE SODIUM 40 MG/1
40 TABLET, DELAYED RELEASE ORAL DAILY
Qty: 90 TABLET | Refills: 0 | Status: SHIPPED | OUTPATIENT
Start: 2024-10-30

## 2024-10-30 RX ORDER — GABAPENTIN 800 MG/1
800 TABLET ORAL 3 TIMES DAILY
Qty: 180 TABLET | Refills: 0 | Status: SHIPPED | OUTPATIENT
Start: 2024-10-30

## 2024-10-30 RX ORDER — PAROXETINE HYDROCHLORIDE 40 MG/1
40 TABLET, FILM COATED ORAL DAILY
Qty: 90 TABLET | Refills: 0 | Status: SHIPPED | OUTPATIENT
Start: 2024-10-30

## 2024-10-30 RX ORDER — FAMOTIDINE 20 MG/1
20 TABLET, FILM COATED ORAL 2 TIMES DAILY
Qty: 60 TABLET | Refills: 2 | Status: SHIPPED | OUTPATIENT
Start: 2024-10-30

## 2024-11-06 ENCOUNTER — PATIENT OUTREACH (OUTPATIENT)
Facility: CLINIC | Age: 64
End: 2024-11-06
Payer: MEDICARE

## 2024-11-06 NOTE — PROGRESS NOTES
Health Maintenance Topic(s) Outreach Outcomes & Actions Taken:    Colorectal Cancer Screening - Outreach Outcomes & Actions Taken  : portal msg sent     Additional Notes:  [facility]- gap report- HTN med adh. Metoprolol PDC 68%. Note med dosage decreased at last visit.     Appt note added to address flu vaccine at next visit on 11/19/24       Care Management, Digital Medicine, and/or Education Referrals      Next Steps - Referral Actions: Digital Medicine Outcomes and Actions Taken: needs review

## 2024-12-16 ENCOUNTER — OFFICE VISIT (OUTPATIENT)
Dept: FAMILY MEDICINE | Facility: CLINIC | Age: 64
End: 2024-12-16
Payer: MEDICARE

## 2024-12-16 VITALS
BODY MASS INDEX: 28.52 KG/M2 | TEMPERATURE: 99 F | RESPIRATION RATE: 18 BRPM | HEIGHT: 62 IN | HEART RATE: 59 BPM | WEIGHT: 155 LBS | SYSTOLIC BLOOD PRESSURE: 158 MMHG | DIASTOLIC BLOOD PRESSURE: 73 MMHG | OXYGEN SATURATION: 99 %

## 2024-12-16 DIAGNOSIS — G62.9 NEUROPATHY: ICD-10-CM

## 2024-12-16 DIAGNOSIS — D53.9 MACROCYTIC ANEMIA: ICD-10-CM

## 2024-12-16 DIAGNOSIS — K21.9 GASTROESOPHAGEAL REFLUX DISEASE, UNSPECIFIED WHETHER ESOPHAGITIS PRESENT: ICD-10-CM

## 2024-12-16 DIAGNOSIS — Z23 IMMUNIZATION DUE: ICD-10-CM

## 2024-12-16 DIAGNOSIS — F51.04 PSYCHOPHYSIOLOGICAL INSOMNIA: ICD-10-CM

## 2024-12-16 DIAGNOSIS — G89.29 CHRONIC MIDLINE LOW BACK PAIN WITHOUT SCIATICA: ICD-10-CM

## 2024-12-16 DIAGNOSIS — F32.A ANXIETY AND DEPRESSION: ICD-10-CM

## 2024-12-16 DIAGNOSIS — J44.9 CHRONIC OBSTRUCTIVE PULMONARY DISEASE, UNSPECIFIED COPD TYPE: Primary | ICD-10-CM

## 2024-12-16 DIAGNOSIS — I10 HYPERTENSION, UNSPECIFIED TYPE: ICD-10-CM

## 2024-12-16 DIAGNOSIS — M54.50 CHRONIC MIDLINE LOW BACK PAIN WITHOUT SCIATICA: ICD-10-CM

## 2024-12-16 DIAGNOSIS — D64.9 ANEMIA, UNSPECIFIED TYPE: ICD-10-CM

## 2024-12-16 DIAGNOSIS — F41.9 ANXIETY AND DEPRESSION: ICD-10-CM

## 2024-12-16 PROCEDURE — 1160F RVW MEDS BY RX/DR IN RCRD: CPT | Mod: CPTII,,, | Performed by: FAMILY MEDICINE

## 2024-12-16 PROCEDURE — 90653 IIV ADJUVANT VACCINE IM: CPT | Mod: PBBFAC

## 2024-12-16 PROCEDURE — 3077F SYST BP >= 140 MM HG: CPT | Mod: CPTII,,, | Performed by: FAMILY MEDICINE

## 2024-12-16 PROCEDURE — 1159F MED LIST DOCD IN RCRD: CPT | Mod: CPTII,,, | Performed by: FAMILY MEDICINE

## 2024-12-16 PROCEDURE — 3078F DIAST BP <80 MM HG: CPT | Mod: CPTII,,, | Performed by: FAMILY MEDICINE

## 2024-12-16 PROCEDURE — 99214 OFFICE O/P EST MOD 30 MIN: CPT | Mod: S$PBB,,, | Performed by: FAMILY MEDICINE

## 2024-12-16 PROCEDURE — G0008 ADMIN INFLUENZA VIRUS VAC: HCPCS | Mod: PBBFAC

## 2024-12-16 PROCEDURE — 3044F HG A1C LEVEL LT 7.0%: CPT | Mod: CPTII,,, | Performed by: FAMILY MEDICINE

## 2024-12-16 PROCEDURE — 99215 OFFICE O/P EST HI 40 MIN: CPT | Mod: PBBFAC | Performed by: FAMILY MEDICINE

## 2024-12-16 PROCEDURE — 4010F ACE/ARB THERAPY RXD/TAKEN: CPT | Mod: CPTII,,, | Performed by: FAMILY MEDICINE

## 2024-12-16 PROCEDURE — 3288F FALL RISK ASSESSMENT DOCD: CPT | Mod: CPTII,,, | Performed by: FAMILY MEDICINE

## 2024-12-16 PROCEDURE — 3008F BODY MASS INDEX DOCD: CPT | Mod: CPTII,,, | Performed by: FAMILY MEDICINE

## 2024-12-16 PROCEDURE — 1101F PT FALLS ASSESS-DOCD LE1/YR: CPT | Mod: CPTII,,, | Performed by: FAMILY MEDICINE

## 2024-12-16 RX ORDER — FLUTICASONE FUROATE AND VILANTEROL 200; 25 UG/1; UG/1
1 POWDER RESPIRATORY (INHALATION) DAILY
Qty: 60 EACH | Refills: 1 | Status: SHIPPED | OUTPATIENT
Start: 2024-12-16 | End: 2024-12-19

## 2024-12-16 RX ORDER — CYCLOBENZAPRINE HCL 5 MG
5 TABLET ORAL 3 TIMES DAILY PRN
Qty: 90 TABLET | Refills: 1 | Status: SHIPPED | OUTPATIENT
Start: 2024-12-16

## 2024-12-16 RX ORDER — GABAPENTIN 800 MG/1
800 TABLET ORAL 3 TIMES DAILY
Qty: 180 TABLET | Refills: 0 | Status: SHIPPED | OUTPATIENT
Start: 2024-12-16

## 2024-12-16 RX ORDER — HYDROXYZINE PAMOATE 50 MG/1
50 CAPSULE ORAL EVERY 8 HOURS PRN
Qty: 90 CAPSULE | Refills: 1 | Status: SHIPPED | OUTPATIENT
Start: 2024-12-16

## 2024-12-16 RX ORDER — FAMOTIDINE 20 MG/1
20 TABLET, FILM COATED ORAL 2 TIMES DAILY
Qty: 60 TABLET | Refills: 2 | Status: SHIPPED | OUTPATIENT
Start: 2024-12-16

## 2024-12-16 RX ORDER — UMECLIDINIUM 62.5 UG/1
62.5 AEROSOL, POWDER ORAL DAILY
Qty: 30 EACH | Refills: 0 | Status: SHIPPED | OUTPATIENT
Start: 2024-12-16

## 2024-12-16 RX ADMIN — INFLUENZA A VIRUS A/VICTORIA/4897/2022 IVR-238 (H1N1) ANTIGEN (FORMALDEHYDE INACTIVATED), INFLUENZA A VIRUS A/THAILAND/8/2022 IVR-237 (H3N2) ANTIGEN (FORMALDEHYDE INACTIVATED), INFLUENZA B VIRUS B/AUSTRIA/1359417/2021 BVR-26 ANTIGEN (FORMALDEHYDE INACTIVATED) 0.5 ML: 15; 15; 15 INJECTION, SUSPENSION INTRAMUSCULAR at 11:12
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
## 2024-12-19 DIAGNOSIS — J44.9 CHRONIC OBSTRUCTIVE PULMONARY DISEASE, UNSPECIFIED COPD TYPE: Primary | ICD-10-CM

## 2024-12-19 RX ORDER — FLUTICASONE PROPIONATE AND SALMETEROL 100; 50 UG/1; UG/1
1 POWDER RESPIRATORY (INHALATION) 2 TIMES DAILY
Qty: 180 EACH | Refills: 0 | Status: SHIPPED | OUTPATIENT
Start: 2024-12-19 | End: 2025-03-19

## 2024-12-29 DIAGNOSIS — G62.9 NEUROPATHY: ICD-10-CM

## 2024-12-30 RX ORDER — GABAPENTIN 800 MG/1
800 TABLET ORAL 3 TIMES DAILY
Qty: 180 TABLET | Refills: 1 | Status: SHIPPED | OUTPATIENT
Start: 2024-12-30

## 2025-01-07 ENCOUNTER — PATIENT OUTREACH (OUTPATIENT)
Facility: CLINIC | Age: 65
End: 2025-01-07
Payer: MEDICARE

## 2025-01-07 NOTE — PROGRESS NOTES
Health Maintenance Topic(s) Outreach Outcomes & Actions Taken:    Colorectal Cancer Screening - Outreach Outcomes & Actions Taken  : LVM. Portal msg sent.    Blood Pressure - Outreach Outcomes & Actions Taken  : Primary Care Follow Up Visit Scheduled  and 3/19/2025     Additional Notes:  Attempt made to contact patient. No answer. Message left with name and phone number for callback.     Annual Wellness Visit: Due     Next PCP F/U: 3/19/2025  SDOH Incomplete   Health Maintenance Topics Overdue:  VBHM Score: 2   Colon Cancer Screening- GI referral to [facility] denied. Ins out of network. LVM to pt of would like ext referral or cologuard.   Uncontrolled BP-       Care Management, Digital Medicine, and/or Education Referrals      Next Steps - Referral Actions: Digital Medicine Outcomes and Actions Taken: Pt Declined or Not Eligible

## 2025-01-28 DIAGNOSIS — F32.A ANXIETY AND DEPRESSION: ICD-10-CM

## 2025-01-28 DIAGNOSIS — F41.9 ANXIETY AND DEPRESSION: ICD-10-CM

## 2025-01-28 RX ORDER — PAROXETINE HYDROCHLORIDE 40 MG/1
40 TABLET, FILM COATED ORAL DAILY
Qty: 90 TABLET | Refills: 2 | Status: SHIPPED | OUTPATIENT
Start: 2025-01-28 | End: 2026-01-28

## 2025-04-07 ENCOUNTER — PATIENT OUTREACH (OUTPATIENT)
Facility: CLINIC | Age: 65
End: 2025-04-07
Payer: MEDICARE

## 2025-04-11 DIAGNOSIS — K21.9 GASTROESOPHAGEAL REFLUX DISEASE, UNSPECIFIED WHETHER ESOPHAGITIS PRESENT: ICD-10-CM

## 2025-04-11 DIAGNOSIS — F32.A ANXIETY AND DEPRESSION: ICD-10-CM

## 2025-04-11 DIAGNOSIS — I10 HYPERTENSION, UNSPECIFIED TYPE: ICD-10-CM

## 2025-04-11 DIAGNOSIS — F41.9 ANXIETY AND DEPRESSION: ICD-10-CM

## 2025-04-11 DIAGNOSIS — F51.04 PSYCHOPHYSIOLOGICAL INSOMNIA: ICD-10-CM

## 2025-04-14 RX ORDER — HYDROXYZINE PAMOATE 50 MG/1
50 CAPSULE ORAL EVERY 8 HOURS PRN
Qty: 60 CAPSULE | Refills: 2 | Status: SHIPPED | OUTPATIENT
Start: 2025-04-14 | End: 2026-04-14

## 2025-04-14 RX ORDER — PANTOPRAZOLE SODIUM 40 MG/1
40 TABLET, DELAYED RELEASE ORAL DAILY
Qty: 90 TABLET | Refills: 2 | Status: SHIPPED | OUTPATIENT
Start: 2025-04-14 | End: 2026-04-14

## 2025-04-14 RX ORDER — LISINOPRIL 10 MG/1
10 TABLET ORAL DAILY
Qty: 90 TABLET | Refills: 2 | Status: SHIPPED | OUTPATIENT
Start: 2025-04-14 | End: 2026-04-14

## 2025-04-24 DIAGNOSIS — G62.9 NEUROPATHY: ICD-10-CM

## 2025-04-24 RX ORDER — GABAPENTIN 800 MG/1
800 TABLET ORAL 3 TIMES DAILY
Qty: 180 TABLET | Refills: 1 | Status: SHIPPED | OUTPATIENT
Start: 2025-04-24

## 2025-05-08 ENCOUNTER — TELEPHONE (OUTPATIENT)
Dept: INTERNAL MEDICINE | Facility: CLINIC | Age: 65
End: 2025-05-08
Payer: MEDICARE

## 2025-08-12 DIAGNOSIS — K21.9 GASTROESOPHAGEAL REFLUX DISEASE, UNSPECIFIED WHETHER ESOPHAGITIS PRESENT: ICD-10-CM

## 2025-08-12 DIAGNOSIS — J44.9 CHRONIC OBSTRUCTIVE PULMONARY DISEASE, UNSPECIFIED COPD TYPE: ICD-10-CM

## 2025-08-19 DIAGNOSIS — G62.9 NEUROPATHY: ICD-10-CM

## 2025-08-19 RX ORDER — GABAPENTIN 800 MG/1
800 TABLET ORAL 3 TIMES DAILY
Qty: 270 TABLET | Refills: 0 | Status: SHIPPED | OUTPATIENT
Start: 2025-08-19 | End: 2025-11-17